# Patient Record
Sex: MALE
[De-identification: names, ages, dates, MRNs, and addresses within clinical notes are randomized per-mention and may not be internally consistent; named-entity substitution may affect disease eponyms.]

---

## 2018-03-16 ENCOUNTER — HOSPITAL ENCOUNTER (INPATIENT)
Dept: HOSPITAL 14 - H.ER | Age: 68
LOS: 5 days | Discharge: NURSING, REHAB OR LONG-TERM CARE | DRG: 871 | End: 2018-03-21
Attending: FAMILY MEDICINE | Admitting: FAMILY MEDICINE
Payer: MEDICARE

## 2018-03-16 VITALS — BODY MASS INDEX: 22.7 KG/M2

## 2018-03-16 DIAGNOSIS — E86.0: ICD-10-CM

## 2018-03-16 DIAGNOSIS — Z90.410: ICD-10-CM

## 2018-03-16 DIAGNOSIS — E83.39: ICD-10-CM

## 2018-03-16 DIAGNOSIS — L97.529: ICD-10-CM

## 2018-03-16 DIAGNOSIS — T68.XXXA: ICD-10-CM

## 2018-03-16 DIAGNOSIS — Z79.4: ICD-10-CM

## 2018-03-16 DIAGNOSIS — E89.1: ICD-10-CM

## 2018-03-16 DIAGNOSIS — M60.9: ICD-10-CM

## 2018-03-16 DIAGNOSIS — E87.6: ICD-10-CM

## 2018-03-16 DIAGNOSIS — L03.116: ICD-10-CM

## 2018-03-16 DIAGNOSIS — N17.9: ICD-10-CM

## 2018-03-16 DIAGNOSIS — Z23: ICD-10-CM

## 2018-03-16 DIAGNOSIS — E13.43: ICD-10-CM

## 2018-03-16 DIAGNOSIS — E87.0: ICD-10-CM

## 2018-03-16 DIAGNOSIS — E13.00: ICD-10-CM

## 2018-03-16 DIAGNOSIS — A41.9: Primary | ICD-10-CM

## 2018-03-16 DIAGNOSIS — R41.0: ICD-10-CM

## 2018-03-16 DIAGNOSIS — R31.9: ICD-10-CM

## 2018-03-16 DIAGNOSIS — E13.10: ICD-10-CM

## 2018-03-16 DIAGNOSIS — R65.20: ICD-10-CM

## 2018-03-16 DIAGNOSIS — E78.5: ICD-10-CM

## 2018-03-16 DIAGNOSIS — I10: ICD-10-CM

## 2018-03-16 DIAGNOSIS — E87.5: ICD-10-CM

## 2018-03-16 LAB
ALBUMIN SERPL-MCNC: 3.9 G/DL (ref 3.5–5)
ALBUMIN/GLOB SERPL: 0.8 {RATIO} (ref 1–2.1)
ALT SERPL-CCNC: 6 U/L (ref 21–72)
ANISOCYTOSIS BLD QL SMEAR: SLIGHT
APTT BLD: 32.5 SECONDS (ref 25.6–37.1)
AST SERPL-CCNC: 22 U/L (ref 17–59)
BACTERIA #/AREA URNS HPF: (no result) /[HPF]
BASE EXCESS BLDV CALC-SCNC: -20.3 MMOL/L (ref 0–2)
BASE EXCESS BLDV CALC-SCNC: -30.5 MMOL/L (ref 0–2)
BASOPHILS # BLD AUTO: 0.3 K/UL (ref 0–0.2)
BASOPHILS NFR BLD: 1 % (ref 0–2)
BILIRUB UR-MCNC: NEGATIVE MG/DL
BUN SERPL-MCNC: 51 MG/DL (ref 9–20)
BUN SERPL-MCNC: 54 MG/DL (ref 9–20)
BURR CELLS BLD QL SMEAR: SLIGHT
CALCIUM SERPL-MCNC: 10.7 MG/DL (ref 8.4–10.2)
CALCIUM SERPL-MCNC: 9.1 MG/DL (ref 8.4–10.2)
COLOR UR: YELLOW
EOSINOPHIL # BLD AUTO: 0 K/UL (ref 0–0.7)
EOSINOPHIL NFR BLD: 0.1 % (ref 0–4)
ERYTHROCYTE [DISTWIDTH] IN BLOOD BY AUTOMATED COUNT: 18.3 % (ref 11.5–14.5)
GFR NON-AFRICAN AMERICAN: 30
GFR NON-AFRICAN AMERICAN: 47
GIANT PLATELETS BLD QL SMEAR: PRESENT
GLUCOSE UR STRIP-MCNC: >=500 MG/DL
HGB BLD-MCNC: 12.7 G/DL (ref 12–18)
INR PPP: 1.2 (ref 0.9–1.2)
LEUKOCYTE ESTERASE UR-ACNC: (no result) LEU/UL
LG PLATELETS BLD QL SMEAR: PRESENT
LYMPHOCYTE: 7 % (ref 20–50)
LYMPHOCYTES # BLD AUTO: 1.3 K/UL (ref 1–4.3)
LYMPHOCYTES NFR BLD AUTO: 5.1 % (ref 20–40)
MCH RBC QN AUTO: 26.7 PG (ref 27–31)
MCHC RBC AUTO-ENTMCNC: 29.9 G/DL (ref 33–37)
MCV RBC AUTO: 89.1 FL (ref 80–94)
MONOCYTE: 6 % (ref 0–10)
MONOCYTES # BLD: 1.6 K/UL (ref 0–0.8)
MONOCYTES NFR BLD: 6.3 % (ref 0–10)
MYELOCYTES NFR BLD: 2 % (ref 0–0)
NEUTROPHILS # BLD: 22.4 K/UL (ref 1.8–7)
NEUTROPHILS NFR BLD AUTO: 80 % (ref 42–75)
NEUTROPHILS NFR BLD AUTO: 87.5 % (ref 50–75)
NEUTS BAND NFR BLD: 5 % (ref 0–2)
NRBC BLD AUTO-RTO: 0 % (ref 0–0)
OSMOLALITY SERPL: 365 MOSM/KG (ref 272–300)
PCO2 BLDV: 20 MMHG (ref 40–60)
PCO2 BLDV: 20 MMHG (ref 40–60)
PH BLDV: 6.81 [PH] (ref 7.32–7.43)
PH BLDV: 7.14 [PH] (ref 7.32–7.43)
PH UR STRIP: 5 [PH] (ref 5–8)
PLATELET # BLD EST: NORMAL 10*3/UL
PLATELET # BLD: 481 K/UL (ref 130–400)
PMV BLD AUTO: 12 FL (ref 7.2–11.7)
POIKILOCYTOSIS BLD QL SMEAR: SLIGHT
PROT UR STRIP-MCNC: 100 MG/DL
PROTHROMBIN TIME: 13.6 SECONDS (ref 9.8–13.1)
RBC # BLD AUTO: 4.78 MIL/UL (ref 4.4–5.9)
RBC # UR STRIP: (no result) /UL
SP GR UR STRIP: 1.02 (ref 1–1.03)
SQUAMOUS EPITHIAL: < 1 /HPF (ref 0–5)
T4 FREE SERPL-MCNC: 1.24 NG/DL (ref 0.78–2.19)
TOTAL CELLS COUNTED BLD: 100
URINE CLARITY: (no result)
URINE HYALINE CAST: (no result) /HPF (ref 0–2)
UROBILINOGEN UR-MCNC: (no result) MG/DL (ref 0.2–1)
VENOUS BLOOD FIO2: 21 %
VENOUS BLOOD FIO2: 21 %
VENOUS BLOOD GAS PO2: 43 MM/HG (ref 30–55)
VENOUS BLOOD GAS PO2: 44 MM/HG (ref 30–55)
WBC # BLD AUTO: 25.6 K/UL (ref 4.8–10.8)

## 2018-03-16 RX ADMIN — ENOXAPARIN SODIUM SCH MG: 30 INJECTION SUBCUTANEOUS at 20:16

## 2018-03-16 NOTE — CP.PCM.CON
History of Present Illness





- History of Present Illness


History of Present Illness: 





66 y/o M with PMhx of IDDM, s/p pancreatic resection many years ago is brought 

to ED with AMS after being found by brother at home laying in the floor naked. 

Brother states he was concerned that patient didnt return his calls since 

Yesterday. Last time a person spoke with him was around 10 am Yesterday. He 

states that patient has not been complaining of any medical issues recently 

other than a L/foot ulcer for what he had been applying some creams at home. As 

per brother patient always states he is compliant with his home insulin but 

they are not sure since patient lives alone. He is retired and dosnt work. 

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 At ED patient was found to 

have AMS, acidotic, BS extremely elevated, lactate and PH abnormal and patient 

was very hypotermic.





admitted for  DKA  sepsis  infected left foot 


ID consulted for antibiotic management 








Review of Systems





- Review of Systems


Systems not reviewed;Unavailable: Altered Mental Status


All systems: reviewed and no additional remarkable complaints except





- Constitutional


Constitutional: As Per HPI, Anorexia, Chills





- EENT


Eyes: absent: As Per HPI, Blind Spots, Blurred Vision, Change in Vision, 

Decreased Night Vision, Diplopia, Discharge, Dry Eye, Exophthalmos, Floaters, 

Irritation, Itchy Eyes, Loss of Peripheral Vision, Pain, Photophobia, Requires 

Corrective Lenses, Sees Flashes, Spots in Vision, Tunnel Vision, Other Visual 

Disturbances, Loss of Vision, Other


Ears: absent: As Per HPI, Decreased Hearing, Ear Discharge, Ear Pain, Tinnitus, 

Abnormal Hearing, Disequilibrium, Dizziness, Other


Nose/Mouth/Throat: absent: As Per HPI, Epistaxis, Nasal Congestion, Nasal 

Discharge, Nasal Obstruction, Nasal Trauma, Nose Pain, Post Nasal Drip, Sinus 

Pain, Sinus Pressure, Bleeding Gums, Change in Voice, Dental Pain, Dry Mouth, 

Dysphagia, Halitosis, Hoarsness, Lip Swelling, Mouth Lesions, Mouth Pain, 

Odynophagia, Sore Throat, Throat Swelling, Tongue Swelling, Facial Pain, Neck 

Pain, Neck Mass, Other





- Cardiovascular


Cardiovascular: As Per HPI





- Respiratory


Respiratory: absent: As Per HPI, Cough, Dyspnea, Hemoptysis, Dyspnea on Exertion

, Wheezing, Snoring, Stridor, Pain on Inspiration, Chest Congestion, Excessive 

Mucous Production, Change in Mucous Color, Pain with Coughing, Other





- Gastrointestinal


Gastrointestinal: absent: As Per HPI, Abdominal Pain, Belching, Bloating, 

Change in Bowel Habits, Change in Stool Character, Coffee Ground Emesis, 

Constipation, Cramping, Diarrhea, Dyspepsia, Dysphagia, Early Satiety, 

Excessive Flatus, Fecal Incontinence, Heartburn, Hematemesis, Hematochezia, 

Loose Stools, Melena, Nausea, Odynophagia, Temesmus, Vomiting, Other





- Genitourinary


Genitourinary: absent: As Per HPI, Change in Urinary Stream, Difficulty 

Urinating, Dysuria, Flank Pain, Hematuria, Pyuria, Nocturia, Urinary 

Incontinence, Urinary Frequency, Urinary Hesitance, Urinary Urgency, Voiding 

Freq/Small Amts, Freq UTI, Hx Renal/Bladder Calculi, Hx /Renal Surgery, 

Bladder Distension, Other





- Musculoskeletal


Musculoskeletal: As Per HPI





- Integumentary


Integumentary: As Per HPI, Skin Pain, Wounds





- Neurological


Neurological: As Per HPI





- Psychiatric


Psychiatric: absent: As Per HPI, Abnormal Sleep Pattern, Anhedonia, Anxiety, 

Auditory Hallucinations, Behavioral Changes, Change in Appetite, Change in 

Libido, Confusion, Depression, Difficulty Concentrating, Hallucinations, 

Homicidal Ideation, Hopelessness, Irritability, Memory Loss, Mood Swings, Panic 

Attacks, Paranoia, Suicidal Ideation, Visual Hallucinations, Tactile 

Hallucinations, Other





- Endocrine


Endocrine: As Per HPI





- Hematologic/Lymphatic


Hematologic: absent: As Per HPI, Easy Bleeding, Easy Bruising, Lymphadenopathy, 

Other





Past Patient History





- Past Social History


Smoking Status: Unknown If Ever Smoked


Alcohol: None (Ex ETOH abuser)





- NEUROLOGICAL


Hx Neurological Disorder: Yes





- RENAL


Hx Chronic Kidney Disease: No





- ENDOCRINE/METABOLIC


Hx Diabetes Mellitus Type 2: Yes





- GASTROINTESTINAL


Hx Pancreatitis: Yes





- PSYCHIATRIC


Hx Psychophysiologic Disorder: Yes


Hx Substance Use: No





- SURGICAL HISTORY


Hx Surgeries: Yes


Other/Comment: Pancreatic Sx





- ANESTHESIA


Hx Anesthesia: Yes


Hx Anesthesia Reactions: No





Meds


Allergies/Adverse Reactions: 


 Allergies











Allergy/AdvReac Type Severity Reaction Status Date / Time


 


No Known Allergies Allergy   Verified 03/16/18 09:59














- Medications


Medications: 


 Current Medications





Dextrose (Dextrose 50% Inj)  0 ml IV STAT PRN; Protocol


   PRN Reason: Hypoglycemia Protocol


Dextrose (Glutose 15)  0 gm PO ONCE PRN; Protocol


   PRN Reason: Hypoglycemia Protocol


Enoxaparin Sodium (Lovenox)  30 mg SC DAILY SUZY


   PRN Reason: Protocol


Glucagon (Glucagen Diagnostic Kit)  0 mg IM STAT PRN; Protocol


   PRN Reason: Hypoglycemia Protocol


Sodium Chloride (Sodium Chloride 0.9%)  1,000 mls @ 999 mls/hr IV .Q1H1M SUZY


   Stop: 03/17/18 10:04


   Last Admin: 03/16/18 12:29 Dose:  999 mls/hr


Insulin Human Regular 100 (units/ Sodium Chloride)  101 mls @ 7.07 mls/hr IV 

.[id number] SUZY; 7 UNITS/HR


   PRN Reason: Protocol


   Last Admin: 03/16/18 11:20 Dose:  7.07 mls/hr


Piperacillin Sod/Tazobactam (Sod 3.375 gm/ Sodium Chloride)  100 mls @ 100 mls/

hr IVPB Q6 SUZY


   PRN Reason: Protocol


Pantoprazole Sodium (Protonix Ec Tab)  40 mg PO DAILY [facility]











Physical Exam





- Constitutional


Appears: Toxic, Confused, Cachectic, Chronically Ill





- Head Exam


Head Exam: NORMOCEPHALIC





- Eye Exam


Eye Exam: absent: Scleral icterus





- ENT Exam


ENT Exam: Mucous Membranes Dry, Normal External Ear Exam





- Neck Exam


Neck exam: Negative for: Lymphadenopathy





- Respiratory Exam


Respiratory Exam: Decreased Breath Sounds, Rhonchi





- Cardiovascular Exam


Cardiovascular Exam: Tachycardia, REGULAR RHYTHM, +S1, +S2





- GI/Abdominal Exam


GI & Abdominal Exam: Diminished Bowel Sounds, Soft.  absent: Tenderness





- Rectal Exam


Rectal Exam: Deferred





-  Exam


 Exam: NORMAL INSPECTION





- Extremities Exam


Extremities exam: Positive for: pedal edema, tenderness, pedal pulses present.  

Negative for: calf tenderness


Additional comments: 





necrotic cellulitis lesion dorsum left foot 





- Back Exam


Back exam: absent: CVA tenderness (L), CVA tenderness (R)





- Neurological Exam


Neurological exam: Alert, CN II-XII Intact, Oriented x3, Reflexes Normal





- Psychiatric Exam


Psychiatric exam: Depressed





- Skin


Skin Exam: Dry





Results





- Vital Signs


Recent Vital Signs: 


 Last Vital Signs











Temp  92.4 F L  03/16/18 13:16


 


Pulse  91 H  03/16/18 13:41


 


Resp  20   03/16/18 13:41


 


BP  106/66   03/16/18 13:41


 


Pulse Ox  100   03/16/18 13:41














- Labs


Result Diagrams: 


 03/16/18 10:26





 03/16/18 10:26


Labs: 


 Laboratory Results - last 24 hr











  03/16/18 03/16/18 03/16/18





  10:20 10:26 10:26


 


WBC    25.6 H


 


RBC    4.78


 


Hgb    12.7


 


Hct    42.6


 


MCV    89.1


 


MCH    26.7 L


 


MCHC    29.9 L


 


RDW    18.3 H


 


Plt Count    481 H


 


MPV    12.0 H


 


Neut % (Auto)    87.5 H


 


Lymph % (Auto)    5.1 L


 


Mono % (Auto)    6.3


 


Eos % (Auto)    0.1


 


Baso % (Auto)    1.0


 


Neut # (Auto)    22.4 H


 


Lymph # (Auto)    1.3


 


Mono # (Auto)    1.6 H


 


Eos # (Auto)    0.0


 


Baso # (Auto)    0.3 H


 


Neutrophils % (Manual)    80 H


 


Band Neutrophils %    5 H


 


Lymphocytes % (Manual)    7 L


 


Monocytes % (Manual)    6


 


Myelocytes %    2 H


 


Platelet Estimate    Normal


 


Large Platelets    Present


 


Giant Platelets    Present


 


Poikilocytosis (manual    Slight


 


Anisocytosis (manual)    Slight


 


[place] Cells    Slight


 


PT   


 


INR   


 


APTT   


 


pO2  43  


 


VBG pH  6.81 L*  


 


VBG pCO2  20 L  


 


VBG HCO3  -0.6  


 


VBG Total CO2  3.8 L  


 


VBG O2 Sat (Calc)  74.6 H  


 


VBG Base Excess  -30.5 L  


 


VBG Potassium  6.3 H*  


 


Sodium  141.0  147 


 


Chloride  97.0 L  102 


 


Glucose  > 750 H*  


 


Lactate  4.6 H*  


 


FiO2  21.0  


 


Crit Value Called To  [facility]  


 


Crit Value Called By  23  


 


Crit Value Read Back  Y  


 


Blood Gas Notified Time  1040  


 


Potassium   5.7 H 


 


Carbon Dioxide   < 5 L* 


 


Anion Gap   46 H 


 


BUN   54 H 


 


Creatinine   2.2 H 


 


Est GFR ( Amer)   37 


 


Est GFR (Non-Af Amer)   30 


 


Random Glucose   961 H* 


 


Calcium   10.7 H 


 


Magnesium   2.9 H 


 


Total Bilirubin   0.4 


 


AST   22 


 


ALT   6 L 


 


Alkaline Phosphatase   137 H 


 


Total Protein   8.8 H 


 


Albumin   3.9 


 


Globulin   4.9 H 


 


Albumin/Globulin Ratio   0.8 L 


 


Venous Blood Potassium  6.3 H*  


 


Urine Color   


 


Urine Clarity   


 


Urine pH   


 


Ur Specific Gravity   


 


Urine Protein   


 


Urine Glucose (UA)   


 


Urine Ketones   


 


Urine Blood   


 


Urine Nitrate   


 


Urine Bilirubin   


 


Urine Urobilinogen   


 


Ur Leukocyte Esterase   


 


Urine RBC (Auto)   


 


Urine Microscopic WBC   


 


Ur Squamous Epith Cells   


 


Urine Bacteria   


 


Hyaline Casts   














  03/16/18 03/16/18





  10:26 12:11


 


WBC  


 


RBC  


 


Hgb  


 


Hct  


 


MCV  


 


MCH  


 


MCHC  


 


RDW  


 


Plt Count  


 


MPV  


 


Neut % (Auto)  


 


Lymph % (Auto)  


 


Mono % (Auto)  


 


Eos % (Auto)  


 


Baso % (Auto)  


 


Neut # (Auto)  


 


Lymph # (Auto)  


 


Mono # (Auto)  


 


Eos # (Auto)  


 


Baso # (Auto)  


 


Neutrophils % (Manual)  


 


Band Neutrophils %  


 


Lymphocytes % (Manual)  


 


Monocytes % (Manual)  


 


Myelocytes %  


 


Platelet Estimate  


 


Large Platelets  


 


Giant Platelets  


 


Poikilocytosis (manual  


 


Anisocytosis (manual)  


 


[place] Cells  


 


PT  13.6 H 


 


INR  1.2 


 


APTT  32.5 


 


pO2  


 


VBG pH  


 


VBG pCO2  


 


VBG HCO3  


 


VBG Total CO2  


 


VBG O2 Sat (Calc)  


 


VBG Base Excess  


 


VBG Potassium  


 


Sodium  


 


Chloride  


 


Glucose  


 


Lactate  


 


FiO2  


 


Crit Value Called To  


 


Crit Value Called By  


 


Crit Value Read Back  


 


Blood Gas Notified Time  


 


Potassium  


 


Carbon Dioxide  


 


Anion Gap  


 


BUN  


 


Creatinine  


 


Est GFR ( Amer)  


 


Est GFR (Non-Af Amer)  


 


Random Glucose  


 


Calcium  


 


Magnesium  


 


Total Bilirubin  


 


AST  


 


ALT  


 


Alkaline Phosphatase  


 


Total Protein  


 


Albumin  


 


Globulin  


 


Albumin/Globulin Ratio  


 


Venous Blood Potassium  


 


Urine Color   Yellow


 


Urine Clarity   Slighty-cloudy


 


Urine pH   5.0


 


Ur Specific Gravity   1.016


 


Urine Protein   100


 


Urine Glucose (UA)   >=500


 


Urine Ketones   80


 


Urine Blood   Moderate


 


Urine Nitrate   Negative


 


Urine Bilirubin   Negative


 


Urine Urobilinogen   0.2-1.0


 


Ur Leukocyte Esterase   Neg


 


Urine RBC (Auto)   6 H


 


Urine Microscopic WBC   1


 


Ur Squamous Epith Cells   < 1


 


Urine Bacteria   Rare


 


Hyaline Casts   6-10 H














Assessment & Plan





- Assessment and Plan (Free Text)


Assessment: 





sepsis


hypothermia


dka


dm


cellulitis / necrosis left foot 








cont iv rx


vascular and podiatry mae


iv fluids/ insulin


iv antibiotics

## 2018-03-16 NOTE — RAD
HISTORY:

AMS  



COMPARISON:

Not available 



FINDINGS:



LUNGS:

No active pulmonary disease.



PLEURA:

No significant pleural effusion identified, no pneumothorax apparent.



CARDIOVASCULAR:

Normal.



OSSEOUS STRUCTURES:

No significant abnormalities.



VISUALIZED UPPER ABDOMEN:

Normal.



OTHER FINDINGS:

None.



IMPRESSION:

No active disease.

## 2018-03-16 NOTE — CP.PCM.HP
<PhuAndre - Last Filed: 03/16/18 13:51>





History of Present Illness





- History of Present Illness


History of Present Illness: 





Hx taken from family member since patient has AMS





66 y/o M with PMhx of IDDM, s/p pancreatic resection many years ago is brought 

to ED with AMS after being found by brother at home laying in the floor naked. 

Brother states he was concerned that patient didnt return his calls since 

Yesterday. Last time a person spoke with him was around 10 am Yesterday. He 

states that patient has not been complaining of any medical issues recently 

other than a L/foot ulcer for what he had been applying some creams at home. As 

per brother patient always states he is compliant with his home insulin but 

they are not sure since patient lives alone. He is retired and dosnt work. 

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 At ED patient was found to 

have AMS, acidotic, BS extremely elevated, lactate and PH abnormal and patient 

was very hypotermic.





Present on Admission





- Present on Admission


Any Indicators Present on Admission: Yes





Review of Systems





- Review of Systems


Review of Systems: 





Unable to obtain due to patients AMS





Past Patient History





- Past Social History


Smoking Status: Unknown If Ever Smoked


Alcohol: None (Ex ETOH abuser)





- NEUROLOGICAL


Hx Neurological Disorder: Yes





- RENAL


Hx Chronic Kidney Disease: No





- ENDOCRINE/METABOLIC


Hx Diabetes Mellitus Type 2: Yes





- PSYCHIATRIC


Hx Psychophysiologic Disorder: Yes


Hx Substance Use: No





- SURGICAL HISTORY


Hx Surgeries: Yes


Other/Comment: Pancreatic Sx





Meds


Allergies/Adverse Reactions: 


 Allergies











Allergy/AdvReac Type Severity Reaction Status Date / Time


 


No Known Allergies Allergy   Verified 03/16/18 09:59














Physical Exam





- Constitutional


Appears: In Acute Distress (AMS)





- Eye Exam


Eye Exam: PERRL





- ENT Exam


ENT Exam: Mucous Membranes Dry





- Respiratory Exam


Respiratory Exam: absent: Rales, Wheezes, Respiratory Distress





- Cardiovascular Exam


Cardiovascular Exam: REGULAR RHYTHM, +S1, +S2.  absent: Gallop





- GI/Abdominal Exam


GI & Abdominal Exam: Soft.  absent: Distended, Guarding, Rigid


Additional comments: 





Sx scars





- Extremities Exam


Extremities exam: Negative for: normal inspection (L/ankle ulcer), pedal edema





- Neurological Exam


Neurological exam: Alert, Altered (Diffucult to arouse, response to verbal 

command is poor)





- Skin


Additional comments: 


Multiple small abrasion/scratching lesions in both LE





Results





- Vital Signs


Recent Vital Signs: 





 Last Vital Signs











Temp  91.3 F L  03/16/18 11:19


 


Pulse  90   03/16/18 10:40


 


Resp  24   03/16/18 10:40


 


BP  120/88   03/16/18 10:40


 


Pulse Ox  100   03/16/18 10:40














- Labs


Result Diagrams: 


 03/16/18 10:26





 03/16/18 10:26


Labs: 





 Laboratory Results - last 24 hr











  03/16/18 03/16/18 03/16/18





  10:20 10:26 10:26


 


WBC    25.6 H


 


RBC    4.78


 


Hgb    12.7


 


Hct    42.6


 


MCV    89.1


 


MCH    26.7 L


 


MCHC    29.9 L


 


RDW    18.3 H


 


Plt Count    481 H


 


MPV    12.0 H


 


Neut % (Auto)    87.5 H


 


Lymph % (Auto)    5.1 L


 


Mono % (Auto)    6.3


 


Eos % (Auto)    0.1


 


Baso % (Auto)    1.0


 


Neut # (Auto)    22.4 H


 


Lymph # (Auto)    1.3


 


Mono # (Auto)    1.6 H


 


Eos # (Auto)    0.0


 


Baso # (Auto)    0.3 H


 


Neutrophils % (Manual)    80 H


 


Band Neutrophils %    5 H


 


Lymphocytes % (Manual)    7 L


 


Monocytes % (Manual)    6


 


Myelocytes %    2 H


 


Platelet Estimate    Normal


 


Large Platelets    Present


 


Giant Platelets    Present


 


Poikilocytosis (manual    Slight


 


Anisocytosis (manual)    Slight


 


[place] Cells    Slight


 


PT   


 


INR   


 


APTT   


 


pO2  43  


 


VBG pH  6.81 L*  


 


VBG pCO2  20 L  


 


VBG HCO3  -0.6  


 


VBG Total CO2  3.8 L  


 


VBG O2 Sat (Calc)  74.6 H  


 


VBG Base Excess  -30.5 L  


 


VBG Potassium  6.3 H*  


 


Sodium  141.0  147 


 


Chloride  97.0 L  102 


 


Glucose  > 750 H*  


 


Lactate  4.6 H*  


 


FiO2  21.0  


 


Crit Value Called To  [facility]  


 


Crit Value Called By  23  


 


Crit Value Read Back  Y  


 


Blood Gas Notified Time  1040  


 


Potassium   5.7 H 


 


Carbon Dioxide   < 5 L* 


 


Anion Gap   46 H 


 


BUN   54 H 


 


Creatinine   2.2 H 


 


Est GFR ( Amer)   37 


 


Est GFR (Non-Af Amer)   30 


 


Random Glucose   961 H* 


 


Calcium   10.7 H 


 


Magnesium   2.9 H 


 


Total Bilirubin   0.4 


 


AST   22 


 


ALT   6 L 


 


Alkaline Phosphatase   137 H 


 


Total Protein   8.8 H 


 


Albumin   3.9 


 


Globulin   4.9 H 


 


Albumin/Globulin Ratio   0.8 L 


 


Venous Blood Potassium  6.3 H*  














  03/16/18





  10:26


 


WBC 


 


RBC 


 


Hgb 


 


Hct 


 


MCV 


 


MCH 


 


MCHC 


 


RDW 


 


Plt Count 


 


MPV 


 


Neut % (Auto) 


 


Lymph % (Auto) 


 


Mono % (Auto) 


 


Eos % (Auto) 


 


Baso % (Auto) 


 


Neut # (Auto) 


 


Lymph # (Auto) 


 


Mono # (Auto) 


 


Eos # (Auto) 


 


Baso # (Auto) 


 


Neutrophils % (Manual) 


 


Band Neutrophils % 


 


Lymphocytes % (Manual) 


 


Monocytes % (Manual) 


 


Myelocytes % 


 


Platelet Estimate 


 


Large Platelets 


 


Giant Platelets 


 


Poikilocytosis (manual 


 


Anisocytosis (manual) 


 


[place] Cells 


 


PT  13.6 H


 


INR  1.2


 


APTT  32.5


 


pO2 


 


VBG pH 


 


VBG pCO2 


 


VBG HCO3 


 


VBG Total CO2 


 


VBG O2 Sat (Calc) 


 


VBG Base Excess 


 


VBG Potassium 


 


Sodium 


 


Chloride 


 


Glucose 


 


Lactate 


 


FiO2 


 


Crit Value Called To 


 


Crit Value Called By 


 


Crit Value Read Back 


 


Blood Gas Notified Time 


 


Potassium 


 


Carbon Dioxide 


 


Anion Gap 


 


BUN 


 


Creatinine 


 


Est GFR ( Amer) 


 


Est GFR (Non-Af Amer) 


 


Random Glucose 


 


Calcium 


 


Magnesium 


 


Total Bilirubin 


 


AST 


 


ALT 


 


Alkaline Phosphatase 


 


Total Protein 


 


Albumin 


 


Globulin 


 


Albumin/Globulin Ratio 


 


Venous Blood Potassium 














Assessment & Plan





- Assessment and Plan (Free Text)


Assessment: 





66 y/o M with hx of IDDM admitted for metabolic acidosis, DKA, poss Sepsis





AMS: Likely due to DKA/Acidosis





DKA/Metabolic acidosis


PH low, hyperglicemic: C/w insulin drip as per protocol and NPO for now


Dehydration/Hypotermia: C/W Aggressive fluid resuscitation





Suspected sepsis


AMS, hypotermia, Lactate and WBC elevated + Chronic LE ulceration in DM patient


C/W IV Abx and aggressive hydration


ID consult





Hypotermia


Sepsis vs Exposure(Found naked) vs dehydration or combination


C/W Hypotermia treatment





L/foot ulcer


Podiatry consult





Hyperkalemia


Likely due to DKA


C/W Hydration/Insulin


Monitor





ERIC


Poss due to dehydration/DKA


IV fluids


Monitor





<Gunner Valdez - Last Filed: 03/20/18 15:04>





Results





- Vital Signs


Recent Vital Signs: 





 Last Vital Signs











Temp  100.6 F H  03/20/18 07:59


 


Pulse  63   03/20/18 13:01


 


Resp  20   03/20/18 07:59


 


BP  123/69   03/20/18 07:59


 


Pulse Ox  98   03/20/18 07:59














- Labs


Result Diagrams: 


 03/20/18 05:55





 03/20/18 05:55


Labs: 





 Laboratory Results - last 24 hr











  03/19/18 03/19/18 03/20/18





  17:38 21:27 05:41


 


WBC   


 


RBC   


 


Hgb   


 


Hct   


 


MCV   


 


MCH   


 


MCHC   


 


RDW   


 


Plt Count   


 


Sodium   


 


Potassium   


 


Chloride   


 


Carbon Dioxide   


 


Anion Gap   


 


BUN   


 


Creatinine   


 


Est GFR ( Amer)   


 


Est GFR (Non-Af Amer)   


 


POC Glucose (mg/dL)  80  158 H  126 H


 


Random Glucose   


 


Uric Acid   


 


Calcium   


 


Total Bilirubin   


 


AST   


 


ALT   


 


Alkaline Phosphatase   


 


Total Protein   


 


Albumin   


 


Globulin   


 


Albumin/Globulin Ratio   














  03/20/18 03/20/18 03/20/18





  05:55 05:55 11:16


 


WBC  16.9 H  


 


RBC  3.71 L  


 


Hgb  10.1 L  


 


Hct  29.8 L  


 


MCV  80.1  


 


MCH  27.1  


 


MCHC  33.8  


 


RDW  16.2 H  


 


Plt Count  289  


 


Sodium   144 


 


Potassium   3.3 L 


 


Chloride   109 H 


 


Carbon Dioxide   26 


 


Anion Gap   12 


 


BUN   14 


 


Creatinine   0.7 L 


 


Est GFR ( Amer)   > 60 


 


Est GFR (Non-Af Amer)   > 60 


 


POC Glucose (mg/dL)    134 H


 


Random Glucose   113 H 


 


Uric Acid   1.3 L 


 


Calcium   7.9 L 


 


Total Bilirubin   0.3 


 


AST   15 L D 


 


ALT   25 


 


Alkaline Phosphatase   56 


 


Total Protein   6.1 L 


 


Albumin   2.5 L 


 


Globulin   3.6 


 


Albumin/Globulin Ratio   0.7 L 














Assessment & Plan





- Assessment and Plan (Free Text)


Plan: 





Saw patient with resident. Plan reviewed, agree with above note

## 2018-03-16 NOTE — RAD
PROCEDURE:  Left Foot Radiographs.



HISTORY:

L foot wound possible sepsis  



COMPARISON:

None.



FINDINGS:



BONES:

Normal. No fracture. 



JOINTS:

Normal. 



SOFT TISSUES:

Vascular calcification 



OTHER FINDINGS:

None.



IMPRESSION:

No plain radiographic evidence of osteomyelitis.

## 2018-03-16 NOTE — CARD
--------------- APPROVED REPORT --------------





EKG Measurement

Heart [id number]

MI [id number]

[id number]

[id number]

[id number]



<Conclusion>

Sinus rhythm with frequent premature ventricular complexes

Left axis deviation

Moderate voltage criteria for LVH, may be normal variant

Prolonged QT

Abnormal ECG

## 2018-03-16 NOTE — CP.PCM.CON
History of Present Illness





- History of Present Illness


History of Present Illness: 





Podiatry Consult Note - Dr. Ascencio





Hx taken from chart - patient has AMS





67 year old male patient PMHx IDDM, s/p pancreatic resection  was brought to ED 

after he was found at home, naked, unresponsive, (+)AMS. Patient was found to 

have glucose >500 (961 @ 10:26). Podiatry consulted for left foot wound as 

possible source of sepsis. Per chart, patient's brother denies any recent 

medial issues other than left foot wound; was treating wound at home with 

unknown creams. 





Review of Systems





- Review of Systems


Systems not reviewed;Unavailable: Altered Mental Status





Past Patient History





- Past Social History


Smoking Status: Unknown If Ever Smoked





- ENDOCRINE/METABOLIC


Hx Diabetes Mellitus Type 2: Yes





- GASTROINTESTINAL


Hx Pancreatitis: Yes





- PSYCHIATRIC


Hx Substance Use: No





- SURGICAL HISTORY


Other/Comment: removal of pancreas





- ANESTHESIA


Hx Anesthesia: Yes


Hx Anesthesia Reactions: No





Meds


Allergies/Adverse Reactions: 


 Allergies











Allergy/AdvReac Type Severity Reaction Status Date / Time


 


No Known Allergies Allergy   Verified 03/16/18 09:59














- Medications


Medications: 


 Current Medications





Dextrose (Dextrose 50% Inj)  0 ml IV STAT PRN; Protocol


   PRN Reason: Hypoglycemia Protocol


Dextrose (Glutose 15)  0 gm PO ONCE PRN; Protocol


   PRN Reason: Hypoglycemia Protocol


Glucagon (Glucagen Diagnostic Kit)  0 mg IM STAT PRN; Protocol


   PRN Reason: Hypoglycemia Protocol


Sodium Chloride (Sodium Chloride 0.9%)  1,000 mls @ 999 mls/hr IV .Q1H1M SUZY


   Stop: 03/17/18 10:04


   Last Admin: 03/16/18 12:29 Dose:  999 mls/hr


Insulin Human Regular 100 (units/ Sodium Chloride)  101 mls @ 7.07 mls/hr IV 

.[id number] SUZY; 7 UNITS/HR


   PRN Reason: Protocol


   Last Admin: 03/16/18 11:20 Dose:  7.07 mls/hr











Physical Exam





- Constitutional


Appears: Toxic





- Extremities Exam


Additional comments: 





VASC: LLE DP and PT pulses nonpalpable. RLE DP pulse weakly palpalbe 1/4, PT 

nonpalpable. CFT <3 seconds to all digits x10. Mild nonpitting edmea noted to 

left foot.





NEURO: Unable to assess due to AMS





DERM: Necrotic eschar noted to dorsolateral left foot with periwound skin 

erosion - eschar is hard/firm except for area on lateral foot which is boggy to 

palpation; periwound erythem noted.





ORTHO: Unable to assess pain. LLE ankle joint ROM limited





- Neurological Exam


Neurological exam: Altered





Results





- Vital Signs


Recent Vital Signs: 


 Last Vital Signs











Temp  91.2 F L  03/16/18 11:45


 


Pulse  88   03/16/18 12:50


 


Resp  24   03/16/18 12:50


 


BP  104/78   03/16/18 12:50


 


Pulse Ox  100   03/16/18 12:50














- Labs


Result Diagrams: 


 03/16/18 10:26





 03/16/18 10:26


Labs: 


 Laboratory Results - last 24 hr











  03/16/18 03/16/18 03/16/18





  10:20 10:26 10:26


 


WBC    25.6 H


 


RBC    4.78


 


Hgb    12.7


 


Hct    42.6


 


MCV    89.1


 


MCH    26.7 L


 


MCHC    29.9 L


 


RDW    18.3 H


 


Plt Count    481 H


 


MPV    12.0 H


 


Neut % (Auto)    87.5 H


 


Lymph % (Auto)    5.1 L


 


Mono % (Auto)    6.3


 


Eos % (Auto)    0.1


 


Baso % (Auto)    1.0


 


Neut # (Auto)    22.4 H


 


Lymph # (Auto)    1.3


 


Mono # (Auto)    1.6 H


 


Eos # (Auto)    0.0


 


Baso # (Auto)    0.3 H


 


Neutrophils % (Manual)    80 H


 


Band Neutrophils %    5 H


 


Lymphocytes % (Manual)    7 L


 


Monocytes % (Manual)    6


 


Myelocytes %    2 H


 


Platelet Estimate    Normal


 


Large Platelets    Present


 


Giant Platelets    Present


 


Poikilocytosis (manual    Slight


 


Anisocytosis (manual)    Slight


 


Claude Cells    Slight


 


PT   


 


INR   


 


APTT   


 


pO2  43  


 


VBG pH  6.81 L*  


 


VBG pCO2  20 L  


 


VBG HCO3  -0.6  


 


VBG Total CO2  3.8 L  


 


VBG O2 Sat (Calc)  74.6 H  


 


VBG Base Excess  -30.5 L  


 


VBG Potassium  6.3 H*  


 


Sodium  141.0  147 


 


Chloride  97.0 L  102 


 


Glucose  > 750 H*  


 


Lactate  4.6 H*  


 


FiO2  21.0  


 


Crit Value Called To  [facility]  


 


Crit Value Called By  23  


 


Crit Value Read Back  Y  


 


Blood Gas Notified Time  1040  


 


Potassium   5.7 H 


 


Carbon Dioxide   < 5 L* 


 


Anion Gap   46 H 


 


BUN   54 H 


 


Creatinine   2.2 H 


 


Est GFR ( Amer)   37 


 


Est GFR (Non-Af Amer)   30 


 


Random Glucose   961 H* 


 


Calcium   10.7 H 


 


Magnesium   2.9 H 


 


Total Bilirubin   0.4 


 


AST   22 


 


ALT   6 L 


 


Alkaline Phosphatase   137 H 


 


Total Protein   8.8 H 


 


Albumin   3.9 


 


Globulin   4.9 H 


 


Albumin/Globulin Ratio   0.8 L 


 


Venous Blood Potassium  6.3 H*  


 


Urine Color   


 


Urine Clarity   


 


Urine pH   


 


Ur Specific Gravity   


 


Urine Protein   


 


Urine Glucose (UA)   


 


Urine Ketones   


 


Urine Blood   


 


Urine Nitrate   


 


Urine Bilirubin   


 


Urine Urobilinogen   


 


Ur Leukocyte Esterase   


 


Urine RBC (Auto)   


 


Urine Microscopic WBC   


 


Ur Squamous Epith Cells   


 


Urine Bacteria   


 


Hyaline Casts   














  03/16/18 03/16/18





  10:26 12:11


 


WBC  


 


RBC  


 


Hgb  


 


Hct  


 


MCV  


 


MCH  


 


MCHC  


 


RDW  


 


Plt Count  


 


MPV  


 


Neut % (Auto)  


 


Lymph % (Auto)  


 


Mono % (Auto)  


 


Eos % (Auto)  


 


Baso % (Auto)  


 


Neut # (Auto)  


 


Lymph # (Auto)  


 


Mono # (Auto)  


 


Eos # (Auto)  


 


Baso # (Auto)  


 


Neutrophils % (Manual)  


 


Band Neutrophils %  


 


Lymphocytes % (Manual)  


 


Monocytes % (Manual)  


 


Myelocytes %  


 


Platelet Estimate  


 


Large Platelets  


 


Giant Platelets  


 


Poikilocytosis (manual  


 


Anisocytosis (manual)  


 


Claude Cells  


 


PT  13.6 H 


 


INR  1.2 


 


APTT  32.5 


 


pO2  


 


VBG pH  


 


VBG pCO2  


 


VBG HCO3  


 


VBG Total CO2  


 


VBG O2 Sat (Calc)  


 


VBG Base Excess  


 


VBG Potassium  


 


Sodium  


 


Chloride  


 


Glucose  


 


Lactate  


 


FiO2  


 


Crit Value Called To  


 


Crit Value Called By  


 


Crit Value Read Back  


 


Blood Gas Notified Time  


 


Potassium  


 


Carbon Dioxide  


 


Anion Gap  


 


BUN  


 


Creatinine  


 


Est GFR ( Amer)  


 


Est GFR (Non-Af Amer)  


 


Random Glucose  


 


Calcium  


 


Magnesium  


 


Total Bilirubin  


 


AST  


 


ALT  


 


Alkaline Phosphatase  


 


Total Protein  


 


Albumin  


 


Globulin  


 


Albumin/Globulin Ratio  


 


Venous Blood Potassium  


 


Urine Color   Yellow


 


Urine Clarity   Slighty-cloudy


 


Urine pH   5.0


 


Ur Specific Gravity   1.016


 


Urine Protein   100


 


Urine Glucose (UA)   >=500


 


Urine Ketones   80


 


Urine Blood   Moderate


 


Urine Nitrate   Negative


 


Urine Bilirubin   Negative


 


Urine Urobilinogen   0.2-1.0


 


Ur Leukocyte Esterase   Neg


 


Urine RBC (Auto)   6 H


 


Urine Microscopic WBC   1


 


Ur Squamous Epith Cells   < 1


 


Urine Bacteria   Rare


 


Hyaline Casts   6-10 H














Assessment & Plan





- Assessment and Plan (Free Text)


Assessment: 





67M with unstageable necrotic eschar to dorsum of left foot, r/o source sepsis


Plan: 





Patient seen and evaluated


Discussed with attending, Dr. Ascencio


Hypothermic T91.3, WBC 25.6, fingerstick BS >500, random glucose 961


L foot XR ordered


-Consider LLE MRI


L foot dressed with DSD


Continue abx per ID - Zosyn 3.375g IV


Patient to be admitted to ICU


Podiatry will continue to follow patient while in house

## 2018-03-16 NOTE — ED PDOC
HPI: Altered Mental Status


Time Seen by Provider: 03/16/18 10:00


Chief Complaint (Nursing): Altered Mental Status


Chief Complaint (Provider): AMS


History Per: EMS


History/Exam Limitations: Clinical Condition


Onset/Duration Of Symptoms: Unknown


Onset Of Symptoms: Cannot Confirm Onset


Description Of Symptoms: Not At Baseline


Usual Baseline: Unknown


Exacerbating Factor(s): Unknown


Use Of Anticoag/Antiplatlets: Unknown


Additional Complaint(s): 





68yo male arrives via EMS, per report brother found him altered in his 

apartment. Unknown specific history and patient is currently nonverbal. 


Accucheck >500 with Kussmaul respirations on arrival. History limited due to 

clinical condition.





Past Medical History


Reviewed: Historical Data, Nursing Documentation, Vital Signs, Unable To Obtain


Vital Signs: 


 Last Vital Signs











Temp  98.1 F   03/16/18 09:59


 


Pulse  81   03/16/18 09:59


 


Resp  20   03/16/18 09:59


 


BP  101/55 L  03/16/18 09:59


 


Pulse Ox      














- Medical History


PMH: Diabetes





- Surgical History


Other surgeries: unknown





- Family History


Family History: States: Unknown Family Hx





- Living Arrangements


Living Arrangements: With Family





- Social History


Current smoker - smoking cessation education provided: No





- Immunization History


Hx Tetanus Toxoid Vaccination: No


Hx Influenza Vaccination: No


Hx Pneumococcal Vaccination: No





- Home Medications


Home Medications: 


 Ambulatory Orders











 Medication  Instructions  Recorded


 


Benztropine [Cogentin] 1 mg PO Q12 03/16/18


 


Haloperidol [Haldol] 5 mg PO Q8 03/16/18


 


Insulin Human Isophane (NPH) 12 unit SC BID 03/16/18





[Novolin N]  


 


Temazepam [Restoril] 30 mg PO HS PRN 03/16/18


 


Gabapentin [Neurontin] 600 mg PO BID  tab 03/21/18


 


Insulin Detemir [Levemir] 40 units SC HS  vial 03/21/18


 


Mupirocin 2% Ointment [Bactroban 1 applic TOP BID  tube 03/21/18





Ointment]  


 


Pantoprazole [Protonix] 40 mg PO DAILY #30 ect 03/21/18


 


Pioglitazone [Actos] 30 mg PO DAILY  tab 03/21/18


 


Piperacill/Tazo 3.375gm in Dex 3.375 gm IVPB Q8 #15 bag 03/21/18





[Zosyn 3.375 Gm IV]  


 


traMADol [Ultram] 50 mg PO Q6 PRN #20 tab 03/21/18














- Allergies


Allergies/Adverse Reactions: 


 Allergies











Allergy/AdvReac Type Severity Reaction Status Date / Time


 


No Known Allergies Allergy   Verified 03/16/18 09:59














Review of Systems


Review Of Systems: ROS cannot be obtained secondary to pt's inabilty to answer 

questions.





Physical Exam





- Reviewed


Nursing Documentation Reviewed: Yes


Vital Signs Reviewed: Yes





- Physical Exam


Appears: Positive for: In Acute Distress (ill appearing, kussmaul respirations)


Head Exam: Positive for: ATRAUMATIC, NORMAL INSPECTION, NORMOCEPHALIC


Skin: Positive for: Warm, Pallor


Eye Exam: Positive for: EOMI, Normal appearance, PERRL


ENT: Positive for: Normal ENT Inspection


Neck: Positive for: Normal, Painless ROM


Cardiovascular/Chest: Positive for: Tachycardia


Respiratory: Positive for: Other (tachypnea).  Negative for: Rales, Rhonchi, 

Stridor


Pulses-Radial (L): 2+


Pulses-Radial (R): 2+


Gastrointestinal/Abdominal: Positive for: Bowel Sounds, Soft, Other (large scar 

midabdomen).  Negative for: Tenderness


Back: Positive for: Normal Inspection


Extremity: Positive for: Normal ROM, Other (L foot w wound/?burn to dorsum, 

eschar)


Neurologic/Psych: Positive for: Other (nonverbal, does not follow commands, 

diminished tone bilaterally)





- Laboratory Results


Result Diagrams: 


 03/21/18 06:10





 03/21/18 06:10





- ECG


ECG: Positive for: Interpreted By Me


ECG Rhythm: Positive for: Sinus Tachycardia, Nonspecific Changes


Interpretation Of ECG: 








Rate: 101





- Radiology


X-Ray: Read By Radiologist


X-Ray Interpretation: No Acute Disease





- Critical Care


Total Time (In Min): 70


Comments: 





pt required immediate and recurrent bedside attention for life threatening 

metabolic emergency





Medical Decision Making


Medical Decision Making: 





suspicious for DKA on clinical presentation.


bilateral IV access obtained, IVF 2L wide open initiated


chemistries confirmed DKA, insulin drip initiated





cultures obtained


ida glaser and warmed IVF initiated for hypothermia





unclear etiology of DKA- noncompliance w insulin vs inciting factor.


Elevated lactate explained by DKA. 


Pt covered for sepsis, possible source LLE wound, podiatry consulted.





CT brain obtained given found on floor





d/q ICU Dr Prado 1120am


Admit Dr Valdez who was in ED approx 1130a














Disposition





- Clinical Impression


Clinical Impression: 


 DKA (diabetic ketoacidoses), Sepsis








- Patient ED Disposition


Is Patient to be Admitted: Yes


Counseled Patient/Family Regarding: Studies Performed, Diagnosis





- Disposition


Disposition Time: 12:30


Condition: STABLE





- Pt Status Changed To:


Hospital Disposition Of: Inpatient





- Admit Certification


Admit to Inpatient:: After my assessment, the patient will require 

hospitalization for at least two midnights.  This is because of the severity of 

symptoms shown, intensity of services needed, and/or the medical risk in this 

patient being treated as an outpatient.





- POA


Present On Arrival: Poor Glycemic Control

## 2018-03-16 NOTE — CT
PROCEDURE:  CT HEAD WITHOUT CONTRAST.



HISTORY:

AMS



COMPARISON:

None available. 



TECHNIQUE:

Axial computed tomography images were obtained through the head/brain 

without intravenous contrast.  



Radiation dose:



Total exam DLP = 924.83 mGy-cm.



This CT exam was performed using one or more of the following dose 

reduction techniques: Automated exposure control, adjustment of the 

mA and/or kV according to patient size, and/or use of iterative 

reconstruction technique.



FINDINGS:



HEMORRHAGE:

No intracranial hemorrhage. 



BRAIN:

Good corticomedullary differentiation is seen.  Diffuse expansion of 

the ventriculosulcal and cisternal spaces is appreciated with trace 

white matter lucency compatible with diffuse cerebral atrophy and 

limited chronic microangiopathy.  No suspicious extra-axial fluid 

collection is identified and the midline brain anatomy appears 

grossly nonfocal as imaged. There is no mass effect throughout..



VENTRICLES:

Unremarkable. No hydrocephalus. 



CALVARIUM:

Unremarkable.



PARANASAL SINUSES:

Unremarkable as visualized. No significant inflammatory changes.



MASTOID AIR CELLS:

Unremarkable as visualized. No inflammatory changes.



OTHER FINDINGS:

None.



IMPRESSION:

Limited age-related neuro degenerative changes are identified without 

intracranial hemorrhage, mass effect or cortical edema.  Consider 

follow-up CT or MRI if clinically warranted given clinical history.

## 2018-03-16 NOTE — CP.CCUPN
CCU Subjective





- Physician Review


Subjective (Free Text): 


67M admitted with AMS, found by brother to be unresponsive, hypothermia with T= 

91.3F, and elevated BS 500s on fingerstick BS. Initial labs showed AG MAC with 

, and Lactate 4.6. He is not hypotensive nor hypoxemic. CT Brain was 

negative for acute pathology and exam survey for infection so far has been 

negative except for left foot ulcer. Given fluid resuscitation with 3 liters 

warmed saline so far; and started on insulin drip. Pancultured and started on 

Vanco / Zosyn. 





Other Vitals and I/Os reviewed. 





ROS:  No other pertinent negs or positives on 10+  system review, intubated. 





Allergies: NKDA





Home Meds:  Cogentin, Haldol, Restoril, NPH insulin





PMSFH:     Pancreas surgery, Psych Disorder, no Tobacco uses, + ETOH abuse in 

the past. All other historical Nursing and physician documentation reviewed to 

date; no new pertinent info noted relevant to current medical problems.





EXAM-


HEENT: normocephalic, no icterus, no gaze preference, pupils equal and reactive

, no icterus.


NECK: No JVD, supple, carotids equal upstroke bilat/no bruits


CHEST: decreased BS bases, otherwise clear bilat, no wheezes audible


HEART:  regular distant, S1S2, no rubs.


ABD:  soft, no distention, no tympany, no palp tenderness, BS not audible.


EXT:  + edema.   No peripheral/ digital cyanosis, no calf tenderness or 

palpable cords, distal pulses intact and symmetrical. Necrotic appearing ulcer 

dorsal left foot area. 


NEURO:  no gross focal motor deficits 


SKIN:   no rashes, warm and dry. 





LABS: 


WBC= 25.6


HGB= 12.7


PLTs=  481K 





Na= 147


K= 5.7


CL=  102


HCO3= 5


BUN/Cr=  54/2.2 


BS=  961





CT Brain negative for acute disease as seen in official report.


CXR (my interp):  clear


EKG (my interp):  sinus 100/min, LAD, tall Ts in V3-6.











MAJOR PROBLEMS:


1.   AMS 2 Hyperosmolar Hyperglycemic state progressing to DKA, r/o medication 

effect from psychotropic meds; r/o occult seizure disorder


2.   Severe Sepsis with Hypothermia ( 2 possible environmental exposure) 


3.   Hyperkalemia with Azotemia/ Dehydration








PLAN:


1.   IVFs, IV insulin until anion gap resolves. Follow serum K, mg, phos. As he 

is hypothermic, no specific antiarrhythmics needed at his time nor has there 

been any arrhythmia related to hyperkalemia. K should resolve as acidosis 

improves.


2.   f/u cultures, renal dose-adjusted Zosyn and Vanco for renal insufficiency.


3.   Serial lactates till normalized.


4.   Check Serum osmo ( calc osmo= 366), CPK, TFTs, Procalcitonin level, UDS, 

Trops)


5.   Hold Home meds including Cogentin, Haldol, Restoril. 


6.   Passive warming for now with warming blanket and warmed fluids.


7.   Wound consult.


8.   Neurochecks, seizure precautions, elevate HOB in ICU. 





CCU Objective





- Vital Signs / Intake & Output


Vital Signs (Last 4 hours): 


Vital Signs











  Temp Pulse Resp BP Pulse Ox


 


 03/16/18 13:41   91 H  20  106/66  100


 


 03/16/18 13:16  92.4 F L  90  19  113/64  98


 


 03/16/18 12:50   88  24  104/78  100


 


 03/16/18 12:11   101 H   


 


 03/16/18 11:45  91.2 F L  91 H  21  108/61  100


 


 03/16/18 11:19  91.3 F L    


 


 03/16/18 10:40   90  24  120/88  100











Intake and Output (Last 8hrs): 


 Intake & Output











 03/15/18 03/16/18 03/16/18





 22:59 06:59 14:59


 


Weight   145 lb














- Medications


Active Medications: 


Active Medications











Generic Name Dose Route Start Last Admin





  Trade Name Freq  PRN Reason Stop Dose Admin


 


Dextrose  0 ml  03/16/18 10:50  





  Dextrose 50% Inj  IV   





  STAT PRN   





  Hypoglycemia Protocol   





  Protocol   


 


Dextrose  0 gm  03/16/18 10:50  





  Glutose 15  PO   





  ONCE PRN   





  Hypoglycemia Protocol   





  Protocol   


 


Enoxaparin Sodium  30 mg  03/16/18 14:00  





  Lovenox  SC   





  DAILY SUZY   





  Protocol   


 


Glucagon  0 mg  03/16/18 10:50  





  Glucagen Diagnostic Kit  IM   





  STAT PRN   





  Hypoglycemia Protocol   





  Protocol   


 


Sodium Chloride  1,000 mls @ 999 mls/hr  03/16/18 10:15  03/16/18 12:29





  Sodium Chloride 0.9%  IV  03/17/18 10:04  999 mls/hr





  .Q1H1M SUZY   Administration


 


Insulin Human Regular 100  101 mls @ 7.07 mls/hr  03/16/18 11:00  03/16/18 11:20





  units/ Sodium Chloride  IV   7.07 mls/hr





  .[id number] SUZY   Administration





  Protocol   





  7 UNITS/HR   


 


Piperacillin Sod/Tazobactam  100 mls @ 100 mls/hr  03/16/18 19:00  





  Sod 3.375 gm/ Sodium Chloride  IVPB   





  Q6 SUZY   





  Protocol   


 


Pantoprazole Sodium  40 mg  03/17/18 09:00  





  Protonix Ec Tab  PO   





  DAILY SUZY   














- Patient Studies


Lab Studies: 


 Lab Studies











  03/16/18 03/16/18 03/16/18 Range/Units





  12:11 10:26 10:26 


 


WBC    25.6 H  (4.8-10.8)  K/uL


 


RBC    4.78  (4.40-5.90)  Mil/uL


 


Hgb    12.7  (12.0-18.0)  g/dL


 


Hct    42.6  (35.0-51.0)  %


 


MCV    89.1  (80.0-94.0)  fl


 


MCH    26.7 L  (27.0-31.0)  pg


 


MCHC    29.9 L  (33.0-37.0)  g/dL


 


RDW    18.3 H  (11.5-14.5)  %


 


Plt Count    481 H  (130-400)  K/uL


 


MPV    12.0 H  (7.2-11.7)  fl


 


Neut % (Auto)    87.5 H  (50.0-75.0)  %


 


Lymph % (Auto)    5.1 L  (20.0-40.0)  %


 


Mono % (Auto)    6.3  (0.0-10.0)  %


 


Eos % (Auto)    0.1  (0.0-4.0)  %


 


Baso % (Auto)    1.0  (0.0-2.0)  %


 


Neut # (Auto)    22.4 H  (1.8-7.0)  K/uL


 


Lymph # (Auto)    1.3  (1.0-4.3)  K/uL


 


Mono # (Auto)    1.6 H  (0.0-0.8)  K/uL


 


Eos # (Auto)    0.0  (0.0-0.7)  K/uL


 


Baso # (Auto)    0.3 H  (0.0-0.2)  K/uL


 


Neutrophils % (Manual)    80 H  (42-75)  %


 


Band Neutrophils %    5 H  (0-2)  %


 


Lymphocytes % (Manual)    7 L  (20-50)  %


 


Monocytes % (Manual)    6  (0-10)  %


 


Myelocytes %    2 H  (0-0)  %


 


Platelet Estimate    Normal  (NORMAL)  


 


Large Platelets    Present  


 


Giant Platelets    Present  


 


Poikilocytosis (manual    Slight  


 


Anisocytosis (manual)    Slight  


 


Claude Cells    Slight  


 


PT   13.6 H   (9.8-13.1)  Seconds


 


INR   1.2   (0.9-1.2)  


 


APTT   32.5   (25.6-37.1)  Seconds


 


pO2     (30-55)  mm/Hg


 


VBG pH     (7.32-7.43)  


 


VBG pCO2     (40-60)  mmHg


 


VBG HCO3     mmol/L


 


VBG Total CO2     (22-28)  mmol/L


 


VBG O2 Sat (Calc)     (40-65)  %


 


VBG Base Excess     (0.0-2.0)  mmol/L


 


VBG Potassium     (3.6-5.2)  mmol/L


 


Sodium     (132-148)  mmol/L


 


Chloride     ()  mmol/L


 


Glucose     ()  mg/dL


 


Lactate     (0.7-2.1)  mmol/L


 


FiO2     %


 


Crit Value Called To     


 


Crit Value Called By     


 


Crit Value Read Back     


 


Blood Gas Notified Time     


 


Potassium     (3.6-5.0)  MMOL/L


 


Carbon Dioxide     (22-30)  mmol/L


 


Anion Gap     (10-20)  


 


BUN     (9-20)  mg/dl


 


Creatinine     (0.8-1.5)  mg/dl


 


Est GFR (African Amer)     


 


Est GFR (Non-Af Amer)     


 


Random Glucose     ()  mg/dL


 


Calcium     (8.4-10.2)  mg/dL


 


Magnesium     (1.6-2.3)  MG/DL


 


Total Bilirubin     (0.2-1.3)  mg/dl


 


AST     (17-59)  U/L


 


ALT     (21-72)  U/L


 


Alkaline Phosphatase     ()  U/L


 


Total Protein     (6.3-8.2)  G/DL


 


Albumin     (3.5-5.0)  g/dL


 


Globulin     (2.2-3.9)  gm/dL


 


Albumin/Globulin Ratio     (1.0-2.1)  


 


Venous Blood Potassium     (3.6-5.2)  mmol/L


 


Urine Color  Yellow    (YELLOW)  


 


Urine Clarity  Slighty-cloudy    (Clear)  


 


Urine pH  5.0    (5.0-8.0)  


 


Ur Specific Gravity  1.016    (1.003-1.030)  


 


Urine Protein  100    (NEGATIVE)  mg/dL


 


Urine Glucose (UA)  >=500    (Normal)  mg/dL


 


Urine Ketones  80    (NEGATIVE)  mg/dL


 


Urine Blood  Moderate    (NEGATIVE)  


 


Urine Nitrate  Negative    (NEGATIVE)  


 


Urine Bilirubin  Negative    (NEGATIVE)  


 


Urine Urobilinogen  0.2-1.0    (0.2-1.0)  mg/dL


 


Ur Leukocyte Esterase  Neg    (Negative)  Veronica/uL


 


Urine RBC (Auto)  6 H    (0-3)  /hpf


 


Urine Microscopic WBC  1    (0-5)  /hpf


 


Ur Squamous Epith Cells  < 1    (0-5)  /hpf


 


Urine Bacteria  Rare    (<OCC)  


 


Hyaline Casts  6-10 H    (0-2)  /hpf














  03/16/18 03/16/18 Range/Units





  10:26 10:20 


 


WBC    (4.8-10.8)  K/uL


 


RBC    (4.40-5.90)  Mil/uL


 


Hgb    (12.0-18.0)  g/dL


 


Hct    (35.0-51.0)  %


 


MCV    (80.0-94.0)  fl


 


MCH    (27.0-31.0)  pg


 


MCHC    (33.0-37.0)  g/dL


 


RDW    (11.5-14.5)  %


 


Plt Count    (130-400)  K/uL


 


MPV    (7.2-11.7)  fl


 


Neut % (Auto)    (50.0-75.0)  %


 


Lymph % (Auto)    (20.0-40.0)  %


 


Mono % (Auto)    (0.0-10.0)  %


 


Eos % (Auto)    (0.0-4.0)  %


 


Baso % (Auto)    (0.0-2.0)  %


 


Neut # (Auto)    (1.8-7.0)  K/uL


 


Lymph # (Auto)    (1.0-4.3)  K/uL


 


Mono # (Auto)    (0.0-0.8)  K/uL


 


Eos # (Auto)    (0.0-0.7)  K/uL


 


Baso # (Auto)    (0.0-0.2)  K/uL


 


Neutrophils % (Manual)    (42-75)  %


 


Band Neutrophils %    (0-2)  %


 


Lymphocytes % (Manual)    (20-50)  %


 


Monocytes % (Manual)    (0-10)  %


 


Myelocytes %    (0-0)  %


 


Platelet Estimate    (NORMAL)  


 


Large Platelets    


 


Giant Platelets    


 


Poikilocytosis (manual    


 


Anisocytosis (manual)    


 


[place] Cells    


 


PT    (9.8-13.1)  Seconds


 


INR    (0.9-1.2)  


 


APTT    (25.6-37.1)  Seconds


 


pO2   43  (30-55)  mm/Hg


 


VBG pH   6.81 L*  (7.32-7.43)  


 


VBG pCO2   20 L  (40-60)  mmHg


 


VBG HCO3   -0.6  mmol/L


 


VBG Total CO2   3.8 L  (22-28)  mmol/L


 


VBG O2 Sat (Calc)   74.6 H  (40-65)  %


 


VBG Base Excess   -30.5 L  (0.0-2.0)  mmol/L


 


VBG Potassium   6.3 H*  (3.6-5.2)  mmol/L


 


Sodium  147  141.0  (132-148)  mmol/L


 


Chloride  102  97.0 L  ()  mmol/L


 


Glucose   > 750 H*  ()  mg/dL


 


Lactate   4.6 H*  (0.7-2.1)  mmol/L


 


FiO2   21.0  %


 


Crit Value Called To   [facility]  


 


Crit Value Called By   23  


 


Crit Value Read Back   Y  


 


Blood Gas Notified Time   1040  


 


Potassium  5.7 H   (3.6-5.0)  MMOL/L


 


Carbon Dioxide  < 5 L*   (22-30)  mmol/L


 


Anion Gap  46 H   (10-20)  


 


BUN  54 H   (9-20)  mg/dl


 


Creatinine  2.2 H   (0.8-1.5)  mg/dl


 


Est GFR (African Amer)  37   


 


Est GFR (Non-Af Amer)  30   


 


Random Glucose  961 H*   ()  mg/dL


 


Calcium  10.7 H   (8.4-10.2)  mg/dL


 


Magnesium  2.9 H   (1.6-2.3)  MG/DL


 


Total Bilirubin  0.4   (0.2-1.3)  mg/dl


 


AST  22   (17-59)  U/L


 


ALT  6 L   (21-72)  U/L


 


Alkaline Phosphatase  137 H   ()  U/L


 


Total Protein  8.8 H   (6.3-8.2)  G/DL


 


Albumin  3.9   (3.5-5.0)  g/dL


 


Globulin  4.9 H   (2.2-3.9)  gm/dL


 


Albumin/Globulin Ratio  0.8 L   (1.0-2.1)  


 


Venous Blood Potassium   6.3 H*  (3.6-5.2)  mmol/L


 


Urine Color    (YELLOW)  


 


Urine Clarity    (Clear)  


 


Urine pH    (5.0-8.0)  


 


Ur Specific Gravity    (1.003-1.030)  


 


Urine Protein    (NEGATIVE)  mg/dL


 


Urine Glucose (UA)    (Normal)  mg/dL


 


Urine Ketones    (NEGATIVE)  mg/dL


 


Urine Blood    (NEGATIVE)  


 


Urine Nitrate    (NEGATIVE)  


 


Urine Bilirubin    (NEGATIVE)  


 


Urine Urobilinogen    (0.2-1.0)  mg/dL


 


Ur Leukocyte Esterase    (Negative)  Veronica/uL


 


Urine RBC (Auto)    (0-3)  /hpf


 


Urine Microscopic WBC    (0-5)  /hpf


 


Ur Squamous Epith Cells    (0-5)  /hpf


 


Urine Bacteria    (<OCC)  


 


Hyaline Casts    (0-2)  /hpf








 Laboratory Results - last 24 hr











  03/16/18 03/16/18 03/16/18





  10:20 10:26 10:26


 


WBC    25.6 H


 


RBC    4.78


 


Hgb    12.7


 


Hct    42.6


 


MCV    89.1


 


MCH    26.7 L


 


MCHC    29.9 L


 


RDW    18.3 H


 


Plt Count    481 H


 


MPV    12.0 H


 


Neut % (Auto)    87.5 H


 


Lymph % (Auto)    5.1 L


 


Mono % (Auto)    6.3


 


Eos % (Auto)    0.1


 


Baso % (Auto)    1.0


 


Neut # (Auto)    22.4 H


 


Lymph # (Auto)    1.3


 


Mono # (Auto)    1.6 H


 


Eos # (Auto)    0.0


 


Baso # (Auto)    0.3 H


 


Neutrophils % (Manual)    80 H


 


Band Neutrophils %    5 H


 


Lymphocytes % (Manual)    7 L


 


Monocytes % (Manual)    6


 


Myelocytes %    2 H


 


Platelet Estimate    Normal


 


Large Platelets    Present


 


Giant Platelets    Present


 


Poikilocytosis (manual    Slight


 


Anisocytosis (manual)    Slight


 


[place] Cells    Slight


 


PT   


 


INR   


 


APTT   


 


pO2  43  


 


VBG pH  6.81 L*  


 


VBG pCO2  20 L  


 


VBG HCO3  -0.6  


 


VBG Total CO2  3.8 L  


 


VBG O2 Sat (Calc)  74.6 H  


 


VBG Base Excess  -30.5 L  


 


VBG Potassium  6.3 H*  


 


Sodium  141.0  147 


 


Chloride  97.0 L  102 


 


Glucose  > 750 H*  


 


Lactate  4.6 H*  


 


FiO2  21.0  


 


Crit Value Called To  [facility]  


 


Crit Value Called By  23  


 


Crit Value Read Back  Y  


 


Blood Gas Notified Time  1040  


 


Potassium   5.7 H 


 


Carbon Dioxide   < 5 L* 


 


Anion Gap   46 H 


 


BUN   54 H 


 


Creatinine   2.2 H 


 


Est GFR ( Amer)   37 


 


Est GFR (Non-Af Amer)   30 


 


Random Glucose   961 H* 


 


Calcium   10.7 H 


 


Magnesium   2.9 H 


 


Total Bilirubin   0.4 


 


AST   22 


 


ALT   6 L 


 


Alkaline Phosphatase   137 H 


 


Total Protein   8.8 H 


 


Albumin   3.9 


 


Globulin   4.9 H 


 


Albumin/Globulin Ratio   0.8 L 


 


Venous Blood Potassium  6.3 H*  


 


Urine Color   


 


Urine Clarity   


 


Urine pH   


 


Ur Specific Gravity   


 


Urine Protein   


 


Urine Glucose (UA)   


 


Urine Ketones   


 


Urine Blood   


 


Urine Nitrate   


 


Urine Bilirubin   


 


Urine Urobilinogen   


 


Ur Leukocyte Esterase   


 


Urine RBC (Auto)   


 


Urine Microscopic WBC   


 


Ur Squamous Epith Cells   


 


Urine Bacteria   


 


Hyaline Casts   














  03/16/18 03/16/18





  10:26 12:11


 


WBC  


 


RBC  


 


Hgb  


 


Hct  


 


MCV  


 


MCH  


 


MCHC  


 


RDW  


 


Plt Count  


 


MPV  


 


Neut % (Auto)  


 


Lymph % (Auto)  


 


Mono % (Auto)  


 


Eos % (Auto)  


 


Baso % (Auto)  


 


Neut # (Auto)  


 


Lymph # (Auto)  


 


Mono # (Auto)  


 


Eos # (Auto)  


 


Baso # (Auto)  


 


Neutrophils % (Manual)  


 


Band Neutrophils %  


 


Lymphocytes % (Manual)  


 


Monocytes % (Manual)  


 


Myelocytes %  


 


Platelet Estimate  


 


Large Platelets  


 


Giant Platelets  


 


Poikilocytosis (manual  


 


Anisocytosis (manual)  


 


Claude Cells  


 


PT  13.6 H 


 


INR  1.2 


 


APTT  32.5 


 


pO2  


 


VBG pH  


 


VBG pCO2  


 


VBG HCO3  


 


VBG Total CO2  


 


VBG O2 Sat (Calc)  


 


VBG Base Excess  


 


VBG Potassium  


 


Sodium  


 


Chloride  


 


Glucose  


 


Lactate  


 


FiO2  


 


Crit Value Called To  


 


Crit Value Called By  


 


Crit Value Read Back  


 


Blood Gas Notified Time  


 


Potassium  


 


Carbon Dioxide  


 


Anion Gap  


 


BUN  


 


Creatinine  


 


Est GFR ( Amer)  


 


Est GFR (Non-Af Amer)  


 


Random Glucose  


 


Calcium  


 


Magnesium  


 


Total Bilirubin  


 


AST  


 


ALT  


 


Alkaline Phosphatase  


 


Total Protein  


 


Albumin  


 


Globulin  


 


Albumin/Globulin Ratio  


 


Venous Blood Potassium  


 


Urine Color   Yellow


 


Urine Clarity   Slighty-cloudy


 


Urine pH   5.0


 


Ur Specific Gravity   1.016


 


Urine Protein   100


 


Urine Glucose (UA)   >=500


 


Urine Ketones   80


 


Urine Blood   Moderate


 


Urine Nitrate   Negative


 


Urine Bilirubin   Negative


 


Urine Urobilinogen   0.2-1.0


 


Ur Leukocyte Esterase   Neg


 


Urine RBC (Auto)   6 H


 


Urine Microscopic WBC   1


 


Ur Squamous Epith Cells   < 1


 


Urine Bacteria   Rare


 


Hyaline Casts   6-10 H











EKG/Cardiology Studies: 


Cardiology / EKG Studies





03/16/18 10:03


ELECTROCARDIOGRAM Stat 


   Comment: 


   Mode Of Transportation: 


   Reason For Exam: [facility]





03/17/18 09:00


EKG [ELECTROCARDIOGRAM] DAILY 


   Mode Of Transportation: 


   Reason For Exam: f/u hyperacute T's, r/o AMI


   Comment: 











Fingerstick Blood Sugar Results: 500





Review of Systems





- Review of Systems


Systems not reviewed;Unavailable: Altered Mental Status





Critical Care Progress Note





- Ventilator Checklist


Head of Bed 30 Degrees: Yes





- Extremities/Vascular


Does the Patient have a Central Venous Catheter?: No


Does the Patient need a Central Venous Catheter?: No


Does the Patient have a Mcgee Catheter?: No


Does the Patient need a Mcgee Catheter?: No





- Prophylaxis DVT


Prophylaxis DVT: Lovenox





- Nutrition


Nutrition: 


 Nutrition











 Category Date Time Status


 


 NPO Diet [DIET] Diets  03/16/18 Dinner Active

## 2018-03-17 LAB
ALBUMIN SERPL-MCNC: 2.9 G/DL (ref 3.5–5)
ALBUMIN/GLOB SERPL: 0.7 {RATIO} (ref 1–2.1)
ALT SERPL-CCNC: 24 U/L (ref 21–72)
AST SERPL-CCNC: 18 U/L (ref 17–59)
BASOPHILS # BLD AUTO: 0 K/UL (ref 0–0.2)
BASOPHILS NFR BLD: 0.1 % (ref 0–2)
BUN SERPL-MCNC: 39 MG/DL (ref 9–20)
BUN SERPL-MCNC: 43 MG/DL (ref 9–20)
BUN SERPL-MCNC: 44 MG/DL (ref 9–20)
CALCIUM SERPL-MCNC: 9.1 MG/DL (ref 8.4–10.2)
CALCIUM SERPL-MCNC: 9.4 MG/DL (ref 8.4–10.2)
CALCIUM SERPL-MCNC: 9.5 MG/DL (ref 8.4–10.2)
EOSINOPHIL # BLD AUTO: 0 K/UL (ref 0–0.7)
EOSINOPHIL NFR BLD: 0 % (ref 0–4)
ERYTHROCYTE [DISTWIDTH] IN BLOOD BY AUTOMATED COUNT: 16.2 % (ref 11.5–14.5)
GFR NON-AFRICAN AMERICAN: > 60
HGB BLD-MCNC: 10.6 G/DL (ref 12–18)
LYMPHOCYTES # BLD AUTO: 1.1 K/UL (ref 1–4.3)
LYMPHOCYTES NFR BLD AUTO: 3.5 % (ref 20–40)
MCH RBC QN AUTO: 26.6 PG (ref 27–31)
MCHC RBC AUTO-ENTMCNC: 32.5 G/DL (ref 33–37)
MCV RBC AUTO: 82 FL (ref 80–94)
MONOCYTES # BLD: 2.3 K/UL (ref 0–0.8)
MONOCYTES NFR BLD: 7.1 % (ref 0–10)
NEUTROPHILS # BLD: 28.5 K/UL (ref 1.8–7)
NEUTROPHILS NFR BLD AUTO: 89.3 % (ref 50–75)
NRBC BLD AUTO-RTO: 0.1 % (ref 0–0)
PLATELET # BLD: 405 K/UL (ref 130–400)
PMV BLD AUTO: 10.8 FL (ref 7.2–11.7)
RBC # BLD AUTO: 3.97 MIL/UL (ref 4.4–5.9)
WBC # BLD AUTO: 31.9 K/UL (ref 4.8–10.8)

## 2018-03-17 PROCEDURE — 0H9NXZZ DRAINAGE OF LEFT FOOT SKIN, EXTERNAL APPROACH: ICD-10-PCS | Performed by: PODIATRIST

## 2018-03-17 PROCEDURE — 3E0234Z INTRODUCTION OF SERUM, TOXOID AND VACCINE INTO MUSCLE, PERCUTANEOUS APPROACH: ICD-10-PCS | Performed by: FAMILY MEDICINE

## 2018-03-17 RX ADMIN — WATER SCH MLS/HR: 1 INJECTION INTRAMUSCULAR; INTRAVENOUS; SUBCUTANEOUS at 14:26

## 2018-03-17 RX ADMIN — WATER SCH MLS/HR: 1 INJECTION INTRAMUSCULAR; INTRAVENOUS; SUBCUTANEOUS at 09:01

## 2018-03-17 RX ADMIN — POTASSIUM CHLORIDE AND DEXTROSE MONOHYDRATE SCH MLS/HR: 150; 5 INJECTION, SOLUTION INTRAVENOUS at 16:55

## 2018-03-17 RX ADMIN — WATER SCH MLS/HR: 1 INJECTION INTRAMUSCULAR; INTRAVENOUS; SUBCUTANEOUS at 19:47

## 2018-03-17 RX ADMIN — WATER SCH MLS/HR: 1 INJECTION INTRAMUSCULAR; INTRAVENOUS; SUBCUTANEOUS at 01:56

## 2018-03-17 RX ADMIN — INSULIN LISPRO SCH UNITS: 100 INJECTION, SOLUTION INTRAVENOUS; SUBCUTANEOUS at 16:52

## 2018-03-17 RX ADMIN — INSULIN LISPRO SCH UNITS: 100 INJECTION, SOLUTION INTRAVENOUS; SUBCUTANEOUS at 23:03

## 2018-03-17 RX ADMIN — POTASSIUM CHLORIDE AND DEXTROSE MONOHYDRATE SCH MLS/HR: 150; 5 INJECTION, SOLUTION INTRAVENOUS at 13:46

## 2018-03-17 RX ADMIN — INSULIN LISPRO SCH: 100 INJECTION, SOLUTION INTRAVENOUS; SUBCUTANEOUS at 12:55

## 2018-03-17 RX ADMIN — ENOXAPARIN SODIUM SCH MG: 30 INJECTION SUBCUTANEOUS at 12:53

## 2018-03-17 NOTE — CARD
--------------- APPROVED REPORT --------------





EKG Measurement

Heart [id number]

ND [id number]

[id number]

[id number]

[id number]



<Conclusion>

Normal sinus rhythm

Prolonged QT

Abnormal ECG

## 2018-03-17 NOTE — CP.PCM.PCO
Physician Communication Note





- Physician Communication Note


Physician Communication Note: Patient very agitated.

## 2018-03-17 NOTE — CP.PCM.PN
Subjective





- Date & Time of Evaluation


Date of Evaluation: 03/17/18


Time of Evaluation: 12:40





- Subjective


Subjective: 





Patient is much more awake.


Noted increase in WBC


Has no fever


More alert and oriented


Noted glucose around 250.


So far all blood and urine C and S showed neg results.


Noted some hematuria.





Objective





- Vital Signs/Intake and Output


Vital Signs (last 24 hours): 


 











Temp Pulse Resp BP Pulse Ox


 


 98.0 F   84   18   134/30 L  100 


 


 03/17/18 16:16  03/17/18 16:16  03/17/18 16:16  03/17/18 16:16  03/17/18 16:16








Intake and Output: 


 











 03/17/18 03/17/18





 06:59 18:59


 


Intake Total 1476 630


 


Output Total 1000 


 


Balance 476 630














- Medications


Medications: 


 Current Medications





Dextrose (Dextrose 50% Inj)  0 ml IV STAT PRN; Protocol


   PRN Reason: Hypoglycemia Protocol


Dextrose (Glutose 15)  0 gm PO ONCE PRN; Protocol


   PRN Reason: Hypoglycemia Protocol


Enoxaparin Sodium (Lovenox)  30 mg SC DAILY [facility]


   PRN Reason: Protocol


   Last Admin: 03/17/18 12:53 Dose:  30 mg


Glucagon (Glucagen Diagnostic Kit)  0 mg IM STAT PRN; Protocol


   PRN Reason: Hypoglycemia Protocol


Piperacillin Sod/Tazobactam (Sod 3.375 gm/ Sodium Chloride)  100 mls @ 100 mls/

hr IVPB 0200,0800,1400,2000 [facility]


   PRN Reason: Protocol


   Last Admin: 03/17/18 14:26 Dose:  100 mls/hr


Potassium Chloride/Dextrose (Potassium Chl 20 Meq In D5w)  1,000 mls @ 150 mls/

hr IV .Q6H40M [facility]


   Stop: 03/18/18 13:05


   Last Admin: 03/17/18 16:55 Dose:  150 mls/hr


Insulin Detemir (Levemir)  20 units SC HS [facility]


Insulin Human Lispro (Humalog)  8 units SC AC [facility]


   Last Admin: 03/17/18 12:54 Dose:  8 units


Insulin Human Lispro (Humalog)  0 units SC ACHS [facility]


   Last Admin: 03/17/18 16:52 Dose:  3 units


Pantoprazole Sodium (Protonix Inj)  40 mg IVP DAILY [facility]


   Last Admin: 03/17/18 09:04 Dose:  40 mg











- Labs


Labs: 


 





 03/17/18 04:42 





 03/17/18 14:30 





 











PT  13.6 Seconds (9.8-13.1)  H  03/16/18  10:26    


 


INR  1.2  (0.9-1.2)   03/16/18  10:26    


 


APTT  32.5 Seconds (25.6-37.1)   03/16/18  10:26

## 2018-03-17 NOTE — RAD
PROCEDURE:  Radiographs of the left tibia and fibula. 



HISTORY:

DKA, WBC 31.9, R/o soft tisse emphysema



COMPARISON:

None available.



TECHNIQUE:

Three views of the left lower leg were performed for infection and 

soft tissue emphysema.



FINDINGS:



BONES:

No fracture or destructive lesion. No periosteal reaction is seen. No 

erosions are noted.



JOINT SPACES:

Unremarkable.



OTHER FINDINGS:

Visualized soft tissues show no appreciable subcutaneous emphysema or 

radiopaque foreign body. No significant soft tissue swelling is seen. 

Overlying compression device limits evaluation. If there is strong 

clinical concern CT scan could be obtained.



IMPRESSION:

Unremarkable radiographs of the left tibia and fibula.

## 2018-03-17 NOTE — CP.PCM.CON
History of Present Illness





- History of Present Illness


History of Present Illness: 





                                     This 64-year-old man, a diabetic with 

lives alone was found on the floor by his family and brought to the emergency 

room severely ketotic, acidotic with severe hyperglycemia. A blood sample 

obtained at that point has elevated levels of troponin. This consultation was 

called to evaluate this finding. I have reviewed his chart and spoken with his 

son. There is no prior history of chest pain or myocardial infarction or 

symptoms of congestive cardiac failure. The patient had a history of ethanol 

abuse. He has stopped drinking alcohol following pancreatic surgery. The 

patient is a hypertensive. According to his son he has taken his medications 

erratically.


Physical examination shows an elderly man who is quite confused and 

disoriented. He is afebrile with a pulse rate of 76 bpm regular and a blood 

pressure of 134/74 mmHg. His jugular venous pressure was not elevated and there 

was no edema over his lower extremities. A chronic ulcer was evident on his 

left foot. His pedal pulses were extremely feeble. There were no carotid 

bruits. His JVP was not elevated and there was no edema over his lower 

extremities. The apex was not palpable the first and second heart sounds are 

normal there was no murmur or gallop there were no rales. His abdomen was soft 

liver and spleen are not palpable. His electrocardiogram at admission showed 

sinus rhythm with no ST-T abnormalities suggestive of an acute ischemic event. 

Electro-cardiogram taken this morning again shows sinus rhythm with no ST-T 

abnormalities or Q waves. His QTc interval this morning was prolonged. Review 

of his lab tests show significant electrolyte abnormalities particularly 

hypokalemia, which can explain abnormal QTc. The rest of his labs were noted.





Impression: Elevated troponin levels as a consequence of hypoperfusion during 

dehydration and acute diabetic ketoacidosis. There has been no myocardial 

infarction. His diabetic ketoacidosis is being treated appropriately. No 

further cardiac intervention is recommended.





Past Patient History





- Past Social History


Smoking Status: Unknown If Ever Smoked





- CARDIAC


Hx Cardiac Disorders: No





- PULMONARY


Hx Respiratory Disorders: No





- NEUROLOGICAL


Hx Neurological Disorder: No





- RENAL


Hx Chronic Kidney Disease: No





- ENDOCRINE/METABOLIC


Hx Diabetes Mellitus Type 2: Yes





- HEMATOLOGICAL/ONCOLOGICAL


Hx Blood Disorders: No





- MUSCULOSKELETAL/RHEUMATOLOGICAL


Hx Musculoskeletal Disorders: No


Hx Falls: Yes





- GASTROINTESTINAL


Hx Pancreatitis: Yes





- PSYCHIATRIC


Hx Substance Use: No





- SURGICAL HISTORY


Other/Comment: removal of pancreas





- ANESTHESIA


Hx Anesthesia: Yes


Hx Anesthesia Reactions: No





Meds


Allergies/Adverse Reactions: 


 Allergies











Allergy/AdvReac Type Severity Reaction Status Date / Time


 


No Known Allergies Allergy   Verified 03/16/18 09:59














- Medications


Medications: 


 Current Medications





Dextrose (Dextrose 50% Inj)  0 ml IV STAT PRN; Protocol


   PRN Reason: Hypoglycemia Protocol


Dextrose (Glutose 15)  0 gm PO ONCE PRN; Protocol


   PRN Reason: Hypoglycemia Protocol


Enoxaparin Sodium (Lovenox)  30 mg SC DAILY SUZY


   PRN Reason: Protocol


   Last Admin: 03/16/18 20:16 Dose:  30 mg


Glucagon (Glucagen Diagnostic Kit)  0 mg IM STAT PRN; Protocol


   PRN Reason: Hypoglycemia Protocol


Piperacillin Sod/Tazobactam (Sod 3.375 gm/ Sodium Chloride)  100 mls @ 100 mls/

hr IVPB 0200,0800,1400,2000 [facility]


   PRN Reason: Protocol


   Last Admin: 03/17/18 09:01 Dose:  100 mls/hr


Potassium Chloride/Dextrose (Potassium Chl 20 Meq In D5w)  1,000 mls @ 125 mls/

hr IV .Q8H [facility]


   Stop: 03/18/18 06:46


   Last Admin: 03/17/18 06:53 Dose:  125 mls/hr


Potassium Phosphate 30 mmole/ (Sodium Chloride)  260 mls @ 65 mls/hr IV ONCE ONE


   Stop: 03/17/18 12:59


Insulin Detemir (Levemir)  20 units SC HS SUZY


Insulin Human Lispro (Humalog)  8 units SC AC SUZY


Insulin Human Lispro (Humalog)  0 units SC ACHS SUZY


Pantoprazole Sodium (Protonix Inj)  40 mg IVP DAILY [facility]


   Last Admin: 03/17/18 09:04 Dose:  40 mg











Results





- Vital Signs


Recent Vital Signs: 


 Last Vital Signs











Temp  98.0 F   03/17/18 08:00


 


Pulse  80   03/17/18 10:00


 


Resp  21   03/17/18 10:00


 


BP  150/56 L  03/17/18 10:00


 


Pulse Ox  100   03/17/18 10:00














- Labs


Result Diagrams: 


 03/17/18 04:42





 03/17/18 09:04


Labs: 


 Laboratory Results - last 24 hr











  03/16/18 03/16/18 03/16/18





  11:59 13:23 15:56


 


WBC   


 


RBC   


 


Hgb   


 


Hct   


 


MCV   


 


MCH   


 


MCHC   


 


RDW   


 


Plt Count   


 


MPV   


 


Neut % (Auto)   


 


Lymph % (Auto)   


 


Mono % (Auto)   


 


Eos % (Auto)   


 


Baso % (Auto)   


 


Neut # (Auto)   


 


Lymph # (Auto)   


 


Mono # (Auto)   


 


Eos # (Auto)   


 


Baso # (Auto)   


 


ESR   


 


pO2   


 


VBG pH   


 


VBG pCO2   


 


VBG HCO3   


 


VBG Total CO2   


 


VBG O2 Sat (Calc)   


 


VBG Base Excess   


 


VBG Potassium   


 


Sodium   


 


Chloride   


 


Glucose   


 


Lactate   


 


FiO2   


 


Crit Value Called To   


 


Crit Value Called By   


 


Crit Value Read Back   


 


Blood Gas Notified Time   


 


Potassium   


 


Carbon Dioxide   


 


Anion Gap   


 


BUN   


 


Creatinine   


 


Est GFR ( Amer)   


 


Est GFR (Non-Af Amer)   


 


POC Glucose (mg/dL)  > 500 H*  > 500 H*  > 500 H*


 


Random Glucose   


 


Serum Osmolality   


 


Calcium   


 


Phosphorus   


 


Magnesium   


 


Total Bilirubin   


 


AST   


 


ALT   


 


Alkaline Phosphatase   


 


Total Creatine Kinase   


 


Troponin I   


 


Total Protein   


 


Albumin   


 


Globulin   


 


Albumin/Globulin Ratio   


 


Procalcitonin   


 


Free T4   


 


TSH 3rd Generation   


 


Venous Blood Potassium   


 


Random Vancomycin   














  03/16/18 03/16/18 03/16/18





  16:56 17:18 17:18


 


WBC   


 


RBC   


 


Hgb   


 


Hct   


 


MCV   


 


MCH   


 


MCHC   


 


RDW   


 


Plt Count   


 


MPV   


 


Neut % (Auto)   


 


Lymph % (Auto)   


 


Mono % (Auto)   


 


Eos % (Auto)   


 


Baso % (Auto)   


 


Neut # (Auto)   


 


Lymph # (Auto)   


 


Mono # (Auto)   


 


Eos # (Auto)   


 


Baso # (Auto)   


 


ESR   


 


pO2   


 


VBG pH   


 


VBG pCO2   


 


VBG HCO3   


 


VBG Total CO2   


 


VBG O2 Sat (Calc)   


 


VBG Base Excess   


 


VBG Potassium   


 


Sodium   


 


Chloride   


 


Glucose   


 


Lactate   


 


FiO2   


 


Crit Value Called To   


 


Crit Value Called By   


 


Crit Value Read Back   


 


Blood Gas Notified Time   


 


Potassium   


 


Carbon Dioxide   


 


Anion Gap   


 


BUN   


 


Creatinine   


 


Est GFR ( Amer)   


 


Est GFR (Non-Af Amer)   


 


POC Glucose (mg/dL)  > 500 H*  


 


Random Glucose   


 


Serum Osmolality   365 H 


 


Calcium   


 


Phosphorus   


 


Magnesium   


 


Total Bilirubin   


 


AST   


 


ALT   


 


Alkaline Phosphatase   


 


Total Creatine Kinase   


 


Troponin I   


 


Total Protein   


 


Albumin   


 


Globulin   


 


Albumin/Globulin Ratio   


 


Procalcitonin    0.22


 


Free T4   1.24 


 


TSH 3rd Generation   


 


Venous Blood Potassium   


 


Random Vancomycin   














  03/16/18 03/16/18 03/16/18





  17:18 17:18 17:18


 


WBC   


 


RBC   


 


Hgb   


 


Hct   


 


MCV   


 


MCH   


 


MCHC   


 


RDW   


 


Plt Count   


 


MPV   


 


Neut % (Auto)   


 


Lymph % (Auto)   


 


Mono % (Auto)   


 


Eos % (Auto)   


 


Baso % (Auto)   


 


Neut # (Auto)   


 


Lymph # (Auto)   


 


Mono # (Auto)   


 


Eos # (Auto)   


 


Baso # (Auto)   


 


ESR   86 H 


 


pO2    44


 


VBG pH    7.14 L*


 


VBG pCO2    20 L


 


VBG HCO3    8.1


 


VBG Total CO2    7.4 L


 


VBG O2 Sat (Calc)    87.8 H


 


VBG Base Excess    -20.3 L


 


VBG Potassium    3.7


 


Sodium    148.0


 


Chloride    121.0 H


 


Glucose    575 H* D


 


Lactate    1.8


 


FiO2    21.0


 


Crit Value Called To    Lorie wang


 


Crit Value Called By    333


 


Crit Value Read Back    Y


 


Blood Gas Notified Time    1726


 


Potassium   


 


Carbon Dioxide   


 


Anion Gap   


 


BUN   


 


Creatinine   


 


Est GFR ( Amer)   


 


Est GFR (Non-Af Amer)   


 


POC Glucose (mg/dL)   


 


Random Glucose   


 


Serum Osmolality   


 


Calcium   


 


Phosphorus   


 


Magnesium   


 


Total Bilirubin   


 


AST   


 


ALT   


 


Alkaline Phosphatase   


 


Total Creatine Kinase  143  


 


Troponin I   


 


Total Protein   


 


Albumin   


 


Globulin   


 


Albumin/Globulin Ratio   


 


Procalcitonin   


 


Free T4   


 


TSH 3rd Generation  0.60  


 


Venous Blood Potassium    3.7


 


Random Vancomycin   














  03/16/18 03/16/18 03/16/18





  17:21 17:58 18:51


 


WBC   


 


RBC   


 


Hgb   


 


Hct   


 


MCV   


 


MCH   


 


MCHC   


 


RDW   


 


Plt Count   


 


MPV   


 


Neut % (Auto)   


 


Lymph % (Auto)   


 


Mono % (Auto)   


 


Eos % (Auto)   


 


Baso % (Auto)   


 


Neut # (Auto)   


 


Lymph # (Auto)   


 


Mono # (Auto)   


 


Eos # (Auto)   


 


Baso # (Auto)   


 


ESR   


 


pO2   


 


VBG pH   


 


VBG pCO2   


 


VBG HCO3   


 


VBG Total CO2   


 


VBG O2 Sat (Calc)   


 


VBG Base Excess   


 


VBG Potassium   


 


Sodium  154 H  


 


Chloride  118 H  


 


Glucose   


 


Lactate   


 


FiO2   


 


Crit Value Called To   


 


Crit Value Called By   


 


Crit Value Read Back   


 


Blood Gas Notified Time   


 


Potassium  4.0  


 


Carbon Dioxide  6 L* D  


 


Anion Gap  34 H  


 


BUN  51 H  


 


Creatinine  1.5  


 


Est GFR ( Amer)  57  


 


Est GFR (Non-Af Amer)  47  


 


POC Glucose (mg/dL)   454 H*  394 H


 


Random Glucose  570 H* D  


 


Serum Osmolality   


 


Calcium  9.1  


 


Phosphorus  2.2 L  


 


Magnesium  2.4 H  


 


Total Bilirubin   


 


AST   


 


ALT   


 


Alkaline Phosphatase   


 


Total Creatine Kinase   


 


Troponin I   


 


Total Protein   


 


Albumin   


 


Globulin   


 


Albumin/Globulin Ratio   


 


Procalcitonin   


 


Free T4   


 


TSH 3rd Generation   


 


Venous Blood Potassium   


 


Random Vancomycin   














  03/16/18 03/16/18 03/16/18





  20:03 21:07 21:37


 


WBC   


 


RBC   


 


Hgb   


 


Hct   


 


MCV   


 


MCH   


 


MCHC   


 


RDW   


 


Plt Count   


 


MPV   


 


Neut % (Auto)   


 


Lymph % (Auto)   


 


Mono % (Auto)   


 


Eos % (Auto)   


 


Baso % (Auto)   


 


Neut # (Auto)   


 


Lymph # (Auto)   


 


Mono # (Auto)   


 


Eos # (Auto)   


 


Baso # (Auto)   


 


ESR   


 


pO2   


 


VBG pH   


 


VBG pCO2   


 


VBG HCO3   


 


VBG Total CO2   


 


VBG O2 Sat (Calc)   


 


VBG Base Excess   


 


VBG Potassium   


 


Sodium   


 


Chloride   


 


Glucose   


 


Lactate   


 


FiO2   


 


Crit Value Called To   


 


Crit Value Called By   


 


Crit Value Read Back   


 


Blood Gas Notified Time   


 


Potassium   


 


Carbon Dioxide   


 


Anion Gap   


 


BUN   


 


Creatinine   


 


Est GFR ( Amer)   


 


Est GFR (Non-Af Amer)   


 


POC Glucose (mg/dL)  355 H  342 H 


 


Random Glucose   


 


Serum Osmolality   


 


Calcium   


 


Phosphorus   


 


Magnesium   


 


Total Bilirubin   


 


AST   


 


ALT   


 


Alkaline Phosphatase   


 


Total Creatine Kinase   


 


Troponin I    0.8110 H*


 


Total Protein   


 


Albumin   


 


Globulin   


 


Albumin/Globulin Ratio   


 


Procalcitonin   


 


Free T4   


 


TSH 3rd Generation   


 


Venous Blood Potassium   


 


Random Vancomycin   














  03/16/18 03/16/18 03/17/18





  21:53 22:57 00:02


 


WBC   


 


RBC   


 


Hgb   


 


Hct   


 


MCV   


 


MCH   


 


MCHC   


 


RDW   


 


Plt Count   


 


MPV   


 


Neut % (Auto)   


 


Lymph % (Auto)   


 


Mono % (Auto)   


 


Eos % (Auto)   


 


Baso % (Auto)   


 


Neut # (Auto)   


 


Lymph # (Auto)   


 


Mono # (Auto)   


 


Eos # (Auto)   


 


Baso # (Auto)   


 


ESR   


 


pO2   


 


VBG pH   


 


VBG pCO2   


 


VBG HCO3   


 


VBG Total CO2   


 


VBG O2 Sat (Calc)   


 


VBG Base Excess   


 


VBG Potassium   


 


Sodium   


 


Chloride   


 


Glucose   


 


Lactate   


 


FiO2   


 


Crit Value Called To   


 


Crit Value Called By   


 


Crit Value Read Back   


 


Blood Gas Notified Time   


 


Potassium   


 


Carbon Dioxide   


 


Anion Gap   


 


BUN   


 


Creatinine   


 


Est GFR ( Amer)   


 


Est GFR (Non-Af Amer)   


 


POC Glucose (mg/dL)  332 H  330 H  326 H


 


Random Glucose   


 


Serum Osmolality   


 


Calcium   


 


Phosphorus   


 


Magnesium   


 


Total Bilirubin   


 


AST   


 


ALT   


 


Alkaline Phosphatase   


 


Total Creatine Kinase   


 


Troponin I   


 


Total Protein   


 


Albumin   


 


Globulin   


 


Albumin/Globulin Ratio   


 


Procalcitonin   


 


Free T4   


 


TSH 3rd Generation   


 


Venous Blood Potassium   


 


Random Vancomycin   














  03/17/18 03/17/18 03/17/18





  00:58 02:00 03:03


 


WBC   


 


RBC   


 


Hgb   


 


Hct   


 


MCV   


 


MCH   


 


MCHC   


 


RDW   


 


Plt Count   


 


MPV   


 


Neut % (Auto)   


 


Lymph % (Auto)   


 


Mono % (Auto)   


 


Eos % (Auto)   


 


Baso % (Auto)   


 


Neut # (Auto)   


 


Lymph # (Auto)   


 


Mono # (Auto)   


 


Eos # (Auto)   


 


Baso # (Auto)   


 


ESR   


 


pO2   


 


VBG pH   


 


VBG pCO2   


 


VBG HCO3   


 


VBG Total CO2   


 


VBG O2 Sat (Calc)   


 


VBG Base Excess   


 


VBG Potassium   


 


Sodium   


 


Chloride   


 


Glucose   


 


Lactate   


 


FiO2   


 


Crit Value Called To   


 


Crit Value Called By   


 


Crit Value Read Back   


 


Blood Gas Notified Time   


 


Potassium   


 


Carbon Dioxide   


 


Anion Gap   


 


BUN   


 


Creatinine   


 


Est GFR ( Amer)   


 


Est GFR (Non-Af Amer)   


 


POC Glucose (mg/dL)  274 H  244 H  233 H


 


Random Glucose   


 


Serum Osmolality   


 


Calcium   


 


Phosphorus   


 


Magnesium   


 


Total Bilirubin   


 


AST   


 


ALT   


 


Alkaline Phosphatase   


 


Total Creatine Kinase   


 


Troponin I   


 


Total Protein   


 


Albumin   


 


Globulin   


 


Albumin/Globulin Ratio   


 


Procalcitonin   


 


Free T4   


 


TSH 3rd Generation   


 


Venous Blood Potassium   


 


Random Vancomycin   














  03/17/18 03/17/18 03/17/18





  04:02 04:42 04:42


 


WBC   31.9 H 


 


RBC   3.97 L 


 


Hgb   10.6 L D 


 


Hct   32.6 L 


 


MCV   82.0  D 


 


MCH   26.6 L 


 


MCHC   32.5 L 


 


RDW   16.2 H 


 


Plt Count   405 H 


 


MPV   10.8 


 


Neut % (Auto)   89.3 H 


 


Lymph % (Auto)   3.5 L 


 


Mono % (Auto)   7.1 


 


Eos % (Auto)   0.0 


 


Baso % (Auto)   0.1 


 


Neut # (Auto)   28.5 H 


 


Lymph # (Auto)   1.1 


 


Mono # (Auto)   2.3 H 


 


Eos # (Auto)   0.0 


 


Baso # (Auto)   0.0 


 


ESR   


 


pO2   


 


VBG pH   


 


VBG pCO2   


 


VBG HCO3   


 


VBG Total CO2   


 


VBG O2 Sat (Calc)   


 


VBG Base Excess   


 


VBG Potassium   


 


Sodium    163 H*


 


Chloride    128 H


 


Glucose   


 


Lactate   


 


FiO2   


 


Crit Value Called To   


 


Crit Value Called By   


 


Crit Value Read Back   


 


Blood Gas Notified Time   


 


Potassium    3.2 L


 


Carbon Dioxide    17 L


 


Anion Gap    21 H


 


BUN    44 H


 


Creatinine    1.1


 


Est GFR ( Amer)    > 60


 


Est GFR (Non-Af Amer)    > 60


 


POC Glucose (mg/dL)  205 H  


 


Random Glucose    215 H


 


Serum Osmolality   


 


Calcium    9.4


 


Phosphorus    1.4 L


 


Magnesium    2.2


 


Total Bilirubin    0.3


 


AST    18


 


ALT    24


 


Alkaline Phosphatase    87


 


Total Creatine Kinase   


 


Troponin I    0.8850 H*


 


Total Protein    6.9


 


Albumin    2.9 L D


 


Globulin    4.1 H


 


Albumin/Globulin Ratio    0.7 L


 


Procalcitonin   


 


Free T4   


 


TSH 3rd Generation   


 


Venous Blood Potassium   


 


Random Vancomycin   














  03/17/18 03/17/18 03/17/18





  04:42 05:04 06:13


 


WBC   


 


RBC   


 


Hgb   


 


Hct   


 


MCV   


 


MCH   


 


MCHC   


 


RDW   


 


Plt Count   


 


MPV   


 


Neut % (Auto)   


 


Lymph % (Auto)   


 


Mono % (Auto)   


 


Eos % (Auto)   


 


Baso % (Auto)   


 


Neut # (Auto)   


 


Lymph # (Auto)   


 


Mono # (Auto)   


 


Eos # (Auto)   


 


Baso # (Auto)   


 


ESR   


 


pO2   


 


VBG pH   


 


VBG pCO2   


 


VBG HCO3   


 


VBG Total CO2   


 


VBG O2 Sat (Calc)   


 


VBG Base Excess   


 


VBG Potassium   


 


Sodium   


 


Chloride   


 


Glucose   


 


Lactate   


 


FiO2   


 


Crit Value Called To   


 


Crit Value Called By   


 


Crit Value Read Back   


 


Blood Gas Notified Time   


 


Potassium   


 


Carbon Dioxide   


 


Anion Gap   


 


BUN   


 


Creatinine   


 


Est GFR ( Amer)   


 


Est GFR (Non-Af Amer)   


 


POC Glucose (mg/dL)   186 H  161 H


 


Random Glucose   


 


Serum Osmolality   


 


Calcium   


 


Phosphorus   


 


Magnesium   


 


Total Bilirubin   


 


AST   


 


ALT   


 


Alkaline Phosphatase   


 


Total Creatine Kinase   


 


Troponin I   


 


Total Protein   


 


Albumin   


 


Globulin   


 


Albumin/Globulin Ratio   


 


Procalcitonin   


 


Free T4   


 


TSH 3rd Generation   


 


Venous Blood Potassium   


 


Random Vancomycin  8.1  














  03/17/18 03/17/18 03/17/18





  06:58 08:05 09:04


 


WBC   


 


RBC   


 


Hgb   


 


Hct   


 


MCV   


 


MCH   


 


MCHC   


 


RDW   


 


Plt Count   


 


MPV   


 


Neut % (Auto)   


 


Lymph % (Auto)   


 


Mono % (Auto)   


 


Eos % (Auto)   


 


Baso % (Auto)   


 


Neut # (Auto)   


 


Lymph # (Auto)   


 


Mono # (Auto)   


 


Eos # (Auto)   


 


Baso # (Auto)   


 


ESR   


 


pO2   


 


VBG pH   


 


VBG pCO2   


 


VBG HCO3   


 


VBG Total CO2   


 


VBG O2 Sat (Calc)   


 


VBG Base Excess   


 


VBG Potassium   


 


Sodium    162 H*


 


Chloride    127 H


 


Glucose   


 


Lactate   


 


FiO2   


 


Crit Value Called To   


 


Crit Value Called By   


 


Crit Value Read Back   


 


Blood Gas Notified Time   


 


Potassium    3.5 L


 


Carbon Dioxide    17 L


 


Anion Gap    22 H


 


BUN    43 H


 


Creatinine    1.1


 


Est GFR ( Amer)    > 60


 


Est GFR (Non-Af Amer)    > 60


 


POC Glucose (mg/dL)  147 H  185 H 


 


Random Glucose    190 H


 


Serum Osmolality   


 


Calcium    9.5


 


Phosphorus   


 


Magnesium   


 


Total Bilirubin   


 


AST   


 


ALT   


 


Alkaline Phosphatase   


 


Total Creatine Kinase   


 


Troponin I   


 


Total Protein   


 


Albumin   


 


Globulin   


 


Albumin/Globulin Ratio   


 


Procalcitonin   


 


Free T4   


 


TSH 3rd Generation   


 


Venous Blood Potassium   


 


Random Vancomycin   














  03/17/18 03/17/18





  09:17 11:43


 


WBC  


 


RBC  


 


Hgb  


 


Hct  


 


MCV  


 


MCH  


 


MCHC  


 


RDW  


 


Plt Count  


 


MPV  


 


Neut % (Auto)  


 


Lymph % (Auto)  


 


Mono % (Auto)  


 


Eos % (Auto)  


 


Baso % (Auto)  


 


Neut # (Auto)  


 


Lymph # (Auto)  


 


Mono # (Auto)  


 


Eos # (Auto)  


 


Baso # (Auto)  


 


ESR  


 


pO2  


 


VBG pH  


 


VBG pCO2  


 


VBG HCO3  


 


VBG Total CO2  


 


VBG O2 Sat (Calc)  


 


VBG Base Excess  


 


VBG Potassium  


 


Sodium  


 


Chloride  


 


Glucose  


 


Lactate  


 


FiO2  


 


Crit Value Called To  


 


Crit Value Called By  


 


Crit Value Read Back  


 


Blood Gas Notified Time  


 


Potassium  


 


Carbon Dioxide  


 


Anion Gap  


 


BUN  


 


Creatinine  


 


Est GFR ( Amer)  


 


Est GFR (Non-Af Amer)  


 


POC Glucose (mg/dL)  169 H  234 H


 


Random Glucose  


 


Serum Osmolality  


 


Calcium  


 


Phosphorus  


 


Magnesium  


 


Total Bilirubin  


 


AST  


 


ALT  


 


Alkaline Phosphatase  


 


Total Creatine Kinase  


 


Troponin I  


 


Total Protein  


 


Albumin  


 


Globulin  


 


Albumin/Globulin Ratio  


 


Procalcitonin  


 


Free T4  


 


TSH 3rd Generation  


 


Venous Blood Potassium  


 


Random Vancomycin

## 2018-03-17 NOTE — CON
DATE:



ENDOCRINOLOGY CONSULTATION



LOCATION:  Room 424, ICU



HISTORY OF PRESENT ILLNESS:  This is a 67-year-old male with known history

of type 2 insulin-requiring diabetes presenting here with diabetic

ketoacidosis and dehydration and is now being referred for diabetic

evaluation and management.  He was actually found barely responsive at the

home by his brother with altered mental status and confusion and marked

generalized body weakness and was then rushed here to the Emergency Room

and was found to be in diabetic ketoacidosis, hypothermia, and dehydration

and has since then been started on an insulin drip infusion overnight with

vigorous IV hydration as given.



PAST MEDICAL HISTORY:  As mentioned above, history of type 2

insulin-requiring diabetes, apparently using Novolin NPH at 12 units subcu

b.i.d. as given.  He was apparently fairly compliant with his medications

as per the family history, of hypertension and dyslipidemia, history of

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

and has been insulin-requiring since then.  History of diabetic

polyneuropathy and also the intercurrent left foot ulceration with

underlying vasculopathy.



FAMILY HISTORY:  Positive for hypertension and diabetes.



SOCIAL HISTORY:  The patient was supportive, presently lives alone with

significant history of chronic alcoholism and subsequent pancreatic

resection thereof.  No other known substance use.



REVIEW OF SYSTEMS:  Not possible at this time because of the patient's

altered mental status, although is fully wake and responsive.



PHYSICAL EXAMINATION:

GENERAL:  This is an average built male in no apparent distress.

VITAL SIGNS:  With blood pressure of 140/80, pulse of 70 beats per minute

and regular, temperature of 98, respirations of 20, height is 5 feet 7

inches, and weight is 145 pounds.

HEENT:  Head normocephalic.  Eyes anicteric with pink conjunctivae. 

Funduscopy is not possible at this time.  Ears, nose, and throat otherwise

is normal.

NECK:  Supple.  Thyroid gland is normal in size.  No carotid bruits or

cervical adenopathy.

CARDIOPULMONARY:  Some adynamic precordium.  S1 and S2 is rapid and

regular.  Lungs are clear to auscultation.

ABDOMEN:  Flat and soft with positive bowel sounds.

EXTREMITIES:  There is +1 bipedal edema.  Pulses are diminished

peripherally.  There is a necrotic ulceration with an eschar confirmation

in the dorsolateral aspect of the left foot as noted with underlying

erythema and edema and hyperpigmentation.







LABORATORY DATA:  The current chemistry showed BUN of 44, sodium of 163,

potassium of 3.2, chloride of 128, CO2 of 17, glucose of 215, and

creatinine of 1.1.  His glucose levels have ranged from 169 mg/dL to 185

mg/dL.  The initial CO2 was __6___ with sodium of 154 and initial random

glucose of 570.  Troponin 0.81.



ASSESSMENT:  This is a 67-year-old male with uncontrolled and decompensated

type 2 insulin-requiring diabetes presenting here with diabetic

ketoacidosis and dehydration and supervening spurious hyperkalemia with

prerenal azotemia.  He was also found barely responsive at home with

continued altered mental status and this clearly is also very typical in

patients with high degree of hyperosmolality and dehydration and the

possibility always of cerebral edema has to be excluded at this time.



PLAN:  Plan of management as discussed with the nursing staff, we will

modify his current insulin regimen and since the CO2 is now 17, we would

safely discontinue the insulin drip at this time and switch him over to a

more physiologic basal and bolus insulin drug combination as ordered.  We

will start him with Humalog given as 8 units subcu t.i.d. before meals to

start at lunchtime this morning as ordered.  We will also add Levemir given

as basal insulin at 20 units subcu at bedtime daily to start tonight.  We

will modify the coverage scale to obviate hypoglycemia and detailed orders

have been given.  We will continue the vigorous IV hydration at this time

and he has been switched over by the intensivist to D5W with KCl infusion

as given and we will increase the infusion rate to 150 mL/hour as ordered. 

We will obtain serial chemistries and supplement accordingly as needed.  We

will follow and advice accordingly.





__________________________________________

Bernie Collins MD





DD:  03/17/2018 10:25:47

DT:  03/17/2018 10:29:48

Job # [id number]



[id number]

## 2018-03-17 NOTE — CP.PCM.PN
Subjective





- Date & Time of Evaluation


Date of Evaluation: 03/17/18


Time of Evaluation: 22:21





- Subjective


Subjective: 





Podiatry Consult Note - Dr. Ascencio





Hx taken from chart - patient has AMS





67 year old male patient PMHx IDDM, s/p pancreatic resection seen at bedside 

for left foot wound. Patient still experiencing AMS. Per nursing, no acute 

overnight events, no further pedal complaints.





Objective





- Vital Signs/Intake and Output


Vital Signs (last 24 hours): 


 











Temp Pulse Resp BP Pulse Ox


 


 97.1 F L  83   24   135/73   100 


 


 03/17/18 20:00  03/17/18 20:00  03/17/18 20:00  03/17/18 20:00  03/17/18 20:00








Intake and Output: 


 











 03/17/18 03/18/18





 18:59 06:59


 


Intake Total 3330 400


 


Output Total 700 


 


Balance 2630 400














- Medications


Medications: 


 Current Medications





Dextrose (Dextrose 50% Inj)  0 ml IV STAT PRN; Protocol


   PRN Reason: Hypoglycemia Protocol


Dextrose (Glutose 15)  0 gm PO ONCE PRN; Protocol


   PRN Reason: Hypoglycemia Protocol


Enoxaparin Sodium (Lovenox)  30 mg SC DAILY [facility]


   PRN Reason: Protocol


   Last Admin: 03/17/18 12:53 Dose:  30 mg


Glucagon (Glucagen Diagnostic Kit)  0 mg IM STAT PRN; Protocol


   PRN Reason: Hypoglycemia Protocol


Piperacillin Sod/Tazobactam (Sod 3.375 gm/ Sodium Chloride)  100 mls @ 100 mls/

hr IVPB 0200,0800,1400,2000 [facility]


   PRN Reason: Protocol


   Last Admin: 03/17/18 19:47 Dose:  100 mls/hr


Potassium Chloride/Dextrose (Potassium Chl 20 Meq In D5w)  1,000 mls @ 150 mls/

hr IV .Q6H40M [facility]


   Stop: 03/18/18 13:05


   Last Admin: 03/17/18 16:55 Dose:  150 mls/hr


Insulin Detemir (Levemir)  20 units SC HS [facility]


Insulin Human Lispro (Humalog)  8 units SC AC [facility]


   Last Admin: 03/17/18 12:54 Dose:  8 units


Insulin Human Lispro (Humalog)  0 units SC ACHS [facility]


   Last Admin: 03/17/18 16:52 Dose:  3 units


Pantoprazole Sodium (Protonix Inj)  40 mg IVP DAILY [facility]


   Last Admin: 03/17/18 09:04 Dose:  40 mg











- Labs


Labs: 


 





 03/17/18 04:42 





 03/17/18 14:30 





 











PT  13.6 Seconds (9.8-13.1)  H  03/16/18  10:26    


 


INR  1.2  (0.9-1.2)   03/16/18  10:26    


 


APTT  32.5 Seconds (25.6-37.1)   03/16/18  10:26    














- Constitutional


Appears: Non-toxic, Combative, Agitated





- Extremities Exam


Additional comments: 





VASC: LLE DP and PT pulses nonpalpable. RLE DP pulse weakly palpalbe 1/4, PT 

nonpalpable. CFT <3 seconds to all digits x10. Mild nonpitting edmea noted to 

left foot.





NEURO: Unable to assess due to AMS





DERM: Necrotic eschar noted to dorsolateral left foot with periwound skin 

erosion - eschar is hard/firm except for area on lateral foot which is boggy to 

palpation and fluctuant to touch; periwound erythema noted.





ORTHO: Unable to assess pain. LLE ankle joint ROM limited





- Neurological Exam


Neurological Exam: Alert, Awake





Assessment and Plan





- Assessment and Plan (Free Text)


Assessment: 





67M with unstageable necrotic eschar to dorsum of left foot, r/o source sepsis


Plan: 





Patient seen and evaluated at bedside


Afebrile, WBC 31.9


Plan discussed with attending Dr. Ascencio


Tib/fib xrays ordered and reviewed; negative for soft tissue emphysema


MRI ordered but patient was too combative so no imaging taken


Bedside I and D performed without incident to area of most fluctuance on left 

foot. Roughly 3 cc of serosanguinous drainage noted and cultured


Wound dressed with gauze, ABD, kirlix


Multipodus boots placed b/l


No surgical intervention planned at this time


Podiatry will continue to follow while patient in house

## 2018-03-17 NOTE — CP.CCUPN
CCU Subjective





- Physician Review


Events Since Last Encounter (Free Text): 





03/17/18 08:53


Still confused, but not in any distress, no resp distress, still has high Na, 

low K, low P and low serum bicarb but better now 17. 





CCU Objective





- Vital Signs / Intake & Output


Vital Signs (Last 4 hours): 


Vital Signs











  Temp Pulse Resp BP Pulse Ox


 


 03/17/18 08:00  98.0 F  83  16  158/70 H  90 L


 


 03/17/18 06:00   16 L  19  117/86  98











Intake and Output (Last 8hrs): 


 Intake & Output











 03/16/18 03/17/18 03/17/18





 22:59 06:59 14:59


 


Intake Total 4726 750 


 


Output Total 1500 1000 


 


Balance 3226 -250 


 


Intake:   


 


  IV 4626 650 


 


  Intake, Piggyback 100 100 


 


Output:   


 


  Urine 1500 1000 


 


    Urethral (Mcgee) 1500 1000 


 


Other:   


 


  # Bowel Movements 1  














- Physical Exam


Narrative Physical Exam (Free Text): 





03/17/18 08:54





P/E


Neck: No JVD


Lungs: No ronchi, crackles


Abdomen: soft, non-tender


Ext: No edema


heart: no gallop





- Medications


Active Medications: 


Active Medications











Generic Name Dose Route Start Last Admin





  Trade Name Freq  PRN Reason Stop Dose Admin


 


Dextrose  0 ml  03/16/18 10:50  





  Dextrose 50% Inj  IV   





  STAT PRN   





  Hypoglycemia Protocol   





  Protocol   


 


Dextrose  0 gm  03/16/18 10:50  





  Glutose 15  PO   





  ONCE PRN   





  Hypoglycemia Protocol   





  Protocol   


 


Enoxaparin Sodium  30 mg  03/16/18 14:00  03/16/18 20:16





  Lovenox  SC   30 mg





  DAILY SUZY   Administration





  Protocol   


 


Glucagon  0 mg  03/16/18 10:50  





  Glucagen Diagnostic Kit  IM   





  STAT PRN   





  Hypoglycemia Protocol   





  Protocol   


 


Sodium Chloride  1,000 mls @ 999 mls/hr  03/16/18 10:15  03/16/18 12:29





  Sodium Chloride 0.9%  IV  03/17/18 10:04  999 mls/hr





  .Q1H1M SUZY   Administration


 


Insulin Human Regular 100  101 mls @ 7.07 mls/hr  03/16/18 11:00  03/17/18 06:59





  units/ Sodium Chloride  IV   0 units/hr





  .[id number] SUZY   0 mls/hr





  Protocol   Titration





  7 UNITS/HR   


 


Sodium Chloride  1,000 mls @ 150 mls/hr  03/16/18 20:15  03/17/18 02:03





  Sodium Chloride 0.9%  IV  03/17/18 20:04  150 mls/hr





  .Q6H40M SUZY   Administration


 


Piperacillin Sod/Tazobactam  100 mls @ 100 mls/hr  03/17/18 02:00  03/17/18 01:

56





  Sod 3.375 gm/ Sodium Chloride  IVPB   100 mls/hr





  0200,0800,1400,2000 SUZY   Administration





  Protocol   


 


Potassium Chloride/Dextrose  1,000 mls @ 125 mls/hr  03/17/18 06:45  03/17/18 06

:53





  Potassium Chl 20 Meq In D5w  IV  03/18/18 06:46  125 mls/hr





  .Q8H SUZY   Administration


 


Potassium Phosphate 30 mmole/  260 mls @ 65 mls/hr  03/17/18 08:51  





  Sodium Chloride  IV  03/17/18 12:50  





  ONCE ONE   


 


Pantoprazole Sodium  40 mg  03/17/18 09:00  





  Protonix Inj  IVP   





  DAILY SUZY   














- Patient Studies


Lab Studies: 


 Lab Studies











  03/17/18 03/17/18 03/17/18 Range/Units





  08:05 06:58 06:13 


 


WBC     (4.8-10.8)  K/uL


 


RBC     (4.40-5.90)  Mil/uL


 


Hgb     (12.0-18.0)  g/dL


 


Hct     (35.0-51.0)  %


 


MCV     (80.0-94.0)  fl


 


MCH     (27.0-31.0)  pg


 


MCHC     (33.0-37.0)  g/dL


 


RDW     (11.5-14.5)  %


 


Plt Count     (130-400)  K/uL


 


MPV     (7.2-11.7)  fl


 


Neut % (Auto)     (50.0-75.0)  %


 


Lymph % (Auto)     (20.0-40.0)  %


 


Mono % (Auto)     (0.0-10.0)  %


 


Eos % (Auto)     (0.0-4.0)  %


 


Baso % (Auto)     (0.0-2.0)  %


 


Neut # (Auto)     (1.8-7.0)  K/uL


 


Lymph # (Auto)     (1.0-4.3)  K/uL


 


Mono # (Auto)     (0.0-0.8)  K/uL


 


Eos # (Auto)     (0.0-0.7)  K/uL


 


Baso # (Auto)     (0.0-0.2)  K/uL


 


Neutrophils % (Manual)     (42-75)  %


 


Band Neutrophils %     (0-2)  %


 


Lymphocytes % (Manual)     (20-50)  %


 


Monocytes % (Manual)     (0-10)  %


 


Myelocytes %     (0-0)  %


 


Platelet Estimate     (NORMAL)  


 


Large Platelets     


 


Giant Platelets     


 


Poikilocytosis (manual     


 


Anisocytosis (manual)     


 


Bighorn Cells     


 


ESR     (0-20)  mm/hr


 


PT     (9.8-13.1)  Seconds


 


INR     (0.9-1.2)  


 


APTT     (25.6-37.1)  Seconds


 


pO2     (30-55)  mm/Hg


 


VBG pH     (7.32-7.43)  


 


VBG pCO2     (40-60)  mmHg


 


VBG HCO3     mmol/L


 


VBG Total CO2     (22-28)  mmol/L


 


VBG O2 Sat (Calc)     (40-65)  %


 


VBG Base Excess     (0.0-2.0)  mmol/L


 


VBG Potassium     (3.6-5.2)  mmol/L


 


Sodium     (132-148)  mmol/L


 


Chloride     ()  mmol/L


 


Glucose     ()  mg/dL


 


Lactate     (0.7-2.1)  mmol/L


 


FiO2     %


 


Crit Value Called To     


 


Crit Value Called By     


 


Crit Value Read Back     


 


Blood Gas Notified Time     


 


Potassium     (3.6-5.0)  MMOL/L


 


Carbon Dioxide     (22-30)  mmol/L


 


Anion Gap     (10-20)  


 


BUN     (9-20)  mg/dl


 


Creatinine     (0.8-1.5)  mg/dl


 


Est GFR (African Amer)     


 


Est GFR (Non-Af Amer)     


 


POC Glucose (mg/dL)  185 H  147 H  161 H  ()  mg/dL


 


Random Glucose     ()  mg/dL


 


Serum Osmolality     (272-300)  mosm/kg


 


Calcium     (8.4-10.2)  mg/dL


 


Phosphorus     (2.5-4.5)  mg/dl


 


Magnesium     (1.6-2.3)  MG/DL


 


Total Bilirubin     (0.2-1.3)  mg/dl


 


AST     (17-59)  U/L


 


ALT     (21-72)  U/L


 


Alkaline Phosphatase     ()  U/L


 


Total Creatine Kinase     ()  U/L


 


Troponin I     (0.00-0.120)  ng/mL


 


Total Protein     (6.3-8.2)  G/DL


 


Albumin     (3.5-5.0)  g/dL


 


Globulin     (2.2-3.9)  gm/dL


 


Albumin/Globulin Ratio     (1.0-2.1)  


 


Procalcitonin     (0.19-0.49)  NG/ML


 


Free T4     (0.78-2.19)  ng/dL


 


TSH 3rd Generation     (0.46-4.68)  mIU/ML


 


Venous Blood Potassium     (3.6-5.2)  mmol/L


 


Urine Color     (YELLOW)  


 


Urine Clarity     (Clear)  


 


Urine pH     (5.0-8.0)  


 


Ur Specific Gravity     (1.003-1.030)  


 


Urine Protein     (NEGATIVE)  mg/dL


 


Urine Glucose (UA)     (Normal)  mg/dL


 


Urine Ketones     (NEGATIVE)  mg/dL


 


Urine Blood     (NEGATIVE)  


 


Urine Nitrate     (NEGATIVE)  


 


Urine Bilirubin     (NEGATIVE)  


 


Urine Urobilinogen     (0.2-1.0)  mg/dL


 


Ur Leukocyte Esterase     (Negative)  Veronica/uL


 


Urine RBC (Auto)     (0-3)  /hpf


 


Urine Microscopic WBC     (0-5)  /hpf


 


Ur Squamous Epith Cells     (0-5)  /hpf


 


Urine Bacteria     (<OCC)  


 


Hyaline Casts     (0-2)  /hpf


 


Random Vancomycin     ug/mL














  03/17/18 03/17/18 03/17/18 Range/Units





  05:04 04:42 04:42 


 


WBC     (4.8-10.8)  K/uL


 


RBC     (4.40-5.90)  Mil/uL


 


Hgb     (12.0-18.0)  g/dL


 


Hct     (35.0-51.0)  %


 


MCV     (80.0-94.0)  fl


 


MCH     (27.0-31.0)  pg


 


MCHC     (33.0-37.0)  g/dL


 


RDW     (11.5-14.5)  %


 


Plt Count     (130-400)  K/uL


 


MPV     (7.2-11.7)  fl


 


Neut % (Auto)     (50.0-75.0)  %


 


Lymph % (Auto)     (20.0-40.0)  %


 


Mono % (Auto)     (0.0-10.0)  %


 


Eos % (Auto)     (0.0-4.0)  %


 


Baso % (Auto)     (0.0-2.0)  %


 


Neut # (Auto)     (1.8-7.0)  K/uL


 


Lymph # (Auto)     (1.0-4.3)  K/uL


 


Mono # (Auto)     (0.0-0.8)  K/uL


 


Eos # (Auto)     (0.0-0.7)  K/uL


 


Baso # (Auto)     (0.0-0.2)  K/uL


 


Neutrophils % (Manual)     (42-75)  %


 


Band Neutrophils %     (0-2)  %


 


Lymphocytes % (Manual)     (20-50)  %


 


Monocytes % (Manual)     (0-10)  %


 


Myelocytes %     (0-0)  %


 


Platelet Estimate     (NORMAL)  


 


Large Platelets     


 


Giant Platelets     


 


Poikilocytosis (manual     


 


Anisocytosis (manual)     


 


Bighorn Cells     


 


ESR     (0-20)  mm/hr


 


PT     (9.8-13.1)  Seconds


 


INR     (0.9-1.2)  


 


APTT     (25.6-37.1)  Seconds


 


pO2     (30-55)  mm/Hg


 


VBG pH     (7.32-7.43)  


 


VBG pCO2     (40-60)  mmHg


 


VBG HCO3     mmol/L


 


VBG Total CO2     (22-28)  mmol/L


 


VBG O2 Sat (Calc)     (40-65)  %


 


VBG Base Excess     (0.0-2.0)  mmol/L


 


VBG Potassium     (3.6-5.2)  mmol/L


 


Sodium    163 H*  (132-148)  mmol/L


 


Chloride    128 H  ()  mmol/L


 


Glucose     ()  mg/dL


 


Lactate     (0.7-2.1)  mmol/L


 


FiO2     %


 


Crit Value Called To     


 


Crit Value Called By     


 


Crit Value Read Back     


 


Blood Gas Notified Time     


 


Potassium    3.2 L  (3.6-5.0)  MMOL/L


 


Carbon Dioxide    17 L  (22-30)  mmol/L


 


Anion Gap    21 H  (10-20)  


 


BUN    44 H  (9-20)  mg/dl


 


Creatinine    1.1  (0.8-1.5)  mg/dl


 


Est GFR (African Amer)    > 60  


 


Est GFR (Non-Af Amer)    > 60  


 


POC Glucose (mg/dL)  186 H    ()  mg/dL


 


Random Glucose    215 H  ()  mg/dL


 


Serum Osmolality     (272-300)  mosm/kg


 


Calcium    9.4  (8.4-10.2)  mg/dL


 


Phosphorus    1.4 L  (2.5-4.5)  mg/dl


 


Magnesium    2.2  (1.6-2.3)  MG/DL


 


Total Bilirubin    0.3  (0.2-1.3)  mg/dl


 


AST    18  (17-59)  U/L


 


ALT    24  (21-72)  U/L


 


Alkaline Phosphatase    87  ()  U/L


 


Total Creatine Kinase     ()  U/L


 


Troponin I    0.8850 H*  (0.00-0.120)  ng/mL


 


Total Protein    6.9  (6.3-8.2)  G/DL


 


Albumin    2.9 L D  (3.5-5.0)  g/dL


 


Globulin    4.1 H  (2.2-3.9)  gm/dL


 


Albumin/Globulin Ratio    0.7 L  (1.0-2.1)  


 


Procalcitonin     (0.19-0.49)  NG/ML


 


Free T4     (0.78-2.19)  ng/dL


 


TSH 3rd Generation     (0.46-4.68)  mIU/ML


 


Venous Blood Potassium     (3.6-5.2)  mmol/L


 


Urine Color     (YELLOW)  


 


Urine Clarity     (Clear)  


 


Urine pH     (5.0-8.0)  


 


Ur Specific Gravity     (1.003-1.030)  


 


Urine Protein     (NEGATIVE)  mg/dL


 


Urine Glucose (UA)     (Normal)  mg/dL


 


Urine Ketones     (NEGATIVE)  mg/dL


 


Urine Blood     (NEGATIVE)  


 


Urine Nitrate     (NEGATIVE)  


 


Urine Bilirubin     (NEGATIVE)  


 


Urine Urobilinogen     (0.2-1.0)  mg/dL


 


Ur Leukocyte Esterase     (Negative)  Veronica/uL


 


Urine RBC (Auto)     (0-3)  /hpf


 


Urine Microscopic WBC     (0-5)  /hpf


 


Ur Squamous Epith Cells     (0-5)  /hpf


 


Urine Bacteria     (<OCC)  


 


Hyaline Casts     (0-2)  /hpf


 


Random Vancomycin   8.1   ug/mL














  03/17/18 03/17/18 03/17/18 Range/Units





  04:42 04:02 03:03 


 


WBC  31.9 H    (4.8-10.8)  K/uL


 


RBC  3.97 L    (4.40-5.90)  Mil/uL


 


Hgb  10.6 L D    (12.0-18.0)  g/dL


 


Hct  32.6 L    (35.0-51.0)  %


 


MCV  82.0  D    (80.0-94.0)  fl


 


MCH  26.6 L    (27.0-31.0)  pg


 


MCHC  32.5 L    (33.0-37.0)  g/dL


 


RDW  16.2 H    (11.5-14.5)  %


 


Plt Count  405 H    (130-400)  K/uL


 


MPV  10.8    (7.2-11.7)  fl


 


Neut % (Auto)  89.3 H    (50.0-75.0)  %


 


Lymph % (Auto)  3.5 L    (20.0-40.0)  %


 


Mono % (Auto)  7.1    (0.0-10.0)  %


 


Eos % (Auto)  0.0    (0.0-4.0)  %


 


Baso % (Auto)  0.1    (0.0-2.0)  %


 


Neut # (Auto)  28.5 H    (1.8-7.0)  K/uL


 


Lymph # (Auto)  1.1    (1.0-4.3)  K/uL


 


Mono # (Auto)  2.3 H    (0.0-0.8)  K/uL


 


Eos # (Auto)  0.0    (0.0-0.7)  K/uL


 


Baso # (Auto)  0.0    (0.0-0.2)  K/uL


 


Neutrophils % (Manual)     (42-75)  %


 


Band Neutrophils %     (0-2)  %


 


Lymphocytes % (Manual)     (20-50)  %


 


Monocytes % (Manual)     (0-10)  %


 


Myelocytes %     (0-0)  %


 


Platelet Estimate     (NORMAL)  


 


Large Platelets     


 


Giant Platelets     


 


Poikilocytosis (manual     


 


Anisocytosis (manual)     


 


Bighorn Cells     


 


ESR     (0-20)  mm/hr


 


PT     (9.8-13.1)  Seconds


 


INR     (0.9-1.2)  


 


APTT     (25.6-37.1)  Seconds


 


pO2     (30-55)  mm/Hg


 


VBG pH     (7.32-7.43)  


 


VBG pCO2     (40-60)  mmHg


 


VBG HCO3     mmol/L


 


VBG Total CO2     (22-28)  mmol/L


 


VBG O2 Sat (Calc)     (40-65)  %


 


VBG Base Excess     (0.0-2.0)  mmol/L


 


VBG Potassium     (3.6-5.2)  mmol/L


 


Sodium     (132-148)  mmol/L


 


Chloride     ()  mmol/L


 


Glucose     ()  mg/dL


 


Lactate     (0.7-2.1)  mmol/L


 


FiO2     %


 


Crit Value Called To     


 


Crit Value Called By     


 


Crit Value Read Back     


 


Blood Gas Notified Time     


 


Potassium     (3.6-5.0)  MMOL/L


 


Carbon Dioxide     (22-30)  mmol/L


 


Anion Gap     (10-20)  


 


BUN     (9-20)  mg/dl


 


Creatinine     (0.8-1.5)  mg/dl


 


Est GFR (African Amer)     


 


Est GFR (Non-Af Amer)     


 


POC Glucose (mg/dL)   205 H  233 H  ()  mg/dL


 


Random Glucose     ()  mg/dL


 


Serum Osmolality     (272-300)  mosm/kg


 


Calcium     (8.4-10.2)  mg/dL


 


Phosphorus     (2.5-4.5)  mg/dl


 


Magnesium     (1.6-2.3)  MG/DL


 


Total Bilirubin     (0.2-1.3)  mg/dl


 


AST     (17-59)  U/L


 


ALT     (21-72)  U/L


 


Alkaline Phosphatase     ()  U/L


 


Total Creatine Kinase     ()  U/L


 


Troponin I     (0.00-0.120)  ng/mL


 


Total Protein     (6.3-8.2)  G/DL


 


Albumin     (3.5-5.0)  g/dL


 


Globulin     (2.2-3.9)  gm/dL


 


Albumin/Globulin Ratio     (1.0-2.1)  


 


Procalcitonin     (0.19-0.49)  NG/ML


 


Free T4     (0.78-2.19)  ng/dL


 


TSH 3rd Generation     (0.46-4.68)  mIU/ML


 


Venous Blood Potassium     (3.6-5.2)  mmol/L


 


Urine Color     (YELLOW)  


 


Urine Clarity     (Clear)  


 


Urine pH     (5.0-8.0)  


 


Ur Specific Gravity     (1.003-1.030)  


 


Urine Protein     (NEGATIVE)  mg/dL


 


Urine Glucose (UA)     (Normal)  mg/dL


 


Urine Ketones     (NEGATIVE)  mg/dL


 


Urine Blood     (NEGATIVE)  


 


Urine Nitrate     (NEGATIVE)  


 


Urine Bilirubin     (NEGATIVE)  


 


Urine Urobilinogen     (0.2-1.0)  mg/dL


 


Ur Leukocyte Esterase     (Negative)  Veronica/uL


 


Urine RBC (Auto)     (0-3)  /hpf


 


Urine Microscopic WBC     (0-5)  /hpf


 


Ur Squamous Epith Cells     (0-5)  /hpf


 


Urine Bacteria     (<OCC)  


 


Hyaline Casts     (0-2)  /hpf


 


Random Vancomycin     ug/mL














  03/17/18 03/17/18 03/17/18 Range/Units





  02:00 00:58 00:02 


 


WBC     (4.8-10.8)  K/uL


 


RBC     (4.40-5.90)  Mil/uL


 


Hgb     (12.0-18.0)  g/dL


 


Hct     (35.0-51.0)  %


 


MCV     (80.0-94.0)  fl


 


MCH     (27.0-31.0)  pg


 


MCHC     (33.0-37.0)  g/dL


 


RDW     (11.5-14.5)  %


 


Plt Count     (130-400)  K/uL


 


MPV     (7.2-11.7)  fl


 


Neut % (Auto)     (50.0-75.0)  %


 


Lymph % (Auto)     (20.0-40.0)  %


 


Mono % (Auto)     (0.0-10.0)  %


 


Eos % (Auto)     (0.0-4.0)  %


 


Baso % (Auto)     (0.0-2.0)  %


 


Neut # (Auto)     (1.8-7.0)  K/uL


 


Lymph # (Auto)     (1.0-4.3)  K/uL


 


Mono # (Auto)     (0.0-0.8)  K/uL


 


Eos # (Auto)     (0.0-0.7)  K/uL


 


Baso # (Auto)     (0.0-0.2)  K/uL


 


Neutrophils % (Manual)     (42-75)  %


 


Band Neutrophils %     (0-2)  %


 


Lymphocytes % (Manual)     (20-50)  %


 


Monocytes % (Manual)     (0-10)  %


 


Myelocytes %     (0-0)  %


 


Platelet Estimate     (NORMAL)  


 


Large Platelets     


 


Giant Platelets     


 


Poikilocytosis (manual     


 


Anisocytosis (manual)     


 


Bighorn Cells     


 


ESR     (0-20)  mm/hr


 


PT     (9.8-13.1)  Seconds


 


INR     (0.9-1.2)  


 


APTT     (25.6-37.1)  Seconds


 


pO2     (30-55)  mm/Hg


 


VBG pH     (7.32-7.43)  


 


VBG pCO2     (40-60)  mmHg


 


VBG HCO3     mmol/L


 


VBG Total CO2     (22-28)  mmol/L


 


VBG O2 Sat (Calc)     (40-65)  %


 


VBG Base Excess     (0.0-2.0)  mmol/L


 


VBG Potassium     (3.6-5.2)  mmol/L


 


Sodium     (132-148)  mmol/L


 


Chloride     ()  mmol/L


 


Glucose     ()  mg/dL


 


Lactate     (0.7-2.1)  mmol/L


 


FiO2     %


 


Crit Value Called To     


 


Crit Value Called By     


 


Crit Value Read Back     


 


Blood Gas Notified Time     


 


Potassium     (3.6-5.0)  MMOL/L


 


Carbon Dioxide     (22-30)  mmol/L


 


Anion Gap     (10-20)  


 


BUN     (9-20)  mg/dl


 


Creatinine     (0.8-1.5)  mg/dl


 


Est GFR (African Amer)     


 


Est GFR (Non-Af Amer)     


 


POC Glucose (mg/dL)  244 H  274 H  326 H  ()  mg/dL


 


Random Glucose     ()  mg/dL


 


Serum Osmolality     (272-300)  mosm/kg


 


Calcium     (8.4-10.2)  mg/dL


 


Phosphorus     (2.5-4.5)  mg/dl


 


Magnesium     (1.6-2.3)  MG/DL


 


Total Bilirubin     (0.2-1.3)  mg/dl


 


AST     (17-59)  U/L


 


ALT     (21-72)  U/L


 


Alkaline Phosphatase     ()  U/L


 


Total Creatine Kinase     ()  U/L


 


Troponin I     (0.00-0.120)  ng/mL


 


Total Protein     (6.3-8.2)  G/DL


 


Albumin     (3.5-5.0)  g/dL


 


Globulin     (2.2-3.9)  gm/dL


 


Albumin/Globulin Ratio     (1.0-2.1)  


 


Procalcitonin     (0.19-0.49)  NG/ML


 


Free T4     (0.78-2.19)  ng/dL


 


TSH 3rd Generation     (0.46-4.68)  mIU/ML


 


Venous Blood Potassium     (3.6-5.2)  mmol/L


 


Urine Color     (YELLOW)  


 


Urine Clarity     (Clear)  


 


Urine pH     (5.0-8.0)  


 


Ur Specific Gravity     (1.003-1.030)  


 


Urine Protein     (NEGATIVE)  mg/dL


 


Urine Glucose (UA)     (Normal)  mg/dL


 


Urine Ketones     (NEGATIVE)  mg/dL


 


Urine Blood     (NEGATIVE)  


 


Urine Nitrate     (NEGATIVE)  


 


Urine Bilirubin     (NEGATIVE)  


 


Urine Urobilinogen     (0.2-1.0)  mg/dL


 


Ur Leukocyte Esterase     (Negative)  Veronica/uL


 


Urine RBC (Auto)     (0-3)  /hpf


 


Urine Microscopic WBC     (0-5)  /hpf


 


Ur Squamous Epith Cells     (0-5)  /hpf


 


Urine Bacteria     (<OCC)  


 


Hyaline Casts     (0-2)  /hpf


 


Random Vancomycin     ug/mL














  03/16/18 03/16/18 03/16/18 Range/Units





  22:57 21:53 21:37 


 


WBC     (4.8-10.8)  K/uL


 


RBC     (4.40-5.90)  Mil/uL


 


Hgb     (12.0-18.0)  g/dL


 


Hct     (35.0-51.0)  %


 


MCV     (80.0-94.0)  fl


 


MCH     (27.0-31.0)  pg


 


MCHC     (33.0-37.0)  g/dL


 


RDW     (11.5-14.5)  %


 


Plt Count     (130-400)  K/uL


 


MPV     (7.2-11.7)  fl


 


Neut % (Auto)     (50.0-75.0)  %


 


Lymph % (Auto)     (20.0-40.0)  %


 


Mono % (Auto)     (0.0-10.0)  %


 


Eos % (Auto)     (0.0-4.0)  %


 


Baso % (Auto)     (0.0-2.0)  %


 


Neut # (Auto)     (1.8-7.0)  K/uL


 


Lymph # (Auto)     (1.0-4.3)  K/uL


 


Mono # (Auto)     (0.0-0.8)  K/uL


 


Eos # (Auto)     (0.0-0.7)  K/uL


 


Baso # (Auto)     (0.0-0.2)  K/uL


 


Neutrophils % (Manual)     (42-75)  %


 


Band Neutrophils %     (0-2)  %


 


Lymphocytes % (Manual)     (20-50)  %


 


Monocytes % (Manual)     (0-10)  %


 


Myelocytes %     (0-0)  %


 


Platelet Estimate     (NORMAL)  


 


Large Platelets     


 


Giant Platelets     


 


Poikilocytosis (manual     


 


Anisocytosis (manual)     


 


Claude Cells     


 


ESR     (0-20)  mm/hr


 


PT     (9.8-13.1)  Seconds


 


INR     (0.9-1.2)  


 


APTT     (25.6-37.1)  Seconds


 


pO2     (30-55)  mm/Hg


 


VBG pH     (7.32-7.43)  


 


VBG pCO2     (40-60)  mmHg


 


VBG HCO3     mmol/L


 


VBG Total CO2     (22-28)  mmol/L


 


VBG O2 Sat (Calc)     (40-65)  %


 


VBG Base Excess     (0.0-2.0)  mmol/L


 


VBG Potassium     (3.6-5.2)  mmol/L


 


Sodium     (132-148)  mmol/L


 


Chloride     ()  mmol/L


 


Glucose     ()  mg/dL


 


Lactate     (0.7-2.1)  mmol/L


 


FiO2     %


 


Crit Value Called To     


 


Crit Value Called By     


 


Crit Value Read Back     


 


Blood Gas Notified Time     


 


Potassium     (3.6-5.0)  MMOL/L


 


Carbon Dioxide     (22-30)  mmol/L


 


Anion Gap     (10-20)  


 


BUN     (9-20)  mg/dl


 


Creatinine     (0.8-1.5)  mg/dl


 


Est GFR (African Amer)     


 


Est GFR (Non-Af Amer)     


 


POC Glucose (mg/dL)  330 H  332 H   ()  mg/dL


 


Random Glucose     ()  mg/dL


 


Serum Osmolality     (272-300)  mosm/kg


 


Calcium     (8.4-10.2)  mg/dL


 


Phosphorus     (2.5-4.5)  mg/dl


 


Magnesium     (1.6-2.3)  MG/DL


 


Total Bilirubin     (0.2-1.3)  mg/dl


 


AST     (17-59)  U/L


 


ALT     (21-72)  U/L


 


Alkaline Phosphatase     ()  U/L


 


Total Creatine Kinase     ()  U/L


 


Troponin I    0.8110 H*  (0.00-0.120)  ng/mL


 


Total Protein     (6.3-8.2)  G/DL


 


Albumin     (3.5-5.0)  g/dL


 


Globulin     (2.2-3.9)  gm/dL


 


Albumin/Globulin Ratio     (1.0-2.1)  


 


Procalcitonin     (0.19-0.49)  NG/ML


 


Free T4     (0.78-2.19)  ng/dL


 


TSH 3rd Generation     (0.46-4.68)  mIU/ML


 


Venous Blood Potassium     (3.6-5.2)  mmol/L


 


Urine Color     (YELLOW)  


 


Urine Clarity     (Clear)  


 


Urine pH     (5.0-8.0)  


 


Ur Specific Gravity     (1.003-1.030)  


 


Urine Protein     (NEGATIVE)  mg/dL


 


Urine Glucose (UA)     (Normal)  mg/dL


 


Urine Ketones     (NEGATIVE)  mg/dL


 


Urine Blood     (NEGATIVE)  


 


Urine Nitrate     (NEGATIVE)  


 


Urine Bilirubin     (NEGATIVE)  


 


Urine Urobilinogen     (0.2-1.0)  mg/dL


 


Ur Leukocyte Esterase     (Negative)  Veronica/uL


 


Urine RBC (Auto)     (0-3)  /hpf


 


Urine Microscopic WBC     (0-5)  /hpf


 


Ur Squamous Epith Cells     (0-5)  /hpf


 


Urine Bacteria     (<OCC)  


 


Hyaline Casts     (0-2)  /hpf


 


Random Vancomycin     ug/mL














  03/16/18 03/16/18 03/16/18 Range/Units





  21:07 20:03 18:51 


 


WBC     (4.8-10.8)  K/uL


 


RBC     (4.40-5.90)  Mil/uL


 


Hgb     (12.0-18.0)  g/dL


 


Hct     (35.0-51.0)  %


 


MCV     (80.0-94.0)  fl


 


MCH     (27.0-31.0)  pg


 


MCHC     (33.0-37.0)  g/dL


 


RDW     (11.5-14.5)  %


 


Plt Count     (130-400)  K/uL


 


MPV     (7.2-11.7)  fl


 


Neut % (Auto)     (50.0-75.0)  %


 


Lymph % (Auto)     (20.0-40.0)  %


 


Mono % (Auto)     (0.0-10.0)  %


 


Eos % (Auto)     (0.0-4.0)  %


 


Baso % (Auto)     (0.0-2.0)  %


 


Neut # (Auto)     (1.8-7.0)  K/uL


 


Lymph # (Auto)     (1.0-4.3)  K/uL


 


Mono # (Auto)     (0.0-0.8)  K/uL


 


Eos # (Auto)     (0.0-0.7)  K/uL


 


Baso # (Auto)     (0.0-0.2)  K/uL


 


Neutrophils % (Manual)     (42-75)  %


 


Band Neutrophils %     (0-2)  %


 


Lymphocytes % (Manual)     (20-50)  %


 


Monocytes % (Manual)     (0-10)  %


 


Myelocytes %     (0-0)  %


 


Platelet Estimate     (NORMAL)  


 


Large Platelets     


 


Giant Platelets     


 


Poikilocytosis (manual     


 


Anisocytosis (manual)     


 


Bighorn Cells     


 


ESR     (0-20)  mm/hr


 


PT     (9.8-13.1)  Seconds


 


INR     (0.9-1.2)  


 


APTT     (25.6-37.1)  Seconds


 


pO2     (30-55)  mm/Hg


 


VBG pH     (7.32-7.43)  


 


VBG pCO2     (40-60)  mmHg


 


VBG HCO3     mmol/L


 


VBG Total CO2     (22-28)  mmol/L


 


VBG O2 Sat (Calc)     (40-65)  %


 


VBG Base Excess     (0.0-2.0)  mmol/L


 


VBG Potassium     (3.6-5.2)  mmol/L


 


Sodium     (132-148)  mmol/L


 


Chloride     ()  mmol/L


 


Glucose     ()  mg/dL


 


Lactate     (0.7-2.1)  mmol/L


 


FiO2     %


 


Crit Value Called To     


 


Crit Value Called By     


 


Crit Value Read Back     


 


Blood Gas Notified Time     


 


Potassium     (3.6-5.0)  MMOL/L


 


Carbon Dioxide     (22-30)  mmol/L


 


Anion Gap     (10-20)  


 


BUN     (9-20)  mg/dl


 


Creatinine     (0.8-1.5)  mg/dl


 


Est GFR (African Amer)     


 


Est GFR (Non-Af Amer)     


 


POC Glucose (mg/dL)  342 H  355 H  394 H  ()  mg/dL


 


Random Glucose     ()  mg/dL


 


Serum Osmolality     (272-300)  mosm/kg


 


Calcium     (8.4-10.2)  mg/dL


 


Phosphorus     (2.5-4.5)  mg/dl


 


Magnesium     (1.6-2.3)  MG/DL


 


Total Bilirubin     (0.2-1.3)  mg/dl


 


AST     (17-59)  U/L


 


ALT     (21-72)  U/L


 


Alkaline Phosphatase     ()  U/L


 


Total Creatine Kinase     ()  U/L


 


Troponin I     (0.00-0.120)  ng/mL


 


Total Protein     (6.3-8.2)  G/DL


 


Albumin     (3.5-5.0)  g/dL


 


Globulin     (2.2-3.9)  gm/dL


 


Albumin/Globulin Ratio     (1.0-2.1)  


 


Procalcitonin     (0.19-0.49)  NG/ML


 


Free T4     (0.78-2.19)  ng/dL


 


TSH 3rd Generation     (0.46-4.68)  mIU/ML


 


Venous Blood Potassium     (3.6-5.2)  mmol/L


 


Urine Color     (YELLOW)  


 


Urine Clarity     (Clear)  


 


Urine pH     (5.0-8.0)  


 


Ur Specific Gravity     (1.003-1.030)  


 


Urine Protein     (NEGATIVE)  mg/dL


 


Urine Glucose (UA)     (Normal)  mg/dL


 


Urine Ketones     (NEGATIVE)  mg/dL


 


Urine Blood     (NEGATIVE)  


 


Urine Nitrate     (NEGATIVE)  


 


Urine Bilirubin     (NEGATIVE)  


 


Urine Urobilinogen     (0.2-1.0)  mg/dL


 


Ur Leukocyte Esterase     (Negative)  Veronica/uL


 


Urine RBC (Auto)     (0-3)  /hpf


 


Urine Microscopic WBC     (0-5)  /hpf


 


Ur Squamous Epith Cells     (0-5)  /hpf


 


Urine Bacteria     (<OCC)  


 


Hyaline Casts     (0-2)  /hpf


 


Random Vancomycin     ug/mL














  03/16/18 03/16/18 03/16/18 Range/Units





  17:58 17:21 17:18 


 


WBC     (4.8-10.8)  K/uL


 


RBC     (4.40-5.90)  Mil/uL


 


Hgb     (12.0-18.0)  g/dL


 


Hct     (35.0-51.0)  %


 


MCV     (80.0-94.0)  fl


 


MCH     (27.0-31.0)  pg


 


MCHC     (33.0-37.0)  g/dL


 


RDW     (11.5-14.5)  %


 


Plt Count     (130-400)  K/uL


 


MPV     (7.2-11.7)  fl


 


Neut % (Auto)     (50.0-75.0)  %


 


Lymph % (Auto)     (20.0-40.0)  %


 


Mono % (Auto)     (0.0-10.0)  %


 


Eos % (Auto)     (0.0-4.0)  %


 


Baso % (Auto)     (0.0-2.0)  %


 


Neut # (Auto)     (1.8-7.0)  K/uL


 


Lymph # (Auto)     (1.0-4.3)  K/uL


 


Mono # (Auto)     (0.0-0.8)  K/uL


 


Eos # (Auto)     (0.0-0.7)  K/uL


 


Baso # (Auto)     (0.0-0.2)  K/uL


 


Neutrophils % (Manual)     (42-75)  %


 


Band Neutrophils %     (0-2)  %


 


Lymphocytes % (Manual)     (20-50)  %


 


Monocytes % (Manual)     (0-10)  %


 


Myelocytes %     (0-0)  %


 


Platelet Estimate     (NORMAL)  


 


Large Platelets     


 


Giant Platelets     


 


Poikilocytosis (manual     


 


Anisocytosis (manual)     


 


Bighorn Cells     


 


ESR     (0-20)  mm/hr


 


PT     (9.8-13.1)  Seconds


 


INR     (0.9-1.2)  


 


APTT     (25.6-37.1)  Seconds


 


pO2    44  (30-55)  mm/Hg


 


VBG pH    7.14 L*  (7.32-7.43)  


 


VBG pCO2    20 L  (40-60)  mmHg


 


VBG HCO3    8.1  mmol/L


 


VBG Total CO2    7.4 L  (22-28)  mmol/L


 


VBG O2 Sat (Calc)    87.8 H  (40-65)  %


 


VBG Base Excess    -20.3 L  (0.0-2.0)  mmol/L


 


VBG Potassium    3.7  (3.6-5.2)  mmol/L


 


Sodium   154 H  148.0  (132-148)  mmol/L


 


Chloride   118 H  121.0 H  ()  mmol/L


 


Glucose    575 H* D  ()  mg/dL


 


Lactate    1.8  (0.7-2.1)  mmol/L


 


FiO2    21.0  %


 


Crit Value Called To    Lorie wang  


 


Crit Value Called By    [facility]  


 


Crit Value Read Back    Y  


 


Blood Gas Notified Time    1726  


 


Potassium   4.0   (3.6-5.0)  MMOL/L


 


Carbon Dioxide   6 L* D   (22-30)  mmol/L


 


Anion Gap   34 H   (10-20)  


 


BUN   51 H   (9-20)  mg/dl


 


Creatinine   1.5   (0.8-1.5)  mg/dl


 


Est GFR ( Amer)   57   


 


Est GFR (Non-Af Amer)   47   


 


POC Glucose (mg/dL)  454 H*    ()  mg/dL


 


Random Glucose   570 H* D   ()  mg/dL


 


Serum Osmolality     (272-300)  mosm/kg


 


Calcium   9.1   (8.4-10.2)  mg/dL


 


Phosphorus   2.2 L   (2.5-4.5)  mg/dl


 


Magnesium   2.4 H   (1.6-2.3)  MG/DL


 


Total Bilirubin     (0.2-1.3)  mg/dl


 


AST     (17-59)  U/L


 


ALT     (21-72)  U/L


 


Alkaline Phosphatase     ()  U/L


 


Total Creatine Kinase     ()  U/L


 


Troponin I     (0.00-0.120)  ng/mL


 


Total Protein     (6.3-8.2)  G/DL


 


Albumin     (3.5-5.0)  g/dL


 


Globulin     (2.2-3.9)  gm/dL


 


Albumin/Globulin Ratio     (1.0-2.1)  


 


Procalcitonin     (0.19-0.49)  NG/ML


 


Free T4     (0.78-2.19)  ng/dL


 


TSH 3rd Generation     (0.46-4.68)  mIU/ML


 


Venous Blood Potassium    3.7  (3.6-5.2)  mmol/L


 


Urine Color     (YELLOW)  


 


Urine Clarity     (Clear)  


 


Urine pH     (5.0-8.0)  


 


Ur Specific Gravity     (1.003-1.030)  


 


Urine Protein     (NEGATIVE)  mg/dL


 


Urine Glucose (UA)     (Normal)  mg/dL


 


Urine Ketones     (NEGATIVE)  mg/dL


 


Urine Blood     (NEGATIVE)  


 


Urine Nitrate     (NEGATIVE)  


 


Urine Bilirubin     (NEGATIVE)  


 


Urine Urobilinogen     (0.2-1.0)  mg/dL


 


Ur Leukocyte Esterase     (Negative)  Veronica/uL


 


Urine RBC (Auto)     (0-3)  /hpf


 


Urine Microscopic WBC     (0-5)  /hpf


 


Ur Squamous Epith Cells     (0-5)  /hpf


 


Urine Bacteria     (<OCC)  


 


Hyaline Casts     (0-2)  /hpf


 


Random Vancomycin     ug/mL














  03/16/18 03/16/18 03/16/18 Range/Units





  17:18 17:18 17:18 


 


WBC     (4.8-10.8)  K/uL


 


RBC     (4.40-5.90)  Mil/uL


 


Hgb     (12.0-18.0)  g/dL


 


Hct     (35.0-51.0)  %


 


MCV     (80.0-94.0)  fl


 


MCH     (27.0-31.0)  pg


 


MCHC     (33.0-37.0)  g/dL


 


RDW     (11.5-14.5)  %


 


Plt Count     (130-400)  K/uL


 


MPV     (7.2-11.7)  fl


 


Neut % (Auto)     (50.0-75.0)  %


 


Lymph % (Auto)     (20.0-40.0)  %


 


Mono % (Auto)     (0.0-10.0)  %


 


Eos % (Auto)     (0.0-4.0)  %


 


Baso % (Auto)     (0.0-2.0)  %


 


Neut # (Auto)     (1.8-7.0)  K/uL


 


Lymph # (Auto)     (1.0-4.3)  K/uL


 


Mono # (Auto)     (0.0-0.8)  K/uL


 


Eos # (Auto)     (0.0-0.7)  K/uL


 


Baso # (Auto)     (0.0-0.2)  K/uL


 


Neutrophils % (Manual)     (42-75)  %


 


Band Neutrophils %     (0-2)  %


 


Lymphocytes % (Manual)     (20-50)  %


 


Monocytes % (Manual)     (0-10)  %


 


Myelocytes %     (0-0)  %


 


Platelet Estimate     (NORMAL)  


 


Large Platelets     


 


Giant Platelets     


 


Poikilocytosis (manual     


 


Anisocytosis (manual)     


 


Claude Cells     


 


ESR  86 H    (0-20)  mm/hr


 


PT     (9.8-13.1)  Seconds


 


INR     (0.9-1.2)  


 


APTT     (25.6-37.1)  Seconds


 


pO2     (30-55)  mm/Hg


 


VBG pH     (7.32-7.43)  


 


VBG pCO2     (40-60)  mmHg


 


VBG HCO3     mmol/L


 


VBG Total CO2     (22-28)  mmol/L


 


VBG O2 Sat (Calc)     (40-65)  %


 


VBG Base Excess     (0.0-2.0)  mmol/L


 


VBG Potassium     (3.6-5.2)  mmol/L


 


Sodium     (132-148)  mmol/L


 


Chloride     ()  mmol/L


 


Glucose     ()  mg/dL


 


Lactate     (0.7-2.1)  mmol/L


 


FiO2     %


 


Crit Value Called To     


 


Crit Value Called By     


 


Crit Value Read Back     


 


Blood Gas Notified Time     


 


Potassium     (3.6-5.0)  MMOL/L


 


Carbon Dioxide     (22-30)  mmol/L


 


Anion Gap     (10-20)  


 


BUN     (9-20)  mg/dl


 


Creatinine     (0.8-1.5)  mg/dl


 


Est GFR (African Amer)     


 


Est GFR (Non-Af Amer)     


 


POC Glucose (mg/dL)     ()  mg/dL


 


Random Glucose     ()  mg/dL


 


Serum Osmolality     (272-300)  mosm/kg


 


Calcium     (8.4-10.2)  mg/dL


 


Phosphorus     (2.5-4.5)  mg/dl


 


Magnesium     (1.6-2.3)  MG/DL


 


Total Bilirubin     (0.2-1.3)  mg/dl


 


AST     (17-59)  U/L


 


ALT     (21-72)  U/L


 


Alkaline Phosphatase     ()  U/L


 


Total Creatine Kinase   143   ()  U/L


 


Troponin I     (0.00-0.120)  ng/mL


 


Total Protein     (6.3-8.2)  G/DL


 


Albumin     (3.5-5.0)  g/dL


 


Globulin     (2.2-3.9)  gm/dL


 


Albumin/Globulin Ratio     (1.0-2.1)  


 


Procalcitonin    0.22  (0.19-0.49)  NG/ML


 


Free T4     (0.78-2.19)  ng/dL


 


TSH 3rd Generation   0.60   (0.46-4.68)  mIU/ML


 


Venous Blood Potassium     (3.6-5.2)  mmol/L


 


Urine Color     (YELLOW)  


 


Urine Clarity     (Clear)  


 


Urine pH     (5.0-8.0)  


 


Ur Specific Gravity     (1.003-1.030)  


 


Urine Protein     (NEGATIVE)  mg/dL


 


Urine Glucose (UA)     (Normal)  mg/dL


 


Urine Ketones     (NEGATIVE)  mg/dL


 


Urine Blood     (NEGATIVE)  


 


Urine Nitrate     (NEGATIVE)  


 


Urine Bilirubin     (NEGATIVE)  


 


Urine Urobilinogen     (0.2-1.0)  mg/dL


 


Ur Leukocyte Esterase     (Negative)  Veronica/uL


 


Urine RBC (Auto)     (0-3)  /hpf


 


Urine Microscopic WBC     (0-5)  /hpf


 


Ur Squamous Epith Cells     (0-5)  /hpf


 


Urine Bacteria     (<OCC)  


 


Hyaline Casts     (0-2)  /hpf


 


Random Vancomycin     ug/mL














  03/16/18 03/16/18 03/16/18 Range/Units





  17:18 16:56 15:56 


 


WBC     (4.8-10.8)  K/uL


 


RBC     (4.40-5.90)  Mil/uL


 


Hgb     (12.0-18.0)  g/dL


 


Hct     (35.0-51.0)  %


 


MCV     (80.0-94.0)  fl


 


MCH     (27.0-31.0)  pg


 


MCHC     (33.0-37.0)  g/dL


 


RDW     (11.5-14.5)  %


 


Plt Count     (130-400)  K/uL


 


MPV     (7.2-11.7)  fl


 


Neut % (Auto)     (50.0-75.0)  %


 


Lymph % (Auto)     (20.0-40.0)  %


 


Mono % (Auto)     (0.0-10.0)  %


 


Eos % (Auto)     (0.0-4.0)  %


 


Baso % (Auto)     (0.0-2.0)  %


 


Neut # (Auto)     (1.8-7.0)  K/uL


 


Lymph # (Auto)     (1.0-4.3)  K/uL


 


Mono # (Auto)     (0.0-0.8)  K/uL


 


Eos # (Auto)     (0.0-0.7)  K/uL


 


Baso # (Auto)     (0.0-0.2)  K/uL


 


Neutrophils % (Manual)     (42-75)  %


 


Band Neutrophils %     (0-2)  %


 


Lymphocytes % (Manual)     (20-50)  %


 


Monocytes % (Manual)     (0-10)  %


 


Myelocytes %     (0-0)  %


 


Platelet Estimate     (NORMAL)  


 


Large Platelets     


 


Giant Platelets     


 


Poikilocytosis (manual     


 


Anisocytosis (manual)     


 


Bighorn Cells     


 


ESR     (0-20)  mm/hr


 


PT     (9.8-13.1)  Seconds


 


INR     (0.9-1.2)  


 


APTT     (25.6-37.1)  Seconds


 


pO2     (30-55)  mm/Hg


 


VBG pH     (7.32-7.43)  


 


VBG pCO2     (40-60)  mmHg


 


VBG HCO3     mmol/L


 


VBG Total CO2     (22-28)  mmol/L


 


VBG O2 Sat (Calc)     (40-65)  %


 


VBG Base Excess     (0.0-2.0)  mmol/L


 


VBG Potassium     (3.6-5.2)  mmol/L


 


Sodium     (132-148)  mmol/L


 


Chloride     ()  mmol/L


 


Glucose     ()  mg/dL


 


Lactate     (0.7-2.1)  mmol/L


 


FiO2     %


 


Crit Value Called To     


 


Crit Value Called By     


 


Crit Value Read Back     


 


Blood Gas Notified Time     


 


Potassium     (3.6-5.0)  MMOL/L


 


Carbon Dioxide     (22-30)  mmol/L


 


Anion Gap     (10-20)  


 


BUN     (9-20)  mg/dl


 


Creatinine     (0.8-1.5)  mg/dl


 


Est GFR (African Amer)     


 


Est GFR (Non-Af Amer)     


 


POC Glucose (mg/dL)   > 500 H*  > 500 H*  ()  mg/dL


 


Random Glucose     ()  mg/dL


 


Serum Osmolality  365 H    (272-300)  mosm/kg


 


Calcium     (8.4-10.2)  mg/dL


 


Phosphorus     (2.5-4.5)  mg/dl


 


Magnesium     (1.6-2.3)  MG/DL


 


Total Bilirubin     (0.2-1.3)  mg/dl


 


AST     (17-59)  U/L


 


ALT     (21-72)  U/L


 


Alkaline Phosphatase     ()  U/L


 


Total Creatine Kinase     ()  U/L


 


Troponin I     (0.00-0.120)  ng/mL


 


Total Protein     (6.3-8.2)  G/DL


 


Albumin     (3.5-5.0)  g/dL


 


Globulin     (2.2-3.9)  gm/dL


 


Albumin/Globulin Ratio     (1.0-2.1)  


 


Procalcitonin     (0.19-0.49)  NG/ML


 


Free T4  1.24    (0.78-2.19)  ng/dL


 


TSH 3rd Generation     (0.46-4.68)  mIU/ML


 


Venous Blood Potassium     (3.6-5.2)  mmol/L


 


Urine Color     (YELLOW)  


 


Urine Clarity     (Clear)  


 


Urine pH     (5.0-8.0)  


 


Ur Specific Gravity     (1.003-1.030)  


 


Urine Protein     (NEGATIVE)  mg/dL


 


Urine Glucose (UA)     (Normal)  mg/dL


 


Urine Ketones     (NEGATIVE)  mg/dL


 


Urine Blood     (NEGATIVE)  


 


Urine Nitrate     (NEGATIVE)  


 


Urine Bilirubin     (NEGATIVE)  


 


Urine Urobilinogen     (0.2-1.0)  mg/dL


 


Ur Leukocyte Esterase     (Negative)  Veronica/uL


 


Urine RBC (Auto)     (0-3)  /hpf


 


Urine Microscopic WBC     (0-5)  /hpf


 


Ur Squamous Epith Cells     (0-5)  /hpf


 


Urine Bacteria     (<OCC)  


 


Hyaline Casts     (0-2)  /hpf


 


Random Vancomycin     ug/mL














  03/16/18 03/16/18 03/16/18 Range/Units





  13:23 12:11 11:59 


 


WBC     (4.8-10.8)  K/uL


 


RBC     (4.40-5.90)  Mil/uL


 


Hgb     (12.0-18.0)  g/dL


 


Hct     (35.0-51.0)  %


 


MCV     (80.0-94.0)  fl


 


MCH     (27.0-31.0)  pg


 


MCHC     (33.0-37.0)  g/dL


 


RDW     (11.5-14.5)  %


 


Plt Count     (130-400)  K/uL


 


MPV     (7.2-11.7)  fl


 


Neut % (Auto)     (50.0-75.0)  %


 


Lymph % (Auto)     (20.0-40.0)  %


 


Mono % (Auto)     (0.0-10.0)  %


 


Eos % (Auto)     (0.0-4.0)  %


 


Baso % (Auto)     (0.0-2.0)  %


 


Neut # (Auto)     (1.8-7.0)  K/uL


 


Lymph # (Auto)     (1.0-4.3)  K/uL


 


Mono # (Auto)     (0.0-0.8)  K/uL


 


Eos # (Auto)     (0.0-0.7)  K/uL


 


Baso # (Auto)     (0.0-0.2)  K/uL


 


Neutrophils % (Manual)     (42-75)  %


 


Band Neutrophils %     (0-2)  %


 


Lymphocytes % (Manual)     (20-50)  %


 


Monocytes % (Manual)     (0-10)  %


 


Myelocytes %     (0-0)  %


 


Platelet Estimate     (NORMAL)  


 


Large Platelets     


 


Giant Platelets     


 


Poikilocytosis (manual     


 


Anisocytosis (manual)     


 


Claude Cells     


 


ESR     (0-20)  mm/hr


 


PT     (9.8-13.1)  Seconds


 


INR     (0.9-1.2)  


 


APTT     (25.6-37.1)  Seconds


 


pO2     (30-55)  mm/Hg


 


VBG pH     (7.32-7.43)  


 


VBG pCO2     (40-60)  mmHg


 


VBG HCO3     mmol/L


 


VBG Total CO2     (22-28)  mmol/L


 


VBG O2 Sat (Calc)     (40-65)  %


 


VBG Base Excess     (0.0-2.0)  mmol/L


 


VBG Potassium     (3.6-5.2)  mmol/L


 


Sodium     (132-148)  mmol/L


 


Chloride     ()  mmol/L


 


Glucose     ()  mg/dL


 


Lactate     (0.7-2.1)  mmol/L


 


FiO2     %


 


Crit Value Called To     


 


Crit Value Called By     


 


Crit Value Read Back     


 


Blood Gas Notified Time     


 


Potassium     (3.6-5.0)  MMOL/L


 


Carbon Dioxide     (22-30)  mmol/L


 


Anion Gap     (10-20)  


 


BUN     (9-20)  mg/dl


 


Creatinine     (0.8-1.5)  mg/dl


 


Est GFR (African Amer)     


 


Est GFR (Non-Af Amer)     


 


POC Glucose (mg/dL)  > 500 H*   > 500 H*  ()  mg/dL


 


Random Glucose     ()  mg/dL


 


Serum Osmolality     (272-300)  mosm/kg


 


Calcium     (8.4-10.2)  mg/dL


 


Phosphorus     (2.5-4.5)  mg/dl


 


Magnesium     (1.6-2.3)  MG/DL


 


Total Bilirubin     (0.2-1.3)  mg/dl


 


AST     (17-59)  U/L


 


ALT     (21-72)  U/L


 


Alkaline Phosphatase     ()  U/L


 


Total Creatine Kinase     ()  U/L


 


Troponin I     (0.00-0.120)  ng/mL


 


Total Protein     (6.3-8.2)  G/DL


 


Albumin     (3.5-5.0)  g/dL


 


Globulin     (2.2-3.9)  gm/dL


 


Albumin/Globulin Ratio     (1.0-2.1)  


 


Procalcitonin     (0.19-0.49)  NG/ML


 


Free T4     (0.78-2.19)  ng/dL


 


TSH 3rd Generation     (0.46-4.68)  mIU/ML


 


Venous Blood Potassium     (3.6-5.2)  mmol/L


 


Urine Color   Yellow   (YELLOW)  


 


Urine Clarity   Slighty-cloudy   (Clear)  


 


Urine pH   5.0   (5.0-8.0)  


 


Ur Specific Gravity   1.016   (1.003-1.030)  


 


Urine Protein   100   (NEGATIVE)  mg/dL


 


Urine Glucose (UA)   >=500   (Normal)  mg/dL


 


Urine Ketones   80   (NEGATIVE)  mg/dL


 


Urine Blood   Moderate   (NEGATIVE)  


 


Urine Nitrate   Negative   (NEGATIVE)  


 


Urine Bilirubin   Negative   (NEGATIVE)  


 


Urine Urobilinogen   0.2-1.0   (0.2-1.0)  mg/dL


 


Ur Leukocyte Esterase   Neg   (Negative)  Veronica/uL


 


Urine RBC (Auto)   6 H   (0-3)  /hpf


 


Urine Microscopic WBC   1   (0-5)  /hpf


 


Ur Squamous Epith Cells   < 1   (0-5)  /hpf


 


Urine Bacteria   Rare   (<OCC)  


 


Hyaline Casts   6-10 H   (0-2)  /hpf


 


Random Vancomycin     ug/mL














  03/16/18 03/16/18 03/16/18 Range/Units





  10:26 10:26 10:26 


 


WBC   25.6 H   (4.8-10.8)  K/uL


 


RBC   4.78   (4.40-5.90)  Mil/uL


 


Hgb   12.7   (12.0-18.0)  g/dL


 


Hct   42.6   (35.0-51.0)  %


 


MCV   89.1   (80.0-94.0)  fl


 


MCH   26.7 L   (27.0-31.0)  pg


 


MCHC   29.9 L   (33.0-37.0)  g/dL


 


RDW   18.3 H   (11.5-14.5)  %


 


Plt Count   481 H   (130-400)  K/uL


 


MPV   12.0 H   (7.2-11.7)  fl


 


Neut % (Auto)   87.5 H   (50.0-75.0)  %


 


Lymph % (Auto)   5.1 L   (20.0-40.0)  %


 


Mono % (Auto)   6.3   (0.0-10.0)  %


 


Eos % (Auto)   0.1   (0.0-4.0)  %


 


Baso % (Auto)   1.0   (0.0-2.0)  %


 


Neut # (Auto)   22.4 H   (1.8-7.0)  K/uL


 


Lymph # (Auto)   1.3   (1.0-4.3)  K/uL


 


Mono # (Auto)   1.6 H   (0.0-0.8)  K/uL


 


Eos # (Auto)   0.0   (0.0-0.7)  K/uL


 


Baso # (Auto)   0.3 H   (0.0-0.2)  K/uL


 


Neutrophils % (Manual)   80 H   (42-75)  %


 


Band Neutrophils %   5 H   (0-2)  %


 


Lymphocytes % (Manual)   7 L   (20-50)  %


 


Monocytes % (Manual)   6   (0-10)  %


 


Myelocytes %   2 H   (0-0)  %


 


Platelet Estimate   Normal   (NORMAL)  


 


Large Platelets   Present   


 


Giant Platelets   Present   


 


Poikilocytosis (manual   Slight   


 


Anisocytosis (manual)   Slight   


 


Bighorn Cells   Slight   


 


ESR     (0-20)  mm/hr


 


PT  13.6 H    (9.8-13.1)  Seconds


 


INR  1.2    (0.9-1.2)  


 


APTT  32.5    (25.6-37.1)  Seconds


 


pO2     (30-55)  mm/Hg


 


VBG pH     (7.32-7.43)  


 


VBG pCO2     (40-60)  mmHg


 


VBG HCO3     mmol/L


 


VBG Total CO2     (22-28)  mmol/L


 


VBG O2 Sat (Calc)     (40-65)  %


 


VBG Base Excess     (0.0-2.0)  mmol/L


 


VBG Potassium     (3.6-5.2)  mmol/L


 


Sodium    147  (132-148)  mmol/L


 


Chloride    102  ()  mmol/L


 


Glucose     ()  mg/dL


 


Lactate     (0.7-2.1)  mmol/L


 


FiO2     %


 


Crit Value Called To     


 


Crit Value Called By     


 


Crit Value Read Back     


 


Blood Gas Notified Time     


 


Potassium    5.7 H  (3.6-5.0)  MMOL/L


 


Carbon Dioxide    < 5 L*  (22-30)  mmol/L


 


Anion Gap    46 H  (10-20)  


 


BUN    54 H  (9-20)  mg/dl


 


Creatinine    2.2 H  (0.8-1.5)  mg/dl


 


Est GFR (African Amer)    37  


 


Est GFR (Non-Af Amer)    30  


 


POC Glucose (mg/dL)     ()  mg/dL


 


Random Glucose    961 H*  ()  mg/dL


 


Serum Osmolality     (272-300)  mosm/kg


 


Calcium    10.7 H  (8.4-10.2)  mg/dL


 


Phosphorus     (2.5-4.5)  mg/dl


 


Magnesium    2.9 H  (1.6-2.3)  MG/DL


 


Total Bilirubin    0.4  (0.2-1.3)  mg/dl


 


AST    22  (17-59)  U/L


 


ALT    6 L  (21-72)  U/L


 


Alkaline Phosphatase    137 H  ()  U/L


 


Total Creatine Kinase     ()  U/L


 


Troponin I     (0.00-0.120)  ng/mL


 


Total Protein    8.8 H  (6.3-8.2)  G/DL


 


Albumin    3.9  (3.5-5.0)  g/dL


 


Globulin    4.9 H  (2.2-3.9)  gm/dL


 


Albumin/Globulin Ratio    0.8 L  (1.0-2.1)  


 


Procalcitonin     (0.19-0.49)  NG/ML


 


Free T4     (0.78-2.19)  ng/dL


 


TSH 3rd Generation     (0.46-4.68)  mIU/ML


 


Venous Blood Potassium     (3.6-5.2)  mmol/L


 


Urine Color     (YELLOW)  


 


Urine Clarity     (Clear)  


 


Urine pH     (5.0-8.0)  


 


Ur Specific Gravity     (1.003-1.030)  


 


Urine Protein     (NEGATIVE)  mg/dL


 


Urine Glucose (UA)     (Normal)  mg/dL


 


Urine Ketones     (NEGATIVE)  mg/dL


 


Urine Blood     (NEGATIVE)  


 


Urine Nitrate     (NEGATIVE)  


 


Urine Bilirubin     (NEGATIVE)  


 


Urine Urobilinogen     (0.2-1.0)  mg/dL


 


Ur Leukocyte Esterase     (Negative)  Veronica/uL


 


Urine RBC (Auto)     (0-3)  /hpf


 


Urine Microscopic WBC     (0-5)  /hpf


 


Ur Squamous Epith Cells     (0-5)  /hpf


 


Urine Bacteria     (<OCC)  


 


Hyaline Casts     (0-2)  /hpf


 


Random Vancomycin     ug/mL














  03/16/18 Range/Units





  10:20 


 


WBC   (4.8-10.8)  K/uL


 


RBC   (4.40-5.90)  Mil/uL


 


Hgb   (12.0-18.0)  g/dL


 


Hct   (35.0-51.0)  %


 


MCV   (80.0-94.0)  fl


 


MCH   (27.0-31.0)  pg


 


MCHC   (33.0-37.0)  g/dL


 


RDW   (11.5-14.5)  %


 


Plt Count   (130-400)  K/uL


 


MPV   (7.2-11.7)  fl


 


Neut % (Auto)   (50.0-75.0)  %


 


Lymph % (Auto)   (20.0-40.0)  %


 


Mono % (Auto)   (0.0-10.0)  %


 


Eos % (Auto)   (0.0-4.0)  %


 


Baso % (Auto)   (0.0-2.0)  %


 


Neut # (Auto)   (1.8-7.0)  K/uL


 


Lymph # (Auto)   (1.0-4.3)  K/uL


 


Mono # (Auto)   (0.0-0.8)  K/uL


 


Eos # (Auto)   (0.0-0.7)  K/uL


 


Baso # (Auto)   (0.0-0.2)  K/uL


 


Neutrophils % (Manual)   (42-75)  %


 


Band Neutrophils %   (0-2)  %


 


Lymphocytes % (Manual)   (20-50)  %


 


Monocytes % (Manual)   (0-10)  %


 


Myelocytes %   (0-0)  %


 


Platelet Estimate   (NORMAL)  


 


Large Platelets   


 


Giant Platelets   


 


Poikilocytosis (manual   


 


Anisocytosis (manual)   


 


Claude Cells   


 


ESR   (0-20)  mm/hr


 


PT   (9.8-13.1)  Seconds


 


INR   (0.9-1.2)  


 


APTT   (25.6-37.1)  Seconds


 


pO2  43  (30-55)  mm/Hg


 


VBG pH  6.81 L*  (7.32-7.43)  


 


VBG pCO2  20 L  (40-60)  mmHg


 


VBG HCO3  -0.6  mmol/L


 


VBG Total CO2  3.8 L  (22-28)  mmol/L


 


VBG O2 Sat (Calc)  74.6 H  (40-65)  %


 


VBG Base Excess  -30.5 L  (0.0-2.0)  mmol/L


 


VBG Potassium  6.3 H*  (3.6-5.2)  mmol/L


 


Sodium  141.0  (132-148)  mmol/L


 


Chloride  97.0 L  ()  mmol/L


 


Glucose  > 750 H*  ()  mg/dL


 


Lactate  4.6 H*  (0.7-2.1)  mmol/L


 


FiO2  21.0  %


 


Crit Value Called To  [facility]  


 


Crit Value Called By  23  


 


Crit Value Read Back  Y  


 


Blood Gas Notified Time  1040  


 


Potassium   (3.6-5.0)  MMOL/L


 


Carbon Dioxide   (22-30)  mmol/L


 


Anion Gap   (10-20)  


 


BUN   (9-20)  mg/dl


 


Creatinine   (0.8-1.5)  mg/dl


 


Est GFR (African Amer)   


 


Est GFR (Non-Af Amer)   


 


POC Glucose (mg/dL)   ()  mg/dL


 


Random Glucose   ()  mg/dL


 


Serum Osmolality   (272-300)  mosm/kg


 


Calcium   (8.4-10.2)  mg/dL


 


Phosphorus   (2.5-4.5)  mg/dl


 


Magnesium   (1.6-2.3)  MG/DL


 


Total Bilirubin   (0.2-1.3)  mg/dl


 


AST   (17-59)  U/L


 


ALT   (21-72)  U/L


 


Alkaline Phosphatase   ()  U/L


 


Total Creatine Kinase   ()  U/L


 


Troponin I   (0.00-0.120)  ng/mL


 


Total Protein   (6.3-8.2)  G/DL


 


Albumin   (3.5-5.0)  g/dL


 


Globulin   (2.2-3.9)  gm/dL


 


Albumin/Globulin Ratio   (1.0-2.1)  


 


Procalcitonin   (0.19-0.49)  NG/ML


 


Free T4   (0.78-2.19)  ng/dL


 


TSH 3rd Generation   (0.46-4.68)  mIU/ML


 


Venous Blood Potassium  6.3 H*  (3.6-5.2)  mmol/L


 


Urine Color   (YELLOW)  


 


Urine Clarity   (Clear)  


 


Urine pH   (5.0-8.0)  


 


Ur Specific Gravity   (1.003-1.030)  


 


Urine Protein   (NEGATIVE)  mg/dL


 


Urine Glucose (UA)   (Normal)  mg/dL


 


Urine Ketones   (NEGATIVE)  mg/dL


 


Urine Blood   (NEGATIVE)  


 


Urine Nitrate   (NEGATIVE)  


 


Urine Bilirubin   (NEGATIVE)  


 


Urine Urobilinogen   (0.2-1.0)  mg/dL


 


Ur Leukocyte Esterase   (Negative)  Veronica/uL


 


Urine RBC (Auto)   (0-3)  /hpf


 


Urine Microscopic WBC   (0-5)  /hpf


 


Ur Squamous Epith Cells   (0-5)  /hpf


 


Urine Bacteria   (<OCC)  


 


Hyaline Casts   (0-2)  /hpf


 


Random Vancomycin   ug/mL








 Laboratory Results - last 24 hr











  03/16/18 03/16/18 03/16/18





  10:20 10:26 10:26


 


WBC    25.6 H


 


RBC    4.78


 


Hgb    12.7


 


Hct    42.6


 


MCV    89.1


 


MCH    26.7 L


 


MCHC    29.9 L


 


RDW    18.3 H


 


Plt Count    481 H


 


MPV    12.0 H


 


Neut % (Auto)    87.5 H


 


Lymph % (Auto)    5.1 L


 


Mono % (Auto)    6.3


 


Eos % (Auto)    0.1


 


Baso % (Auto)    1.0


 


Neut # (Auto)    22.4 H


 


Lymph # (Auto)    1.3


 


Mono # (Auto)    1.6 H


 


Eos # (Auto)    0.0


 


Baso # (Auto)    0.3 H


 


Neutrophils % (Manual)    80 H


 


Band Neutrophils %    5 H


 


Lymphocytes % (Manual)    7 L


 


Monocytes % (Manual)    6


 


Myelocytes %    2 H


 


Platelet Estimate    Normal


 


Large Platelets    Present


 


Giant Platelets    Present


 


Poikilocytosis (manual    Slight


 


Anisocytosis (manual)    Slight


 


Claude Cells    Slight


 


ESR   


 


PT   


 


INR   


 


APTT   


 


pO2  43  


 


VBG pH  6.81 L*  


 


VBG pCO2  20 L  


 


VBG HCO3  -0.6  


 


VBG Total CO2  3.8 L  


 


VBG O2 Sat (Calc)  74.6 H  


 


VBG Base Excess  -30.5 L  


 


VBG Potassium  6.3 H*  


 


Sodium  141.0  147 


 


Chloride  97.0 L  102 


 


Glucose  > 750 H*  


 


Lactate  4.6 H*  


 


FiO2  21.0  


 


Crit Value Called To  [facility]  


 


Crit Value Called By  23  


 


Crit Value Read Back  Y  


 


Blood Gas Notified Time  1040  


 


Potassium   5.7 H 


 


Carbon Dioxide   < 5 L* 


 


Anion Gap   46 H 


 


BUN   54 H 


 


Creatinine   2.2 H 


 


Est GFR ( Amer)   37 


 


Est GFR (Non-Af Amer)   30 


 


POC Glucose (mg/dL)   


 


Random Glucose   961 H* 


 


Serum Osmolality   


 


Calcium   10.7 H 


 


Phosphorus   


 


Magnesium   2.9 H 


 


Total Bilirubin   0.4 


 


AST   22 


 


ALT   6 L 


 


Alkaline Phosphatase   137 H 


 


Total Creatine Kinase   


 


Troponin I   


 


Total Protein   8.8 H 


 


Albumin   3.9 


 


Globulin   4.9 H 


 


Albumin/Globulin Ratio   0.8 L 


 


Procalcitonin   


 


Free T4   


 


TSH 3rd Generation   


 


Venous Blood Potassium  6.3 H*  


 


Urine Color   


 


Urine Clarity   


 


Urine pH   


 


Ur Specific Gravity   


 


Urine Protein   


 


Urine Glucose (UA)   


 


Urine Ketones   


 


Urine Blood   


 


Urine Nitrate   


 


Urine Bilirubin   


 


Urine Urobilinogen   


 


Ur Leukocyte Esterase   


 


Urine RBC (Auto)   


 


Urine Microscopic WBC   


 


Ur Squamous Epith Cells   


 


Urine Bacteria   


 


Hyaline Casts   


 


Random Vancomycin   














  03/16/18 03/16/18 03/16/18





  10:26 11:59 12:11


 


WBC   


 


RBC   


 


Hgb   


 


Hct   


 


MCV   


 


MCH   


 


MCHC   


 


RDW   


 


Plt Count   


 


MPV   


 


Neut % (Auto)   


 


Lymph % (Auto)   


 


Mono % (Auto)   


 


Eos % (Auto)   


 


Baso % (Auto)   


 


Neut # (Auto)   


 


Lymph # (Auto)   


 


Mono # (Auto)   


 


Eos # (Auto)   


 


Baso # (Auto)   


 


Neutrophils % (Manual)   


 


Band Neutrophils %   


 


Lymphocytes % (Manual)   


 


Monocytes % (Manual)   


 


Myelocytes %   


 


Platelet Estimate   


 


Large Platelets   


 


Giant Platelets   


 


Poikilocytosis (manual   


 


Anisocytosis (manual)   


 


Claude Cells   


 


ESR   


 


PT  13.6 H  


 


INR  1.2  


 


APTT  32.5  


 


pO2   


 


VBG pH   


 


VBG pCO2   


 


VBG HCO3   


 


VBG Total CO2   


 


VBG O2 Sat (Calc)   


 


VBG Base Excess   


 


VBG Potassium   


 


Sodium   


 


Chloride   


 


Glucose   


 


Lactate   


 


FiO2   


 


Crit Value Called To   


 


Crit Value Called By   


 


Crit Value Read Back   


 


Blood Gas Notified Time   


 


Potassium   


 


Carbon Dioxide   


 


Anion Gap   


 


BUN   


 


Creatinine   


 


Est GFR ( Amer)   


 


Est GFR (Non-Af Amer)   


 


POC Glucose (mg/dL)   > 500 H* 


 


Random Glucose   


 


Serum Osmolality   


 


Calcium   


 


Phosphorus   


 


Magnesium   


 


Total Bilirubin   


 


AST   


 


ALT   


 


Alkaline Phosphatase   


 


Total Creatine Kinase   


 


Troponin I   


 


Total Protein   


 


Albumin   


 


Globulin   


 


Albumin/Globulin Ratio   


 


Procalcitonin   


 


Free T4   


 


TSH 3rd Generation   


 


Venous Blood Potassium   


 


Urine Color    Yellow


 


Urine Clarity    Slighty-cloudy


 


Urine pH    5.0


 


Ur Specific Gravity    1.016


 


Urine Protein    100


 


Urine Glucose (UA)    >=500


 


Urine Ketones    80


 


Urine Blood    Moderate


 


Urine Nitrate    Negative


 


Urine Bilirubin    Negative


 


Urine Urobilinogen    0.2-1.0


 


Ur Leukocyte Esterase    Neg


 


Urine RBC (Auto)    6 H


 


Urine Microscopic WBC    1


 


Ur Squamous Epith Cells    < 1


 


Urine Bacteria    Rare


 


Hyaline Casts    6-10 H


 


Random Vancomycin   














  03/16/18 03/16/18 03/16/18





  13:23 15:56 16:56


 


WBC   


 


RBC   


 


Hgb   


 


Hct   


 


MCV   


 


MCH   


 


MCHC   


 


RDW   


 


Plt Count   


 


MPV   


 


Neut % (Auto)   


 


Lymph % (Auto)   


 


Mono % (Auto)   


 


Eos % (Auto)   


 


Baso % (Auto)   


 


Neut # (Auto)   


 


Lymph # (Auto)   


 


Mono # (Auto)   


 


Eos # (Auto)   


 


Baso # (Auto)   


 


Neutrophils % (Manual)   


 


Band Neutrophils %   


 


Lymphocytes % (Manual)   


 


Monocytes % (Manual)   


 


Myelocytes %   


 


Platelet Estimate   


 


Large Platelets   


 


Giant Platelets   


 


Poikilocytosis (manual   


 


Anisocytosis (manual)   


 


Bighorn Cells   


 


ESR   


 


PT   


 


INR   


 


APTT   


 


pO2   


 


VBG pH   


 


VBG pCO2   


 


VBG HCO3   


 


VBG Total CO2   


 


VBG O2 Sat (Calc)   


 


VBG Base Excess   


 


VBG Potassium   


 


Sodium   


 


Chloride   


 


Glucose   


 


Lactate   


 


FiO2   


 


Crit Value Called To   


 


Crit Value Called By   


 


Crit Value Read Back   


 


Blood Gas Notified Time   


 


Potassium   


 


Carbon Dioxide   


 


Anion Gap   


 


BUN   


 


Creatinine   


 


Est GFR ( Amer)   


 


Est GFR (Non-Af Amer)   


 


POC Glucose (mg/dL)  > 500 H*  > 500 H*  > 500 H*


 


Random Glucose   


 


Serum Osmolality   


 


Calcium   


 


Phosphorus   


 


Magnesium   


 


Total Bilirubin   


 


AST   


 


ALT   


 


Alkaline Phosphatase   


 


Total Creatine Kinase   


 


Troponin I   


 


Total Protein   


 


Albumin   


 


Globulin   


 


Albumin/Globulin Ratio   


 


Procalcitonin   


 


Free T4   


 


TSH 3rd Generation   


 


Venous Blood Potassium   


 


Urine Color   


 


Urine Clarity   


 


Urine pH   


 


Ur Specific Gravity   


 


Urine Protein   


 


Urine Glucose (UA)   


 


Urine Ketones   


 


Urine Blood   


 


Urine Nitrate   


 


Urine Bilirubin   


 


Urine Urobilinogen   


 


Ur Leukocyte Esterase   


 


Urine RBC (Auto)   


 


Urine Microscopic WBC   


 


Ur Squamous Epith Cells   


 


Urine Bacteria   


 


Hyaline Casts   


 


Random Vancomycin   














  03/16/18 03/16/18 03/16/18





  17:18 17:18 17:18


 


WBC   


 


RBC   


 


Hgb   


 


Hct   


 


MCV   


 


MCH   


 


MCHC   


 


RDW   


 


Plt Count   


 


MPV   


 


Neut % (Auto)   


 


Lymph % (Auto)   


 


Mono % (Auto)   


 


Eos % (Auto)   


 


Baso % (Auto)   


 


Neut # (Auto)   


 


Lymph # (Auto)   


 


Mono # (Auto)   


 


Eos # (Auto)   


 


Baso # (Auto)   


 


Neutrophils % (Manual)   


 


Band Neutrophils %   


 


Lymphocytes % (Manual)   


 


Monocytes % (Manual)   


 


Myelocytes %   


 


Platelet Estimate   


 


Large Platelets   


 


Giant Platelets   


 


Poikilocytosis (manual   


 


Anisocytosis (manual)   


 


Claude Cells   


 


ESR   


 


PT   


 


INR   


 


APTT   


 


pO2   


 


VBG pH   


 


VBG pCO2   


 


VBG HCO3   


 


VBG Total CO2   


 


VBG O2 Sat (Calc)   


 


VBG Base Excess   


 


VBG Potassium   


 


Sodium   


 


Chloride   


 


Glucose   


 


Lactate   


 


FiO2   


 


Crit Value Called To   


 


Crit Value Called By   


 


Crit Value Read Back   


 


Blood Gas Notified Time   


 


Potassium   


 


Carbon Dioxide   


 


Anion Gap   


 


BUN   


 


Creatinine   


 


Est GFR ( Amer)   


 


Est GFR (Non-Af Amer)   


 


POC Glucose (mg/dL)   


 


Random Glucose   


 


Serum Osmolality  365 H  


 


Calcium   


 


Phosphorus   


 


Magnesium   


 


Total Bilirubin   


 


AST   


 


ALT   


 


Alkaline Phosphatase   


 


Total Creatine Kinase    143


 


Troponin I   


 


Total Protein   


 


Albumin   


 


Globulin   


 


Albumin/Globulin Ratio   


 


Procalcitonin   0.22 


 


Free T4  1.24  


 


TSH 3rd Generation    0.60


 


Venous Blood Potassium   


 


Urine Color   


 


Urine Clarity   


 


Urine pH   


 


Ur Specific Gravity   


 


Urine Protein   


 


Urine Glucose (UA)   


 


Urine Ketones   


 


Urine Blood   


 


Urine Nitrate   


 


Urine Bilirubin   


 


Urine Urobilinogen   


 


Ur Leukocyte Esterase   


 


Urine RBC (Auto)   


 


Urine Microscopic WBC   


 


Ur Squamous Epith Cells   


 


Urine Bacteria   


 


Hyaline Casts   


 


Random Vancomycin   














  03/16/18 03/16/18 03/16/18





  17:18 17:18 17:21


 


WBC   


 


RBC   


 


Hgb   


 


Hct   


 


MCV   


 


MCH   


 


MCHC   


 


RDW   


 


Plt Count   


 


MPV   


 


Neut % (Auto)   


 


Lymph % (Auto)   


 


Mono % (Auto)   


 


Eos % (Auto)   


 


Baso % (Auto)   


 


Neut # (Auto)   


 


Lymph # (Auto)   


 


Mono # (Auto)   


 


Eos # (Auto)   


 


Baso # (Auto)   


 


Neutrophils % (Manual)   


 


Band Neutrophils %   


 


Lymphocytes % (Manual)   


 


Monocytes % (Manual)   


 


Myelocytes %   


 


Platelet Estimate   


 


Large Platelets   


 


Giant Platelets   


 


Poikilocytosis (manual   


 


Anisocytosis (manual)   


 


Bighorn Cells   


 


ESR  86 H  


 


PT   


 


INR   


 


APTT   


 


pO2   44 


 


VBG pH   7.14 L* 


 


VBG pCO2   20 L 


 


VBG HCO3   8.1 


 


VBG Total CO2   7.4 L 


 


VBG O2 Sat (Calc)   87.8 H 


 


VBG Base Excess   -20.3 L 


 


VBG Potassium   3.7 


 


Sodium   148.0  154 H


 


Chloride   121.0 H  118 H


 


Glucose   575 H* D 


 


Lactate   1.8 


 


FiO2   21.0 


 


Crit Value Called To   Lorie wang 


 


Crit Value Called By   Gigi 


 


Crit Value Read Back   Y 


 


Blood Gas Notified Time   1726 


 


Potassium    4.0


 


Carbon Dioxide    6 L* D


 


Anion Gap    34 H


 


BUN    51 H


 


Creatinine    1.5


 


Est GFR ( Amer)    57


 


Est GFR (Non-Af Amer)    47


 


POC Glucose (mg/dL)   


 


Random Glucose    570 H* D


 


Serum Osmolality   


 


Calcium    9.1


 


Phosphorus    2.2 L


 


Magnesium    2.4 H


 


Total Bilirubin   


 


AST   


 


ALT   


 


Alkaline Phosphatase   


 


Total Creatine Kinase   


 


Troponin I   


 


Total Protein   


 


Albumin   


 


Globulin   


 


Albumin/Globulin Ratio   


 


Procalcitonin   


 


Free T4   


 


TSH 3rd Generation   


 


Venous Blood Potassium   3.7 


 


Urine Color   


 


Urine Clarity   


 


Urine pH   


 


Ur Specific Gravity   


 


Urine Protein   


 


Urine Glucose (UA)   


 


Urine Ketones   


 


Urine Blood   


 


Urine Nitrate   


 


Urine Bilirubin   


 


Urine Urobilinogen   


 


Ur Leukocyte Esterase   


 


Urine RBC (Auto)   


 


Urine Microscopic WBC   


 


Ur Squamous Epith Cells   


 


Urine Bacteria   


 


Hyaline Casts   


 


Random Vancomycin   














  03/16/18 03/16/18 03/16/18





  17:58 18:51 20:03


 


WBC   


 


RBC   


 


Hgb   


 


Hct   


 


MCV   


 


MCH   


 


MCHC   


 


RDW   


 


Plt Count   


 


MPV   


 


Neut % (Auto)   


 


Lymph % (Auto)   


 


Mono % (Auto)   


 


Eos % (Auto)   


 


Baso % (Auto)   


 


Neut # (Auto)   


 


Lymph # (Auto)   


 


Mono # (Auto)   


 


Eos # (Auto)   


 


Baso # (Auto)   


 


Neutrophils % (Manual)   


 


Band Neutrophils %   


 


Lymphocytes % (Manual)   


 


Monocytes % (Manual)   


 


Myelocytes %   


 


Platelet Estimate   


 


Large Platelets   


 


Giant Platelets   


 


Poikilocytosis (manual   


 


Anisocytosis (manual)   


 


Bighorn Cells   


 


ESR   


 


PT   


 


INR   


 


APTT   


 


pO2   


 


VBG pH   


 


VBG pCO2   


 


VBG HCO3   


 


VBG Total CO2   


 


VBG O2 Sat (Calc)   


 


VBG Base Excess   


 


VBG Potassium   


 


Sodium   


 


Chloride   


 


Glucose   


 


Lactate   


 


FiO2   


 


Crit Value Called To   


 


Crit Value Called By   


 


Crit Value Read Back   


 


Blood Gas Notified Time   


 


Potassium   


 


Carbon Dioxide   


 


Anion Gap   


 


BUN   


 


Creatinine   


 


Est GFR ( Amer)   


 


Est GFR (Non-Af Amer)   


 


POC Glucose (mg/dL)  454 H*  394 H  355 H


 


Random Glucose   


 


Serum Osmolality   


 


Calcium   


 


Phosphorus   


 


Magnesium   


 


Total Bilirubin   


 


AST   


 


ALT   


 


Alkaline Phosphatase   


 


Total Creatine Kinase   


 


Troponin I   


 


Total Protein   


 


Albumin   


 


Globulin   


 


Albumin/Globulin Ratio   


 


Procalcitonin   


 


Free T4   


 


TSH 3rd Generation   


 


Venous Blood Potassium   


 


Urine Color   


 


Urine Clarity   


 


Urine pH   


 


Ur Specific Gravity   


 


Urine Protein   


 


Urine Glucose (UA)   


 


Urine Ketones   


 


Urine Blood   


 


Urine Nitrate   


 


Urine Bilirubin   


 


Urine Urobilinogen   


 


Ur Leukocyte Esterase   


 


Urine RBC (Auto)   


 


Urine Microscopic WBC   


 


Ur Squamous Epith Cells   


 


Urine Bacteria   


 


Hyaline Casts   


 


Random Vancomycin   














  03/16/18 03/16/18 03/16/18





  21:07 21:37 21:53


 


WBC   


 


RBC   


 


Hgb   


 


Hct   


 


MCV   


 


MCH   


 


MCHC   


 


RDW   


 


Plt Count   


 


MPV   


 


Neut % (Auto)   


 


Lymph % (Auto)   


 


Mono % (Auto)   


 


Eos % (Auto)   


 


Baso % (Auto)   


 


Neut # (Auto)   


 


Lymph # (Auto)   


 


Mono # (Auto)   


 


Eos # (Auto)   


 


Baso # (Auto)   


 


Neutrophils % (Manual)   


 


Band Neutrophils %   


 


Lymphocytes % (Manual)   


 


Monocytes % (Manual)   


 


Myelocytes %   


 


Platelet Estimate   


 


Large Platelets   


 


Giant Platelets   


 


Poikilocytosis (manual   


 


Anisocytosis (manual)   


 


Bighorn Cells   


 


ESR   


 


PT   


 


INR   


 


APTT   


 


pO2   


 


VBG pH   


 


VBG pCO2   


 


VBG HCO3   


 


VBG Total CO2   


 


VBG O2 Sat (Calc)   


 


VBG Base Excess   


 


VBG Potassium   


 


Sodium   


 


Chloride   


 


Glucose   


 


Lactate   


 


FiO2   


 


Crit Value Called To   


 


Crit Value Called By   


 


Crit Value Read Back   


 


Blood Gas Notified Time   


 


Potassium   


 


Carbon Dioxide   


 


Anion Gap   


 


BUN   


 


Creatinine   


 


Est GFR ( Amer)   


 


Est GFR (Non-Af Amer)   


 


POC Glucose (mg/dL)  342 H   332 H


 


Random Glucose   


 


Serum Osmolality   


 


Calcium   


 


Phosphorus   


 


Magnesium   


 


Total Bilirubin   


 


AST   


 


ALT   


 


Alkaline Phosphatase   


 


Total Creatine Kinase   


 


Troponin I   0.8110 H* 


 


Total Protein   


 


Albumin   


 


Globulin   


 


Albumin/Globulin Ratio   


 


Procalcitonin   


 


Free T4   


 


TSH 3rd Generation   


 


Venous Blood Potassium   


 


Urine Color   


 


Urine Clarity   


 


Urine pH   


 


Ur Specific Gravity   


 


Urine Protein   


 


Urine Glucose (UA)   


 


Urine Ketones   


 


Urine Blood   


 


Urine Nitrate   


 


Urine Bilirubin   


 


Urine Urobilinogen   


 


Ur Leukocyte Esterase   


 


Urine RBC (Auto)   


 


Urine Microscopic WBC   


 


Ur Squamous Epith Cells   


 


Urine Bacteria   


 


Hyaline Casts   


 


Random Vancomycin   














  03/16/18 03/17/18 03/17/18





  22:57 00:02 00:58


 


WBC   


 


RBC   


 


Hgb   


 


Hct   


 


MCV   


 


MCH   


 


MCHC   


 


RDW   


 


Plt Count   


 


MPV   


 


Neut % (Auto)   


 


Lymph % (Auto)   


 


Mono % (Auto)   


 


Eos % (Auto)   


 


Baso % (Auto)   


 


Neut # (Auto)   


 


Lymph # (Auto)   


 


Mono # (Auto)   


 


Eos # (Auto)   


 


Baso # (Auto)   


 


Neutrophils % (Manual)   


 


Band Neutrophils %   


 


Lymphocytes % (Manual)   


 


Monocytes % (Manual)   


 


Myelocytes %   


 


Platelet Estimate   


 


Large Platelets   


 


Giant Platelets   


 


Poikilocytosis (manual   


 


Anisocytosis (manual)   


 


Bighorn Cells   


 


ESR   


 


PT   


 


INR   


 


APTT   


 


pO2   


 


VBG pH   


 


VBG pCO2   


 


VBG HCO3   


 


VBG Total CO2   


 


VBG O2 Sat (Calc)   


 


VBG Base Excess   


 


VBG Potassium   


 


Sodium   


 


Chloride   


 


Glucose   


 


Lactate   


 


FiO2   


 


Crit Value Called To   


 


Crit Value Called By   


 


Crit Value Read Back   


 


Blood Gas Notified Time   


 


Potassium   


 


Carbon Dioxide   


 


Anion Gap   


 


BUN   


 


Creatinine   


 


Est GFR ( Amer)   


 


Est GFR (Non-Af Amer)   


 


POC Glucose (mg/dL)  330 H  326 H  274 H


 


Random Glucose   


 


Serum Osmolality   


 


Calcium   


 


Phosphorus   


 


Magnesium   


 


Total Bilirubin   


 


AST   


 


ALT   


 


Alkaline Phosphatase   


 


Total Creatine Kinase   


 


Troponin I   


 


Total Protein   


 


Albumin   


 


Globulin   


 


Albumin/Globulin Ratio   


 


Procalcitonin   


 


Free T4   


 


TSH 3rd Generation   


 


Venous Blood Potassium   


 


Urine Color   


 


Urine Clarity   


 


Urine pH   


 


Ur Specific Gravity   


 


Urine Protein   


 


Urine Glucose (UA)   


 


Urine Ketones   


 


Urine Blood   


 


Urine Nitrate   


 


Urine Bilirubin   


 


Urine Urobilinogen   


 


Ur Leukocyte Esterase   


 


Urine RBC (Auto)   


 


Urine Microscopic WBC   


 


Ur Squamous Epith Cells   


 


Urine Bacteria   


 


Hyaline Casts   


 


Random Vancomycin   














  03/17/18 03/17/18 03/17/18





  02:00 03:03 04:02


 


WBC   


 


RBC   


 


Hgb   


 


Hct   


 


MCV   


 


MCH   


 


MCHC   


 


RDW   


 


Plt Count   


 


MPV   


 


Neut % (Auto)   


 


Lymph % (Auto)   


 


Mono % (Auto)   


 


Eos % (Auto)   


 


Baso % (Auto)   


 


Neut # (Auto)   


 


Lymph # (Auto)   


 


Mono # (Auto)   


 


Eos # (Auto)   


 


Baso # (Auto)   


 


Neutrophils % (Manual)   


 


Band Neutrophils %   


 


Lymphocytes % (Manual)   


 


Monocytes % (Manual)   


 


Myelocytes %   


 


Platelet Estimate   


 


Large Platelets   


 


Giant Platelets   


 


Poikilocytosis (manual   


 


Anisocytosis (manual)   


 


Bighorn Cells   


 


ESR   


 


PT   


 


INR   


 


APTT   


 


pO2   


 


VBG pH   


 


VBG pCO2   


 


VBG HCO3   


 


VBG Total CO2   


 


VBG O2 Sat (Calc)   


 


VBG Base Excess   


 


VBG Potassium   


 


Sodium   


 


Chloride   


 


Glucose   


 


Lactate   


 


FiO2   


 


Crit Value Called To   


 


Crit Value Called By   


 


Crit Value Read Back   


 


Blood Gas Notified Time   


 


Potassium   


 


Carbon Dioxide   


 


Anion Gap   


 


BUN   


 


Creatinine   


 


Est GFR ( Amer)   


 


Est GFR (Non-Af Amer)   


 


POC Glucose (mg/dL)  244 H  233 H  205 H


 


Random Glucose   


 


Serum Osmolality   


 


Calcium   


 


Phosphorus   


 


Magnesium   


 


Total Bilirubin   


 


AST   


 


ALT   


 


Alkaline Phosphatase   


 


Total Creatine Kinase   


 


Troponin I   


 


Total Protein   


 


Albumin   


 


Globulin   


 


Albumin/Globulin Ratio   


 


Procalcitonin   


 


Free T4   


 


TSH 3rd Generation   


 


Venous Blood Potassium   


 


Urine Color   


 


Urine Clarity   


 


Urine pH   


 


Ur Specific Gravity   


 


Urine Protein   


 


Urine Glucose (UA)   


 


Urine Ketones   


 


Urine Blood   


 


Urine Nitrate   


 


Urine Bilirubin   


 


Urine Urobilinogen   


 


Ur Leukocyte Esterase   


 


Urine RBC (Auto)   


 


Urine Microscopic WBC   


 


Ur Squamous Epith Cells   


 


Urine Bacteria   


 


Hyaline Casts   


 


Random Vancomycin   














  03/17/18 03/17/18 03/17/18





  04:42 04:42 04:42


 


WBC  31.9 H  


 


RBC  3.97 L  


 


Hgb  10.6 L D  


 


Hct  32.6 L  


 


MCV  82.0  D  


 


MCH  26.6 L  


 


MCHC  32.5 L  


 


RDW  16.2 H  


 


Plt Count  405 H  


 


MPV  10.8  


 


Neut % (Auto)  89.3 H  


 


Lymph % (Auto)  3.5 L  


 


Mono % (Auto)  7.1  


 


Eos % (Auto)  0.0  


 


Baso % (Auto)  0.1  


 


Neut # (Auto)  28.5 H  


 


Lymph # (Auto)  1.1  


 


Mono # (Auto)  2.3 H  


 


Eos # (Auto)  0.0  


 


Baso # (Auto)  0.0  


 


Neutrophils % (Manual)   


 


Band Neutrophils %   


 


Lymphocytes % (Manual)   


 


Monocytes % (Manual)   


 


Myelocytes %   


 


Platelet Estimate   


 


Large Platelets   


 


Giant Platelets   


 


Poikilocytosis (manual   


 


Anisocytosis (manual)   


 


Bighorn Cells   


 


ESR   


 


PT   


 


INR   


 


APTT   


 


pO2   


 


VBG pH   


 


VBG pCO2   


 


VBG HCO3   


 


VBG Total CO2   


 


VBG O2 Sat (Calc)   


 


VBG Base Excess   


 


VBG Potassium   


 


Sodium   163 H* 


 


Chloride   128 H 


 


Glucose   


 


Lactate   


 


FiO2   


 


Crit Value Called To   


 


Crit Value Called By   


 


Crit Value Read Back   


 


Blood Gas Notified Time   


 


Potassium   3.2 L 


 


Carbon Dioxide   17 L 


 


Anion Gap   21 H 


 


BUN   44 H 


 


Creatinine   1.1 


 


Est GFR ( Amer)   > 60 


 


Est GFR (Non-Af Amer)   > 60 


 


POC Glucose (mg/dL)   


 


Random Glucose   215 H 


 


Serum Osmolality   


 


Calcium   9.4 


 


Phosphorus   1.4 L 


 


Magnesium   2.2 


 


Total Bilirubin   0.3 


 


AST   18 


 


ALT   24 


 


Alkaline Phosphatase   87 


 


Total Creatine Kinase   


 


Troponin I   0.8850 H* 


 


Total Protein   6.9 


 


Albumin   2.9 L D 


 


Globulin   4.1 H 


 


Albumin/Globulin Ratio   0.7 L 


 


Procalcitonin   


 


Free T4   


 


TSH 3rd Generation   


 


Venous Blood Potassium   


 


Urine Color   


 


Urine Clarity   


 


Urine pH   


 


Ur Specific Gravity   


 


Urine Protein   


 


Urine Glucose (UA)   


 


Urine Ketones   


 


Urine Blood   


 


Urine Nitrate   


 


Urine Bilirubin   


 


Urine Urobilinogen   


 


Ur Leukocyte Esterase   


 


Urine RBC (Auto)   


 


Urine Microscopic WBC   


 


Ur Squamous Epith Cells   


 


Urine Bacteria   


 


Hyaline Casts   


 


Random Vancomycin    8.1














  03/17/18 03/17/18 03/17/18





  05:04 06:13 06:58


 


WBC   


 


RBC   


 


Hgb   


 


Hct   


 


MCV   


 


MCH   


 


MCHC   


 


RDW   


 


Plt Count   


 


MPV   


 


Neut % (Auto)   


 


Lymph % (Auto)   


 


Mono % (Auto)   


 


Eos % (Auto)   


 


Baso % (Auto)   


 


Neut # (Auto)   


 


Lymph # (Auto)   


 


Mono # (Auto)   


 


Eos # (Auto)   


 


Baso # (Auto)   


 


Neutrophils % (Manual)   


 


Band Neutrophils %   


 


Lymphocytes % (Manual)   


 


Monocytes % (Manual)   


 


Myelocytes %   


 


Platelet Estimate   


 


Large Platelets   


 


Giant Platelets   


 


Poikilocytosis (manual   


 


Anisocytosis (manual)   


 


Bighorn Cells   


 


ESR   


 


PT   


 


INR   


 


APTT   


 


pO2   


 


VBG pH   


 


VBG pCO2   


 


VBG HCO3   


 


VBG Total CO2   


 


VBG O2 Sat (Calc)   


 


VBG Base Excess   


 


VBG Potassium   


 


Sodium   


 


Chloride   


 


Glucose   


 


Lactate   


 


FiO2   


 


Crit Value Called To   


 


Crit Value Called By   


 


Crit Value Read Back   


 


Blood Gas Notified Time   


 


Potassium   


 


Carbon Dioxide   


 


Anion Gap   


 


BUN   


 


Creatinine   


 


Est GFR ( Amer)   


 


Est GFR (Non-Af Amer)   


 


POC Glucose (mg/dL)  186 H  161 H  147 H


 


Random Glucose   


 


Serum Osmolality   


 


Calcium   


 


Phosphorus   


 


Magnesium   


 


Total Bilirubin   


 


AST   


 


ALT   


 


Alkaline Phosphatase   


 


Total Creatine Kinase   


 


Troponin I   


 


Total Protein   


 


Albumin   


 


Globulin   


 


Albumin/Globulin Ratio   


 


Procalcitonin   


 


Free T4   


 


TSH 3rd Generation   


 


Venous Blood Potassium   


 


Urine Color   


 


Urine Clarity   


 


Urine pH   


 


Ur Specific Gravity   


 


Urine Protein   


 


Urine Glucose (UA)   


 


Urine Ketones   


 


Urine Blood   


 


Urine Nitrate   


 


Urine Bilirubin   


 


Urine Urobilinogen   


 


Ur Leukocyte Esterase   


 


Urine RBC (Auto)   


 


Urine Microscopic WBC   


 


Ur Squamous Epith Cells   


 


Urine Bacteria   


 


Hyaline Casts   


 


Random Vancomycin   














  03/17/18





  08:05


 


WBC 


 


RBC 


 


Hgb 


 


Hct 


 


MCV 


 


MCH 


 


MCHC 


 


RDW 


 


Plt Count 


 


MPV 


 


Neut % (Auto) 


 


Lymph % (Auto) 


 


Mono % (Auto) 


 


Eos % (Auto) 


 


Baso % (Auto) 


 


Neut # (Auto) 


 


Lymph # (Auto) 


 


Mono # (Auto) 


 


Eos # (Auto) 


 


Baso # (Auto) 


 


Neutrophils % (Manual) 


 


Band Neutrophils % 


 


Lymphocytes % (Manual) 


 


Monocytes % (Manual) 


 


Myelocytes % 


 


Platelet Estimate 


 


Large Platelets 


 


Giant Platelets 


 


Poikilocytosis (manual 


 


Anisocytosis (manual) 


 


Claude Cells 


 


ESR 


 


PT 


 


INR 


 


APTT 


 


pO2 


 


VBG pH 


 


VBG pCO2 


 


VBG HCO3 


 


VBG Total CO2 


 


VBG O2 Sat (Calc) 


 


VBG Base Excess 


 


VBG Potassium 


 


Sodium 


 


Chloride 


 


Glucose 


 


Lactate 


 


FiO2 


 


Crit Value Called To 


 


Crit Value Called By 


 


Crit Value Read Back 


 


Blood Gas Notified Time 


 


Potassium 


 


Carbon Dioxide 


 


Anion Gap 


 


BUN 


 


Creatinine 


 


Est GFR ( Amer) 


 


Est GFR (Non-Af Amer) 


 


POC Glucose (mg/dL)  185 H


 


Random Glucose 


 


Serum Osmolality 


 


Calcium 


 


Phosphorus 


 


Magnesium 


 


Total Bilirubin 


 


AST 


 


ALT 


 


Alkaline Phosphatase 


 


Total Creatine Kinase 


 


Troponin I 


 


Total Protein 


 


Albumin 


 


Globulin 


 


Albumin/Globulin Ratio 


 


Procalcitonin 


 


Free T4 


 


TSH 3rd Generation 


 


Venous Blood Potassium 


 


Urine Color 


 


Urine Clarity 


 


Urine pH 


 


Ur Specific Gravity 


 


Urine Protein 


 


Urine Glucose (UA) 


 


Urine Ketones 


 


Urine Blood 


 


Urine Nitrate 


 


Urine Bilirubin 


 


Urine Urobilinogen 


 


Ur Leukocyte Esterase 


 


Urine RBC (Auto) 


 


Urine Microscopic WBC 


 


Ur Squamous Epith Cells 


 


Urine Bacteria 


 


Hyaline Casts 


 


Random Vancomycin 











EKG/Cardiology Studies: 


Cardiology / EKG Studies





03/16/18 10:03


ELECTROCARDIOGRAM Stat 


   Comment: 


   Mode Of Transportation: 


   Reason For Exam: AMS





03/17/18 09:00


EKG [ELECTROCARDIOGRAM] DAILY 


   Comment: 


   Mode Of Transportation: 


   Reason For Exam: f/u hyperacute T's, r/o AMI











Fingerstick Blood Sugar Results: 147





Critical Care Progress Note





- Nutrition


Nutrition: 


 Nutrition











 Category Date Time Status


 


 NPO Diet [DIET] Diets  03/16/18 Dinner Active














Assessment/Plan





- Assessment and Plan (Free Text)


Assessment: 








1.   AMS secondary to Hyperosmolar Hyperglycemic state progressing to DKA, 


2.   Leukocytosis from DKA, but on empirical antibiotics


3-                    Hypernatremia: dehydration, DKA, hyperglycemia


4-                    Hypokalemia


5-                    Hypophosphatemia 








PLAN:


1.   IVFs, IV insulin until anion gap resolves. Follow serum K, mg, phos. As he 

is hypothermic, no specific antiarrhythmics needed at his time nor has there 

been any arrhythmia related to hyperkalemia. K should resolve as acidosis 

improves.K-Phos 30 meq ordered, has 44 meq ckl in that, spoke to pharmacy, pt 

needs lots of K, due to being on insulin and IVF and also has low K.


2.   f/u cultures, renal dose-adjusted Zosyn and Vanco for renal insufficiency.


3.   Serial lactates till normalized.


4.   Check Serum osmo ( calc osmo= 366), CPK, TFTs, Procalcitonin level, UDS, 

Trops)


5.   Hold Home meds including Cogentin, Haldol, Restoril. 


7.   Wound consult.


8.   Neurochecks, seizure precautions, elevate HOB in ICU. 


9-                    Endocrinology consult.





CCU Objective





- Vital Signs / Intake & Output

## 2018-03-18 LAB
BUN SERPL-MCNC: 23 MG/DL (ref 9–20)
CALCIUM SERPL-MCNC: 8.9 MG/DL (ref 8.4–10.2)
ERYTHROCYTE [DISTWIDTH] IN BLOOD BY AUTOMATED COUNT: 16.3 % (ref 11.5–14.5)
GFR NON-AFRICAN AMERICAN: > 60
HGB BLD-MCNC: 10.9 G/DL (ref 12–18)
MCH RBC QN AUTO: 26.9 PG (ref 27–31)
MCHC RBC AUTO-ENTMCNC: 33.1 G/DL (ref 33–37)
MCV RBC AUTO: 81.5 FL (ref 80–94)
PLATELET # BLD: 352 K/UL (ref 130–400)
RBC # BLD AUTO: 4.05 MIL/UL (ref 4.4–5.9)
WBC # BLD AUTO: 25.4 K/UL (ref 4.8–10.8)

## 2018-03-18 RX ADMIN — WATER SCH MLS/HR: 1 INJECTION INTRAMUSCULAR; INTRAVENOUS; SUBCUTANEOUS at 09:51

## 2018-03-18 RX ADMIN — INSULIN LISPRO SCH UNITS: 100 INJECTION, SOLUTION INTRAVENOUS; SUBCUTANEOUS at 06:38

## 2018-03-18 RX ADMIN — WATER SCH MLS/HR: 1 INJECTION INTRAMUSCULAR; INTRAVENOUS; SUBCUTANEOUS at 00:59

## 2018-03-18 RX ADMIN — POTASSIUM CHLORIDE AND DEXTROSE MONOHYDRATE SCH MLS/HR: 150; 5 INJECTION, SOLUTION INTRAVENOUS at 00:50

## 2018-03-18 RX ADMIN — SODIUM CHLORIDE AND POTASSIUM CHLORIDE SCH MLS/HR: 9; 2.98 INJECTION, SOLUTION INTRAVENOUS at 13:20

## 2018-03-18 RX ADMIN — ENOXAPARIN SODIUM SCH MG: 30 INJECTION SUBCUTANEOUS at 09:49

## 2018-03-18 RX ADMIN — WATER SCH MLS/HR: 1 INJECTION INTRAMUSCULAR; INTRAVENOUS; SUBCUTANEOUS at 13:21

## 2018-03-18 RX ADMIN — INSULIN LISPRO SCH: 100 INJECTION, SOLUTION INTRAVENOUS; SUBCUTANEOUS at 22:16

## 2018-03-18 RX ADMIN — INSULIN LISPRO SCH UNITS: 100 INJECTION, SOLUTION INTRAVENOUS; SUBCUTANEOUS at 16:46

## 2018-03-18 RX ADMIN — INSULIN LISPRO SCH: 100 INJECTION, SOLUTION INTRAVENOUS; SUBCUTANEOUS at 16:46

## 2018-03-18 RX ADMIN — INSULIN LISPRO SCH: 100 INJECTION, SOLUTION INTRAVENOUS; SUBCUTANEOUS at 11:55

## 2018-03-18 RX ADMIN — GLIPIZIDE SCH MG: 5 TABLET, EXTENDED RELEASE ORAL at 09:48

## 2018-03-18 RX ADMIN — INSULIN LISPRO SCH UNITS: 100 INJECTION, SOLUTION INTRAVENOUS; SUBCUTANEOUS at 11:56

## 2018-03-18 RX ADMIN — GLIPIZIDE SCH: 5 TABLET, EXTENDED RELEASE ORAL at 10:05

## 2018-03-18 RX ADMIN — INSULIN DETEMIR SCH UNITS: 100 INJECTION, SOLUTION SUBCUTANEOUS at 22:17

## 2018-03-18 RX ADMIN — WATER SCH MLS/HR: 1 INJECTION INTRAMUSCULAR; INTRAVENOUS; SUBCUTANEOUS at 20:15

## 2018-03-18 NOTE — PN
DATE:



ENDOCRINOLOGY FOLLOWUP NOTE



LOCATION:  ICU room number 424.



SUBJECTIVE:  This is a 67-year-old male with recent uncontrolled type 2

insulin requiring diabetes presenting here with diabetic ketoacidosis and

dehydration and has now improved clinically and metabolically overnight as

noted.  However, his glycemic fluctuations are still persistent, although

have improved as noted and the latest glucose levels have ranged from 181

to 333 mg/dL.  It was 375 at bedtime last night.



LABORATORY DATA:  The latest chemistry showed a BUN of 23, sodium of 152,

potassium of 3.3, chloride of 116, CO2 of 20, glucose of 389 and creatinine

of 0.8.



IMPRESSION AND PLAN:   So at this time, we will modify once again his IV

fluid hydration to replenish the lost fluids and electrolytes from the

underlying increased osmotic diuresis thereof, and we will change his

intravenous now to half-normal saline with KCL 40 mEq running at 150 mL per

hour as ordered.  We will also modify his basal and bolus insulin regimen

and increase the Humalog to 14 units subcutaneous t.i.d. before meals to

start at lunchtime today as ordered.  We will also increase the basal

insulin with Levemir to be given as 40 units subcutaneous at bedtime daily

to start tonight.  We will modify the coverage scale to obviate

hypoglycemia and detailed orders have been given.  We will obtain serial

chemistries and supplement accordingly needed.  We will follow.







__________________________________________

Bernie Collins MD







DD:  03/18/2018 12:07:08

DT:  03/18/2018 12:09:19

Job # [id number]

## 2018-03-18 NOTE — CP.PCM.PN
Subjective





- Date & Time of Evaluation


Date of Evaluation: 03/18/18


Time of Evaluation: 15:21





- Subjective


Subjective: 





Podiatry consult for Dr. Ascencio





67 year old male with AMS seen at bedside for left foot wound. Patient is AAO x 

2 at time of visit. Per nursing, no acute overnight events. No recent episodes 

of V/D/F





Objective





- Vital Signs/Intake and Output


Vital Signs (last 24 hours): 


 











Temp Pulse Resp BP Pulse Ox


 


 98.3 F   82   18   113/63   100 


 


 03/18/18 12:00  03/18/18 14:00  03/18/18 14:00  03/18/18 14:00  03/18/18 14:00








Intake and Output: 


 











 03/18/18 03/18/18





 06:59 18:59


 


Intake Total 2000 200


 


Output Total 1750 


 


Balance 250 200














- Medications


Medications: 


 Current Medications





Dextrose (Dextrose 50% Inj)  0 ml IV STAT PRN; Protocol


   PRN Reason: Hypoglycemia Protocol


Dextrose (Glutose 15)  0 gm PO ONCE PRN; Protocol


   PRN Reason: Hypoglycemia Protocol


Enoxaparin Sodium (Lovenox)  30 mg SC DAILY [facility]


   PRN Reason: Protocol


   Last Admin: 03/18/18 09:49 Dose:  30 mg


Glipizide (Glucotrol Xl)  5 mg PO BRK [facility]


   Last Admin: 03/18/18 10:05 Dose:  Not Given


Glucagon (Glucagen Diagnostic Kit)  0 mg IM STAT PRN; Protocol


   PRN Reason: Hypoglycemia Protocol


Piperacillin Sod/Tazobactam (Sod 3.375 gm/ Sodium Chloride)  100 mls @ 100 mls/

hr IVPB 0200,0800,1400,2000 [facility]


   PRN Reason: Protocol


   Last Admin: 03/18/18 13:21 Dose:  100 mls/hr


Oral Electrolytes (Kcl 40meq/ 0.9% 1l)  1,000 mls @ 150 mls/hr IV .Q6H40M [facility]


   Stop: 03/19/18 11:12


   Last Admin: 03/18/18 13:20 Dose:  150 mls/hr


Insulin Detemir (Levemir)  40 units SC HS SUZY


Insulin Human Lispro (Humalog)  0 units SC ACHS [facility]


   Last Admin: 03/18/18 11:55 Dose:  Not Given


Insulin Human Lispro (Humalog)  14 units SC AC [facility]


   Last Admin: 03/18/18 11:56 Dose:  14 units


Pantoprazole Sodium (Protonix Inj)  40 mg IVP DAILY [facility]


   Last Admin: 03/18/18 09:50 Dose:  40 mg


Pioglitazone HCl (Actos)  30 mg PO DAILY [facility]


   Last Admin: 03/18/18 10:04 Dose:  Not Given











- Labs


Labs: 


 





 03/18/18 04:37 





 03/18/18 04:37 





 











PT  13.6 Seconds (9.8-13.1)  H  03/16/18  10:26    


 


INR  1.2  (0.9-1.2)   03/16/18  10:26    


 


APTT  32.5 Seconds (25.6-37.1)   03/16/18  10:26    














- Constitutional


Appears: Well, Non-toxic, No Acute Distress





- Extremities Exam


Additional comments: 





VASC: LLE DP and PT pulses nonpalpable. RLE DP pulse weakly palpalbe 1/4, PT 

nonpalpable. CFT <3 seconds to all digits x10. Mild nonpitting edmea noted to 

left foot.





NEURO: Unable to assess due to AMS





DERM: Necrotic eschar noted to dorsolateral left foot with periwound skin 

erosion - eschar is hard/firm at all areas with previous areas of fluctuance 

noted to be firm today as well following bedside I and D. No periwound erythema

, no malodor, no purulent drainage expressed. No other clinical signs of 

infection noted





ORTHO: Unable to assess pain. LLE ankle joint ROM limited





- Neurological Exam


Neurological Exam: Alert, Awake, Oriented x3





- Psychiatric Exam


Psychiatric exam: Normal Affect, Normal Mood





Assessment and Plan





- Assessment and Plan (Free Text)


Assessment: 





67M with unstageable necrotic eschar to dorsum of left foot, r/o source sepsis


Plan: 





Patient seen and evaluated at bedside


Afebrile, WBC 25.4 from 31.9 yesterday


Plan discussed with attending Dr. Ascencio


Tib/fib xrays ordered and reviewed; negative for soft tissue emphysema


MRI ordered but patient is too combative so no imaging taken per nursing


Wound dressed with gauze, ABD, kirlix


Multipodus boots placed b/l


No surgical intervention planned at this time


Continue IV abx per ID


Podiatry will continue to follow while patient in house

## 2018-03-18 NOTE — CP.PCM.PN
Subjective





- Date & Time of Evaluation


Date of Evaluation: 03/18/18


Time of Evaluation: 09:00





- Subjective


Subjective: 





67 year old male patient PMHx IDDM, s/p pancreatic resection seen at bedside 

for left foot wound. Patient still experiencing AMS. 


admitted to ICU with DKA r/o ACS AMS dehydration and infected left foot with 

eschar 

















Objective





- Vital Signs/Intake and Output


Vital Signs (last 24 hours): 


 











Temp Pulse Resp BP Pulse Ox


 


 98.3 F   83   20   119/64   100 


 


 03/18/18 12:00  03/18/18 12:00  03/18/18 12:00  03/18/18 12:00  03/18/18 12:00








Intake and Output: 


 











 03/18/18 03/18/18





 06:59 18:59


 


Intake Total 2000 200


 


Output Total 1750 


 


Balance 250 200














- Medications


Medications: 


 Current Medications





Dextrose (Dextrose 50% Inj)  0 ml IV STAT PRN; Protocol


   PRN Reason: Hypoglycemia Protocol


Dextrose (Glutose 15)  0 gm PO ONCE PRN; Protocol


   PRN Reason: Hypoglycemia Protocol


Enoxaparin Sodium (Lovenox)  30 mg SC DAILY [facility]


   PRN Reason: Protocol


   Last Admin: 03/18/18 09:49 Dose:  30 mg


Glipizide (Glucotrol Xl)  5 mg PO BRK [facility]


   Last Admin: 03/18/18 10:05 Dose:  Not Given


Glucagon (Glucagen Diagnostic Kit)  0 mg IM STAT PRN; Protocol


   PRN Reason: Hypoglycemia Protocol


Piperacillin Sod/Tazobactam (Sod 3.375 gm/ Sodium Chloride)  100 mls @ 100 mls/

hr IVPB 0200,0800,1400,2000 [facility]


   PRN Reason: Protocol


   Last Admin: 03/18/18 13:21 Dose:  100 mls/hr


Oral Electrolytes (Kcl 40meq/ 0.9% 1l)  1,000 mls @ 150 mls/hr IV .Q6H40M [facility]


   Stop: 03/19/18 11:12


   Last Admin: 03/18/18 13:20 Dose:  150 mls/hr


Insulin Detemir (Levemir)  40 units SC HS [facility]


Insulin Human Lispro (Humalog)  0 units SC ACHS [facility]


   Last Admin: 03/18/18 11:55 Dose:  Not Given


Insulin Human Lispro (Humalog)  14 units SC AC [facility]


   Last Admin: 03/18/18 11:56 Dose:  14 units


Pantoprazole Sodium (Protonix Inj)  40 mg IVP DAILY [facility]


   Last Admin: 03/18/18 09:50 Dose:  40 mg


Pioglitazone HCl (Actos)  30 mg PO DAILY [facility]


   Last Admin: 03/18/18 10:04 Dose:  Not Given











- Labs


Labs: 


 





 03/18/18 04:37 





 03/18/18 04:37 





 











PT  13.6 Seconds (9.8-13.1)  H  03/16/18  10:26    


 


INR  1.2  (0.9-1.2)   03/16/18  10:26    


 


APTT  32.5 Seconds (25.6-37.1)   03/16/18  10:26    














- Constitutional


Appears: Confused, Chronically Ill





- Head Exam


Head Exam: NORMOCEPHALIC





- Eye Exam


Eye Exam: absent: Scleral icterus





- ENT Exam


ENT Exam: Mucous Membranes Dry





- Neck Exam


Neck Exam: absent: Lymphadenopathy





- Respiratory Exam


Respiratory Exam: Decreased Breath Sounds, Rhonchi





- Cardiovascular Exam


Cardiovascular Exam: REGULAR RHYTHM, +S1, +S2





- GI/Abdominal Exam


GI & Abdominal Exam: Distended, Soft





- Rectal Exam


Rectal Exam: Deferred





-  Exam


 Exam: NORMAL INSPECTION





- Extremities Exam


Extremities Exam: Pedal Edema


Additional comments: 





VASC: LLE DP and PT pulses nonpalpable. RLE DP pulse weakly palpalbe 1/4, PT 

nonpalpable. CFT <3 seconds to all digits x10. Mild nonpitting edmea noted to 

left foot.








DERM: Necrotic eschar noted to dorsolateral left foot with periwound skin 

erosion - eschar is hard/firm except for area on lateral foot which is boggy to 

palpation and fluctuant to touch; periwound erythema noted.





ORTHO: Unable to assess pain. LLE ankle joint ROM limited








- Back Exam


Back Exam: absent: CVA tenderness (L), CVA tenderness (R)





- Neurological Exam


Neurological Exam: Altered


Neuro motor strength exam: Left Upper Extremity: 4, Right Upper Extremity: 4, 

Left Lower Extremity: 4, Right Lower Extremity: 4





- Psychiatric Exam


Psychiatric exam: Depressed





- Skin


Skin Exam: Dry





Assessment and Plan





- Assessment and Plan (Free Text)


Assessment: 





admitted to ICU with DKA and possible sepsis with infected left foot wound


unclear if wound is due to burn ? 


wound c/s taken today by podiatry


empiric IV antibiotics in progress


wound care in progress


remains altered secondary to DKA


consider MRI/ vascular studies of LLE

## 2018-03-18 NOTE — CP.CCUPN
CCU Subjective





- Physician Review


Events Since Last Encounter (Free Text): 





03/18/18 08:33


Pt has been confused, but BP has been stable, no fever, electrolytes are 

improving, also leukocytosis, endocrinologist managing his blood sugar, was 

seen by Dr Nicholas, cardiologist due to high TNI, does not have ACS, has been on 

antbiotics, empirically due to leukocytosis and ID following but no fever, 

cultures has been negative so far. CT head was also negative for any acute 

pathology, on 3/16. 





CCU Objective





- Vital Signs / Intake & Output


Vital Signs (Last 4 hours): 


Vital Signs











  Temp Pulse Resp BP Pulse Ox


 


 03/18/18 08:00  98.1 F  83  19  125/69  100


 


 03/18/18 06:00   82  16  144/67  100











Intake and Output (Last 8hrs): 


 Intake & Output











 03/17/18 03/18/18 03/18/18





 22:59 06:59 14:59


 


Intake Total 3400 1300 


 


Output Total 700 1750 


 


Balance 2700 -450 


 


Intake:   


 


  IV 2250 1200 


 


  Intake, Piggyback 550 100 


 


  Oral 600  


 


Output:   


 


  Urine 700 1750 


 


    Urethral (Mcgee) 700 1750 


 


Other:   


 


  # Bowel Movements 0 1 














- Physical Exam


Narrative Physical Exam (Free Text): 





03/18/18 08:37


P/E


Neck; No JVD


Lungs: no ronchi, crackles


Abdomen; soft, non-tender, BS +ve


Ext: No edma


heart: no gallop








- Medications


Active Medications: 


Active Medications











Generic Name Dose Route Start Last Admin





  Trade Name Freq  PRN Reason Stop Dose Admin


 


Dextrose  0 ml  03/16/18 10:50  





  Dextrose 50% Inj  IV   





  STAT PRN   





  Hypoglycemia Protocol   





  Protocol   


 


Dextrose  0 gm  03/16/18 10:50  





  Glutose 15  PO   





  ONCE PRN   





  Hypoglycemia Protocol   





  Protocol   


 


Enoxaparin Sodium  30 mg  03/16/18 14:00  03/17/18 12:53





  Lovenox  SC   30 mg





  DAILY SUZY   Administration





  Protocol   


 


Glipizide  5 mg  03/18/18 08:30  





  Glucotrol Xl  PO   





  BRK SUZY   


 


Glucagon  0 mg  03/16/18 10:50  





  Glucagen Diagnostic Kit  IM   





  STAT PRN   





  Hypoglycemia Protocol   





  Protocol   


 


Piperacillin Sod/Tazobactam  100 mls @ 100 mls/hr  03/17/18 02:00  03/18/18 00:

59





  Sod 3.375 gm/ Sodium Chloride  IVPB   100 mls/hr





  0200,0800,1400,2000 SUZY   Administration





  Protocol   


 


Potassium Chloride/Dextrose  1,000 mls @ 150 mls/hr  03/17/18 13:15  03/18/18 00

:50





  Potassium Chl 20 Meq In D5w  IV  03/18/18 13:05  150 mls/hr





  .Q6H40M SUZY   Administration


 


Insulin Detemir  30 units  03/18/18 22:00  





  Levemir  SC   





  HS SUZY   


 


Insulin Human Lispro  0 units  03/17/18 11:30  03/18/18 06:38





  Humalog  SC   3 units





  ACHS SUZY   Administration


 


Insulin Human Lispro  12 units  03/18/18 07:30  03/18/18 06:37





  Humalog  SC   12 u





  AC SUZY   Administration


 


Metformin HCl  500 mg  03/18/18 17:00  





  Glucophage  PO   





  BIDWM SUZY   


 


Pantoprazole Sodium  40 mg  03/17/18 09:00  03/17/18 09:04





  Protonix Inj  IVP   40 mg





  DAILY [facility]ZY   Administration


 


Pioglitazone HCl  30 mg  03/18/18 09:00  





  Actos  PO   





  DAILY [facility]   














- Patient Studies


Lab Studies: 


 Microbiology Studies











 03/16/18 11:54 Blood Culture - Preliminary





 Blood    NO GROWTH AFTER 24 HOURS


 


 03/16/18 12:11 Urine Culture - Final





 Urine    No Growth (<1,000 CFU/ML)


 


 03/16/18 10:26 Blood Culture - Preliminary





 Blood    NO GROWTH AFTER 24 HOURS








 Lab Studies











  03/18/18 03/18/18 03/18/18 Range/Units





  06:14 04:37 04:37 


 


WBC    25.4 H  (4.8-10.8)  K/uL


 


RBC    4.05 L  (4.40-5.90)  Mil/uL


 


Hgb    10.9 L  (12.0-18.0)  g/dL


 


Hct    33.0 L  (35.0-51.0)  %


 


MCV    81.5  (80.0-94.0)  fl


 


MCH    26.9 L  (27.0-31.0)  pg


 


MCHC    33.1  (33.0-37.0)  g/dL


 


RDW    16.3 H  (11.5-14.5)  %


 


Plt Count    352  (130-400)  K/uL


 


Sodium   152 H   (132-148)  mmol/l


 


Potassium   3.3 L   (3.6-5.0)  MMOL/L


 


Chloride   116 H   ()  mmol/L


 


Carbon Dioxide   20 L   (22-30)  mmol/L


 


Anion Gap   19   (10-20)  


 


BUN   23 H   (9-20)  mg/dl


 


Creatinine   0.8   (0.8-1.5)  mg/dl


 


Est GFR (African Amer)   > 60   


 


Est GFR (Non-Af Amer)   > 60   


 


POC Glucose (mg/dL)  333 H    ()  mg/dL


 


Random Glucose   389 H   ()  mg/dL


 


Calcium   8.9   (8.4-10.2)  mg/dL


 


Troponin I     (0.00-0.120)  ng/mL


 


Urine Opiates Screen     (NEGATIVE)  


 


Urine Methadone Screen     (NEGATIVE)  


 


Ur Barbiturates Screen     (NEGATIVE)  


 


Ur Phencyclidine Scrn     (NEGATIVE)  


 


Ur Amphetamines Screen     (NEGATIVE)  


 


U Benzodiazepines Scrn     (NEGATIVE)  


 


U Oth Cocaine Metabols     (NEGATIVE)  


 


U Cannabinoids Screen     (NEGATIVE)  














  03/17/18 03/17/18 03/17/18 Range/Units





  22:11 16:44 15:00 


 


WBC     (4.8-10.8)  K/uL


 


RBC     (4.40-5.90)  Mil/uL


 


Hgb     (12.0-18.0)  g/dL


 


Hct     (35.0-51.0)  %


 


MCV     (80.0-94.0)  fl


 


MCH     (27.0-31.0)  pg


 


MCHC     (33.0-37.0)  g/dL


 


RDW     (11.5-14.5)  %


 


Plt Count     (130-400)  K/uL


 


Sodium     (132-148)  mmol/l


 


Potassium     (3.6-5.0)  MMOL/L


 


Chloride     ()  mmol/L


 


Carbon Dioxide     (22-30)  mmol/L


 


Anion Gap     (10-20)  


 


BUN     (9-20)  mg/dl


 


Creatinine     (0.8-1.5)  mg/dl


 


Est GFR (African Amer)     


 


Est GFR (Non-Af Amer)     


 


POC Glucose (mg/dL)  375 H  335 H   ()  mg/dL


 


Random Glucose     ()  mg/dL


 


Calcium     (8.4-10.2)  mg/dL


 


Troponin I    0.4930 H*  (0.00-0.120)  ng/mL


 


Urine Opiates Screen     (NEGATIVE)  


 


Urine Methadone Screen     (NEGATIVE)  


 


Ur Barbiturates Screen     (NEGATIVE)  


 


Ur Phencyclidine Scrn     (NEGATIVE)  


 


Ur Amphetamines Screen     (NEGATIVE)  


 


U Benzodiazepines Scrn     (NEGATIVE)  


 


U Oth Cocaine Metabols     (NEGATIVE)  


 


U Cannabinoids Screen     (NEGATIVE)  














  03/17/18 03/17/18 03/17/18 Range/Units





  14:30 11:43 09:17 


 


WBC     (4.8-10.8)  K/uL


 


RBC     (4.40-5.90)  Mil/uL


 


Hgb     (12.0-18.0)  g/dL


 


Hct     (35.0-51.0)  %


 


MCV     (80.0-94.0)  fl


 


MCH     (27.0-31.0)  pg


 


MCHC     (33.0-37.0)  g/dL


 


RDW     (11.5-14.5)  %


 


Plt Count     (130-400)  K/uL


 


Sodium  157 H    (132-148)  mmol/l


 


Potassium  3.4 L    (3.6-5.0)  MMOL/L


 


Chloride  121 H    ()  mmol/L


 


Carbon Dioxide  16 L    (22-30)  mmol/L


 


Anion Gap  23 H    (10-20)  


 


BUN  39 H    (9-20)  mg/dl


 


Creatinine  1.0    (0.8-1.5)  mg/dl


 


Est GFR (African Amer)  > 60    


 


Est GFR (Non-Af Amer)  > 60    


 


POC Glucose (mg/dL)   234 H  169 H  ()  mg/dL


 


Random Glucose  343 H    ()  mg/dL


 


Calcium  9.1    (8.4-10.2)  mg/dL


 


Troponin I     (0.00-0.120)  ng/mL


 


Urine Opiates Screen     (NEGATIVE)  


 


Urine Methadone Screen     (NEGATIVE)  


 


Ur Barbiturates Screen     (NEGATIVE)  


 


Ur Phencyclidine Scrn     (NEGATIVE)  


 


Ur Amphetamines Screen     (NEGATIVE)  


 


U Benzodiazepines Scrn     (NEGATIVE)  


 


U Oth Cocaine Metabols     (NEGATIVE)  


 


U Cannabinoids Screen     (NEGATIVE)  














  03/17/18 03/16/18 Range/Units





  09:04 17:50 


 


WBC    (4.8-10.8)  K/uL


 


RBC    (4.40-5.90)  Mil/uL


 


Hgb    (12.0-18.0)  g/dL


 


Hct    (35.0-51.0)  %


 


MCV    (80.0-94.0)  fl


 


MCH    (27.0-31.0)  pg


 


MCHC    (33.0-37.0)  g/dL


 


RDW    (11.5-14.5)  %


 


Plt Count    (130-400)  K/uL


 


Sodium  162 H*   (132-148)  mmol/l


 


Potassium  3.5 L   (3.6-5.0)  MMOL/L


 


Chloride  127 H   ()  mmol/L


 


Carbon Dioxide  17 L   (22-30)  mmol/L


 


Anion Gap  22 H   (10-20)  


 


BUN  43 H   (9-20)  mg/dl


 


Creatinine  1.1   (0.8-1.5)  mg/dl


 


Est GFR (African Amer)  > 60   


 


Est GFR (Non-Af Amer)  > 60   


 


POC Glucose (mg/dL)    ()  mg/dL


 


Random Glucose  190 H   ()  mg/dL


 


Calcium  9.5   (8.4-10.2)  mg/dL


 


Troponin I    (0.00-0.120)  ng/mL


 


Urine Opiates Screen   Negative  (NEGATIVE)  


 


Urine Methadone Screen   Negative  (NEGATIVE)  


 


Ur Barbiturates Screen   Negative  (NEGATIVE)  


 


Ur Phencyclidine Scrn   Negative  (NEGATIVE)  


 


Ur Amphetamines Screen   Negative  (NEGATIVE)  


 


U Benzodiazepines Scrn   Negative  (NEGATIVE)  


 


U Oth Cocaine Metabols   Negative  (NEGATIVE)  


 


U Cannabinoids Screen   Negative  (NEGATIVE)  








 Laboratory Results - last 24 hr











  03/16/18 03/17/18 03/17/18





  17:50 09:04 09:17


 


WBC   


 


RBC   


 


Hgb   


 


Hct   


 


MCV   


 


MCH   


 


MCHC   


 


RDW   


 


Plt Count   


 


Sodium   162 H* 


 


Potassium   3.5 L 


 


Chloride   127 H 


 


Carbon Dioxide   17 L 


 


Anion Gap   22 H 


 


BUN   43 H 


 


Creatinine   1.1 


 


Est GFR ( Amer)   > 60 


 


Est GFR (Non-Af Amer)   > 60 


 


POC Glucose (mg/dL)    169 H


 


Random Glucose   190 H 


 


Calcium   9.5 


 


Troponin I   


 


Urine Opiates Screen  Negative  


 


Urine Methadone Screen  Negative  


 


Ur Barbiturates Screen  Negative  


 


Ur Phencyclidine Scrn  Negative  


 


Ur Amphetamines Screen  Negative  


 


U Benzodiazepines Scrn  Negative  


 


U Oth Cocaine Metabols  Negative  


 


U Cannabinoids Screen  Negative  














  03/17/18 03/17/18 03/17/18





  11:43 14:30 15:00


 


WBC   


 


RBC   


 


Hgb   


 


Hct   


 


MCV   


 


MCH   


 


MCHC   


 


RDW   


 


Plt Count   


 


Sodium   157 H 


 


Potassium   3.4 L 


 


Chloride   121 H 


 


Carbon Dioxide   16 L 


 


Anion Gap   23 H 


 


BUN   39 H 


 


Creatinine   1.0 


 


Est GFR ( Amer)   > 60 


 


Est GFR (Non-Af Amer)   > 60 


 


POC Glucose (mg/dL)  234 H  


 


Random Glucose   343 H 


 


Calcium   9.1 


 


Troponin I    0.4930 H*


 


Urine Opiates Screen   


 


Urine Methadone Screen   


 


Ur Barbiturates Screen   


 


Ur Phencyclidine Scrn   


 


Ur Amphetamines Screen   


 


U Benzodiazepines Scrn   


 


U Oth Cocaine Metabols   


 


U Cannabinoids Screen   














  03/17/18 03/17/18 03/18/18





  16:44 22:11 04:37


 


WBC    25.4 H


 


RBC    4.05 L


 


Hgb    10.9 L


 


Hct    33.0 L


 


MCV    81.5


 


MCH    26.9 L


 


MCHC    33.1


 


RDW    16.3 H


 


Plt Count    352


 


Sodium   


 


Potassium   


 


Chloride   


 


Carbon Dioxide   


 


Anion Gap   


 


BUN   


 


Creatinine   


 


Est GFR ( Amer)   


 


Est GFR (Non-Af Amer)   


 


POC Glucose (mg/dL)  335 H  375 H 


 


Random Glucose   


 


Calcium   


 


Troponin I   


 


Urine Opiates Screen   


 


Urine Methadone Screen   


 


Ur Barbiturates Screen   


 


Ur Phencyclidine Scrn   


 


Ur Amphetamines Screen   


 


U Benzodiazepines Scrn   


 


U Oth Cocaine Metabols   


 


U Cannabinoids Screen   














  03/18/18 03/18/18





  04:37 06:14


 


WBC  


 


RBC  


 


Hgb  


 


Hct  


 


MCV  


 


MCH  


 


MCHC  


 


RDW  


 


Plt Count  


 


Sodium  152 H 


 


Potassium  3.3 L 


 


Chloride  116 H 


 


Carbon Dioxide  20 L 


 


Anion Gap  19 


 


BUN  23 H 


 


Creatinine  0.8 


 


Est GFR ( Amer)  > 60 


 


Est GFR (Non-Af Amer)  > 60 


 


POC Glucose (mg/dL)   333 H


 


Random Glucose  389 H 


 


Calcium  8.9 


 


Troponin I  


 


Urine Opiates Screen  


 


Urine Methadone Screen  


 


Ur Barbiturates Screen  


 


Ur Phencyclidine Scrn  


 


Ur Amphetamines Screen  


 


U Benzodiazepines Scrn  


 


U Oth Cocaine Metabols  


 


U Cannabinoids Screen  











EKG/Cardiology Studies: 


Cardiology / EKG Studies





03/17/18 09:00


EKG [ELECTROCARDIOGRAM] DAILY 


   Comment: 


   Mode Of Transportation: 


   Reason For Exam: f/u hyperacute T's, r/o AMI











Fingerstick Blood Sugar Results: 333





Critical Care Progress Note





- Nutrition


Nutrition: 


 Nutrition











 Category Date Time Status


 


 Consistent Carbohydrate [DIET] Diets  03/17/18 Lunch Active














Assessment/Plan





- Assessment and Plan (Free Text)


Assessment: 








- Assessment and Plan (Free Text)


Assessment: 








1.   AMS secondary to Hyperosmolar Hyperglycemic state progressing to DKA, CT 

head was negative, very little improvement, still has abnormal electrolytes, 

but improving, will continue to monitor., 


2.   Leukocytosis from DKA, but on empirical antibiotics, WBC count was 31k, 

todat decreased to 25 K, cultures has been negative, no fever, ID following.


3-                    Hypernatremia: dehydration, DKA, hyperglycemia: Gap is 

closing, Off Insulin GGT, as per Endocrinologist, Levemir increased , will 

repeat BMP in PM


4-                    Hypokalemia: due to IVF, insulin, 


5-                    Hypophosphatemia : given 30 mmol Phosphate yesterday, 

level pending this AM.








PLAN:


1.   Off insulin GGT, as per Endo, on s/q insulin, monitor BS and BMP closely 


2.   f/u cultures, on Zosyn and Vanco for renal insufficiency.


3.   Serial lactates till normalized.


4.   Will continue hypotonic fluid 1/2 NS now and Kcl , hypernatremia improving 

but still high , 153 today


5.   Hold Home meds including Cogentin, Haldol, Restoril. 


7.                     Podiatry consult appreciated.


8.   Neurochecks, seizure precautions, elevate HOB in ICU. 


9-                    Endocrinology consult appreciated.

## 2018-03-19 VITALS — RESPIRATION RATE: 20 BRPM

## 2018-03-19 LAB
ALBUMIN SERPL-MCNC: 2.6 G/DL (ref 3.5–5)
ALBUMIN/GLOB SERPL: 0.7 {RATIO} (ref 1–2.1)
ALT SERPL-CCNC: 26 U/L (ref 21–72)
AST SERPL-CCNC: 21 U/L (ref 17–59)
BASOPHILS # BLD AUTO: 0.1 K/UL (ref 0–0.2)
BASOPHILS NFR BLD: 0.5 % (ref 0–2)
BUN SERPL-MCNC: 17 MG/DL (ref 9–20)
CALCIUM SERPL-MCNC: 8.1 MG/DL (ref 8.4–10.2)
EOSINOPHIL # BLD AUTO: 0.4 K/UL (ref 0–0.7)
EOSINOPHIL NFR BLD: 2 % (ref 0–4)
ERYTHROCYTE [DISTWIDTH] IN BLOOD BY AUTOMATED COUNT: 15.9 % (ref 11.5–14.5)
GFR NON-AFRICAN AMERICAN: > 60
HGB BLD-MCNC: 10.5 G/DL (ref 12–18)
LYMPHOCYTES # BLD AUTO: 2.6 K/UL (ref 1–4.3)
LYMPHOCYTES NFR BLD AUTO: 14.4 % (ref 20–40)
MCH RBC QN AUTO: 26.6 PG (ref 27–31)
MCHC RBC AUTO-ENTMCNC: 32.9 G/DL (ref 33–37)
MCV RBC AUTO: 80.9 FL (ref 80–94)
MONOCYTES # BLD: 1.5 K/UL (ref 0–0.8)
MONOCYTES NFR BLD: 8.3 % (ref 0–10)
NEUTROPHILS # BLD: 13.8 K/UL (ref 1.8–7)
NEUTROPHILS NFR BLD AUTO: 74.8 % (ref 50–75)
NRBC BLD AUTO-RTO: 0.6 % (ref 0–0)
PLATELET # BLD: 314 K/UL (ref 130–400)
PMV BLD AUTO: 10.9 FL (ref 7.2–11.7)
RBC # BLD AUTO: 3.95 MIL/UL (ref 4.4–5.9)
WBC # BLD AUTO: 18.4 K/UL (ref 4.8–10.8)

## 2018-03-19 RX ADMIN — WATER SCH MLS/HR: 1 INJECTION INTRAMUSCULAR; INTRAVENOUS; SUBCUTANEOUS at 02:25

## 2018-03-19 RX ADMIN — INSULIN LISPRO SCH UNITS: 100 INJECTION, SOLUTION INTRAVENOUS; SUBCUTANEOUS at 12:21

## 2018-03-19 RX ADMIN — INSULIN LISPRO SCH: 100 INJECTION, SOLUTION INTRAVENOUS; SUBCUTANEOUS at 17:56

## 2018-03-19 RX ADMIN — SODIUM CHLORIDE AND POTASSIUM CHLORIDE SCH MLS/HR: 9; 2.98 INJECTION, SOLUTION INTRAVENOUS at 00:35

## 2018-03-19 RX ADMIN — INSULIN LISPRO SCH UNITS: 100 INJECTION, SOLUTION INTRAVENOUS; SUBCUTANEOUS at 08:23

## 2018-03-19 RX ADMIN — WATER SCH MLS/HR: 1 INJECTION INTRAMUSCULAR; INTRAVENOUS; SUBCUTANEOUS at 15:00

## 2018-03-19 RX ADMIN — WATER SCH MLS/HR: 1 INJECTION INTRAMUSCULAR; INTRAVENOUS; SUBCUTANEOUS at 20:30

## 2018-03-19 RX ADMIN — INSULIN LISPRO SCH: 100 INJECTION, SOLUTION INTRAVENOUS; SUBCUTANEOUS at 17:48

## 2018-03-19 RX ADMIN — ENOXAPARIN SODIUM SCH MG: 30 INJECTION SUBCUTANEOUS at 08:25

## 2018-03-19 RX ADMIN — GLIPIZIDE SCH MG: 5 TABLET, EXTENDED RELEASE ORAL at 08:24

## 2018-03-19 RX ADMIN — INSULIN LISPRO SCH: 100 INJECTION, SOLUTION INTRAVENOUS; SUBCUTANEOUS at 21:48

## 2018-03-19 RX ADMIN — INSULIN DETEMIR SCH UNITS: 100 INJECTION, SOLUTION SUBCUTANEOUS at 21:50

## 2018-03-19 RX ADMIN — INSULIN LISPRO SCH: 100 INJECTION, SOLUTION INTRAVENOUS; SUBCUTANEOUS at 06:31

## 2018-03-19 RX ADMIN — WATER SCH MLS/HR: 1 INJECTION INTRAMUSCULAR; INTRAVENOUS; SUBCUTANEOUS at 08:26

## 2018-03-19 RX ADMIN — INSULIN LISPRO SCH: 100 INJECTION, SOLUTION INTRAVENOUS; SUBCUTANEOUS at 12:21

## 2018-03-19 NOTE — MRI
PROCEDURE:  MRI Left Foot



HISTORY:

Pain. 



COMPARISON:

None available. 



TECHNIQUE:

Multiecho multiplanar sequences were performed through the left foot 

without the use of intravenous contrast.



FINDINGS:



BONES:

No fracture. Normal marrow signal. No evidence to suggest 

osteomyelitis. Diffuse, mild-to-moderate degenerative joint changes 

seen throughout the joints of the forefoot midfoot and hindfoot. 



MUSCLES:

Edematous changes are identified in the hindfoot plantar flexor 

musculature superficial to the plantar fascia in a pattern suspicious 

for myositis.  An abscess is not favored though limited bladder fluid 

is suggested collecting immediately deep to the plantar plate. No 

intrinsic edema is seen related to the plantar plate though its 

posterior 3rd appears somewhat thickened history reflecting chronic 

fasciitis.  Clinically correlate further. 



SOFT TISSUES:

Mild dorsal lateral subcutaneous edema is appreciated compatible with 

cellulitis without definite abscess in the same distribution. 



LISFRANC LIGAMENT:

Intact without gross tear. 



PLANTAR PLATE:

As above in muscle section. 



EXTENSOR TENDONS:

Intact without tear. 



FLEXOR TENDONS:

Intact without tear. 



OTHER FINDINGS:

None.



IMPRESSION:

Findings suspicious for dorsal cellulitis with plantar foot myositis 

in trace fluid underlying the plantar fascia. No acute edema is seen 

throughout the plantar fascia though chronic fasciitis is not 

excluded at the posterior segment of the plantar fascia.



No MR evidence to suggest osteomyelitis at this time.

## 2018-03-19 NOTE — CP.PCM.PN
Subjective





- Date & Time of Evaluation


Date of Evaluation: 03/19/18


Time of Evaluation: 10:30





- Subjective


Subjective: 





Patinet is much more awake


 Has no chest pain or SOB accuchecks still elevated.





Objective





- Vital Signs/Intake and Output


Vital Signs (last 24 hours): 


 











Temp Pulse Resp BP Pulse Ox


 


 98.0 F   89   20   138/78   97 


 


 03/19/18 23:48  03/19/18 23:48  03/19/18 23:48  03/19/18 23:48  03/19/18 23:48








Intake and Output: 


 











 03/19/18 03/20/18





 18:59 06:59


 


Intake Total 100 


 


Output Total 1400 


 


Balance -1300 














- Medications


Medications: 


 Current Medications





Dextrose (Dextrose 50% Inj)  0 ml IV STAT PRN; Protocol


   PRN Reason: Hypoglycemia Protocol


Dextrose (Glutose 15)  0 gm PO ONCE PRN; Protocol


   PRN Reason: Hypoglycemia Protocol


Enoxaparin Sodium (Lovenox)  30 mg SC DAILY [facility]


   PRN Reason: Protocol


   Last Admin: 03/19/18 08:25 Dose:  30 mg


Gabapentin (Neurontin)  600 mg PO BID [facility]


   Last Admin: 03/19/18 21:04 Dose:  600 mg


Glipizide (Glucotrol Xl)  5 mg PO BRK [facility]


   Last Admin: 03/19/18 08:24 Dose:  5 mg


Glucagon (Glucagen Diagnostic Kit)  0 mg IM STAT PRN; Protocol


   PRN Reason: Hypoglycemia Protocol


Piperacillin Sod/Tazobactam (Sod 3.375 gm/ Sodium Chloride)  100 mls @ 100 mls/

hr IVPB 0200,0800,1400,2000 [facility]


   PRN Reason: Protocol


   Last Admin: 03/19/18 20:30 Dose:  100 mls/hr


Insulin Detemir (Levemir)  40 units SC HS [facility]


   Last Admin: 03/19/18 21:50 Dose:  40 units


Insulin Human Lispro (Humalog)  0 units SC ACHS [facility]


   Last Admin: 03/19/18 21:48 Dose:  Not Given


Insulin Human Lispro (Humalog)  14 units SC AC [facility]


   Last Admin: 03/19/18 17:56 Dose:  Not Given


Pantoprazole Sodium (Protonix Inj)  40 mg IVP DAILY [facility]


   Last Admin: 03/19/18 08:25 Dose:  40 mg


Pioglitazone HCl (Actos)  30 mg PO DAILY [facility]


   Last Admin: 03/19/18 08:24 Dose:  30 mg











- Labs


Labs: 


 





 03/19/18 04:35 





 03/19/18 04:35 





 











PT  13.6 Seconds (9.8-13.1)  H  03/16/18  10:26    


 


INR  1.2  (0.9-1.2)   03/16/18  10:26    


 


APTT  32.5 Seconds (25.6-37.1)   03/16/18  10:26

## 2018-03-19 NOTE — CP.PCM.PN
Subjective





- Date & Time of Evaluation


Date of Evaluation: 03/19/18


Time of Evaluation: 08:00





- Subjective


Subjective: 





Podiatry Progress Note- Dr. Ascencio





67 year old male with AMS seen at bedside for left foot wound. Patient is AAO x 

2 at time of visit. Per nursing, patient is more alert today however still 

confuse at times. When asked, patient reports no acute overnight events. When 

asked patient denies nausea, fever, shortness of breath, chest pain or chills. 

He reports that he feels much better today. 





Objective





- Vital Signs/Intake and Output


Vital Signs (last 24 hours): 


 











Temp Pulse Resp BP Pulse Ox


 


 97.2 F L  85   13   138/77   100 


 


 03/19/18 08:00  03/19/18 08:00  03/19/18 08:00  03/19/18 08:00  03/19/18 08:00








Intake and Output: 


 











 03/19/18 03/19/18





 06:59 18:59


 


Intake Total 2000 


 


Output Total 1500 


 


Balance 500 














- Medications


Medications: 


 Current Medications





Dextrose (Dextrose 50% Inj)  0 ml IV STAT PRN; Protocol


   PRN Reason: Hypoglycemia Protocol


Dextrose (Glutose 15)  0 gm PO ONCE PRN; Protocol


   PRN Reason: Hypoglycemia Protocol


Enoxaparin Sodium (Lovenox)  30 mg SC DAILY [facility]


   PRN Reason: Protocol


   Last Admin: 03/19/18 08:25 Dose:  30 mg


Glipizide (Glucotrol Xl)  5 mg PO BRK [facility]


   Last Admin: 03/19/18 08:24 Dose:  5 mg


Glucagon (Glucagen Diagnostic Kit)  0 mg IM STAT PRN; Protocol


   PRN Reason: Hypoglycemia Protocol


Piperacillin Sod/Tazobactam (Sod 3.375 gm/ Sodium Chloride)  100 mls @ 100 mls/

hr IVPB 0200,0800,1400,2000 [facility]


   PRN Reason: Protocol


   Last Admin: 03/19/18 08:26 Dose:  100 mls/hr


Oral Electrolytes (Kcl 40meq/ 0.9% 1l)  1,000 mls @ 150 mls/hr IV .Q6H40M [facility]


   Stop: 03/19/18 11:12


   Last Admin: 03/19/18 00:35 Dose:  150 mls/hr


Insulin Detemir (Levemir)  40 units SC [facility]


   Last Admin: 03/18/18 22:17 Dose:  40 units


Insulin Human Lispro (Humalog)  0 units SC ACHS [facility]


   Last Admin: 03/19/18 06:31 Dose:  Not Given


Insulin Human Lispro (Humalog)  14 units SC AC [facility]


   Last Admin: 03/19/18 08:23 Dose:  14 units


Pantoprazole Sodium (Protonix Inj)  40 mg IVP DAILY [facility]


   Last Admin: 03/19/18 08:25 Dose:  40 mg


Pioglitazone HCl (Actos)  30 mg PO DAILY [facility]


   Last Admin: 03/19/18 08:24 Dose:  30 mg











- Labs


Labs: 


 





 03/19/18 04:35 





 03/19/18 04:35 





 











PT  13.6 Seconds (9.8-13.1)  H  03/16/18  10:26    


 


INR  1.2  (0.9-1.2)   03/16/18  10:26    


 


APTT  32.5 Seconds (25.6-37.1)   03/16/18  10:26    














- Constitutional


Appears: Well, Non-toxic, No Acute Distress





- Extremities Exam


Additional comments: 





VASC: LLE DP and PT pulses nonpalpable. RLE DP pulse weakly palpalbe 1/4, PT 

nonpalpable. CFT <3 seconds to all digits x10. Mild nonpitting edmea noted to 

left foot.





NEURO: Protective and gross sensation diminished





DERM: Necrotic eschar noted to dorsolateral left foot with periwound skin 

erosion - no fluctanance appreciated with palpation of the eschar. No periwound 

erythema, no malodor, no purulent drainage expressed. No other clinical signs 

of infection noted





ORTHO: With palpation, no pain elicited. LLE ankle joint ROM limited





- Neurological Exam


Neurological Exam: Alert, Awake





- Psychiatric Exam


Psychiatric exam: Normal Affect, Normal Mood





Assessment and Plan





- Assessment and Plan (Free Text)


Plan: 








Patient seen and evaluated at bedside


Afebrile, WBC 18.4 trending down


Plan discussed with attending Dr. Ascencio


Tib/fib xrays ordered and reviewed; negative for soft tissue emphysema


MRI shows no OM; Dorsal cellulitis with plantar foot myositis in trace fluid 

underlying plantarfascia though chronic fasciitis is not excluded at posterior 

plantar fascia


Wound dressed with gauze, ABD, kirlix


Multipodus boots placed b/l


No surgical intervention planned at this time


Continue IV abx per ID


Podiatry will continue to follow while patient in house

## 2018-03-19 NOTE — CP.CCUPN
CCU Subjective





- Physician Review


Subjective (Free Text): 





03/19/18 12:06


The patient was Seen/interviewed and examined by me at the bedside during ICU 

round, 


Medical records reviewed and Management issues were discussed and formulated 

with the house staff.


Events reviewed 


Pt less confused today, BP has been stable, no fever, electrolytes are improving

, also leukocytosis, endocrinologist managing his blood sugar, was seen by Dr Nicholas, cardiologist due to high TNI, does not have ACS, has been on antbiotics, 

empirically due to leukocytosis and ID following but no fever, cultures has 

been negative so far. 


CT head was also negative for any acute pathology, on 3/16.


Patient stable for transfer out of ICU





CCU Objective





- Vital Signs / Intake & Output


Vital Signs (Last 4 hours): 


Vital Signs











  Temp Pulse Resp BP Pulse Ox


 


 03/19/18 06:00   87  21  133/74  100


 


 03/19/18 04:00  99.0 F  95 H  21  127/72  100











Intake and Output (Last 8hrs): 


 Intake & Output











 03/18/18 03/19/18 03/19/18





 22:59 06:59 14:59


 


Intake Total 2500 1300 


 


Output Total  1500 


 


Balance 2500 -200 


 


Intake:   


 


  IV 2400 1200 


 


  Intake, Piggyback 100 100 


 


Output:   


 


  Urine  1500 


 


    Urethral (Mcgee)  1500 


 


Other:   


 


  # Bowel Movements 1  














- Physical Exam


Head: Positive for: Atraumatic


Pupils: Positive for: PERRL.  Negative for: Sluggish, Non-Reactive


Pharnyx: Positive for: Normal


Nose (Internal): Positive for: Normal Inspection


Neck: Positive for: Normal Range of Motion, Trachea Midline.  Negative for: 

Meningeal Signs, MIDLINE TENDERNESS, Paraspinal Tenderness, JVD, Lymphadenopathy

, Bruit, Other


Respiratory/Chest: Positive for: Clear to Auscultation, Good Air Exchange.  

Negative for: Respiratory Distress, Accessory Muscle Use


Cardiovascular: Positive for: Regular Rate and Rhythm, Normal S1, S2, Peripheal 

Pulses Present.  Negative for: Murmurs


Abdomen: Positive for: Normal Bowel Sounds.  Negative for: Tenderness, 

Distention





- Medications


Active Medications: 


Active Medications











Generic Name Dose Route Start Last Admin





  Trade Name Freq  PRN Reason Stop Dose Admin


 


Dextrose  0 ml  03/16/18 10:50  





  Dextrose 50% Inj  IV   





  STAT PRN   





  Hypoglycemia Protocol   





  Protocol   


 


Dextrose  0 gm  03/16/18 10:50  





  Glutose 15  PO   





  ONCE PRN   





  Hypoglycemia Protocol   





  Protocol   


 


Enoxaparin Sodium  30 mg  03/16/18 14:00  03/18/18 09:49





  Lovenox  SC   30 mg





  DAILY [facility]   Administration





  Protocol   


 


Glipizide  5 mg  03/18/18 08:30  03/18/18 10:05





  Glucotrol Xl  PO   Not Given





  BRK [facility]   


 


Glucagon  0 mg  03/16/18 10:50  





  Glucagen Diagnostic Kit  IM   





  STAT PRN   





  Hypoglycemia Protocol   





  Protocol   


 


Piperacillin Sod/Tazobactam  100 mls @ 100 mls/hr  03/17/18 02:00  03/19/18 02:

25





  Sod 3.375 gm/ Sodium Chloride  IVPB   100 mls/hr





  0200,0800,1400,2000 [facility]   Administration





  Protocol   


 


Oral Electrolytes  1,000 mls @ 150 mls/hr  03/18/18 11:15  03/19/18 00:35





  Kcl 40meq/ 0.9% 1l  IV  03/19/18 11:12  150 mls/hr





  .Q6H40M [facility]   Administration


 


Insulin Detemir  40 units  03/18/18 22:00  03/18/18 22:17





  Levemir  SC   40 units





  HS [facility]   Administration


 


Insulin Human Lispro  0 units  03/17/18 11:30  03/19/18 06:31





  Humalog  SC   Not Given





  ACHS [facility]   


 


Insulin Human Lispro  14 units  03/18/18 11:30  03/18/18 16:46





  Humalog  SC   14 units





  AC [facility]   Administration


 


Pantoprazole Sodium  40 mg  03/17/18 09:00  03/18/18 09:50





  Protonix Inj  IVP   40 mg





  DAILY [facility]   Administration


 


Pioglitazone HCl  30 mg  03/18/18 09:00  03/18/18 10:04





  Actos  PO   Not Given





  DAILY [facility]   














- Patient Studies


Lab Studies: 


 Microbiology Studies











 03/16/18 08:00 MRSA Culture (Admit) - Final





 Naris    MRSA NOT DETECTED


 


 03/16/18 11:54 Blood Culture - Preliminary





 Blood    NO GROWTH AFTER 48 HOURS


 


 03/16/18 10:26 Blood Culture - Preliminary





 Blood    NO GROWTH AFTER 48 HOURS








 Lab Studies











  03/19/18 03/19/18 03/19/18 Range/Units





  06:17 04:35 04:35 


 


WBC    18.4 H  (4.8-10.8)  K/uL


 


RBC    3.95 L  (4.40-5.90)  Mil/uL


 


Hgb    10.5 L  (12.0-18.0)  g/dL


 


Hct    32.0 L  (35.0-51.0)  %


 


MCV    80.9  (80.0-94.0)  fl


 


MCH    26.6 L  (27.0-31.0)  pg


 


MCHC    32.9 L  (33.0-37.0)  g/dL


 


RDW    15.9 H  (11.5-14.5)  %


 


Plt Count    314  (130-400)  K/uL


 


MPV    10.9  (7.2-11.7)  fl


 


Neut % (Auto)    74.8  (50.0-75.0)  %


 


Lymph % (Auto)    14.4 L  (20.0-40.0)  %


 


Mono % (Auto)    8.3  (0.0-10.0)  %


 


Eos % (Auto)    2.0  (0.0-4.0)  %


 


Baso % (Auto)    0.5  (0.0-2.0)  %


 


Neut # (Auto)    13.8 H  (1.8-7.0)  K/uL


 


Lymph # (Auto)    2.6  (1.0-4.3)  K/uL


 


Mono # (Auto)    1.5 H  (0.0-0.8)  K/uL


 


Eos # (Auto)    0.4  (0.0-0.7)  K/uL


 


Baso # (Auto)    0.1  (0.0-0.2)  K/uL


 


Sodium   145   (132-148)  mmol/l


 


Potassium   3.4 L   (3.6-5.0)  MMOL/L


 


Chloride   111 H   ()  mmol/L


 


Carbon Dioxide   22   (22-30)  mmol/L


 


Anion Gap   15   (10-20)  


 


BUN   17   (9-20)  mg/dl


 


Creatinine   0.7 L   (0.8-1.5)  mg/dl


 


Est GFR (African Amer)   > 60   


 


Est GFR (Non-Af Amer)   > 60   


 


POC Glucose (mg/dL)  226 H    ()  mg/dL


 


Random Glucose   299 H   ()  mg/dL


 


Calcium   8.1 L   (8.4-10.2)  mg/dL


 


Total Bilirubin   0.6   (0.2-1.3)  mg/dl


 


AST   21   (17-59)  U/L


 


ALT   26   (21-72)  U/L


 


Alkaline Phosphatase   65   ()  U/L


 


Total Protein   6.1 L   (6.3-8.2)  G/DL


 


Albumin   2.6 L   (3.5-5.0)  g/dL


 


Globulin   3.6   (2.2-3.9)  gm/dL


 


Albumin/Globulin Ratio   0.7 L   (1.0-2.1)  














  03/18/18 03/18/18 03/18/18 Range/Units





  22:15 16:34 11:36 


 


WBC     (4.8-10.8)  K/uL


 


RBC     (4.40-5.90)  Mil/uL


 


Hgb     (12.0-18.0)  g/dL


 


Hct     (35.0-51.0)  %


 


MCV     (80.0-94.0)  fl


 


MCH     (27.0-31.0)  pg


 


MCHC     (33.0-37.0)  g/dL


 


RDW     (11.5-14.5)  %


 


Plt Count     (130-400)  K/uL


 


MPV     (7.2-11.7)  fl


 


Neut % (Auto)     (50.0-75.0)  %


 


Lymph % (Auto)     (20.0-40.0)  %


 


Mono % (Auto)     (0.0-10.0)  %


 


Eos % (Auto)     (0.0-4.0)  %


 


Baso % (Auto)     (0.0-2.0)  %


 


Neut # (Auto)     (1.8-7.0)  K/uL


 


Lymph # (Auto)     (1.0-4.3)  K/uL


 


Mono # (Auto)     (0.0-0.8)  K/uL


 


Eos # (Auto)     (0.0-0.7)  K/uL


 


Baso # (Auto)     (0.0-0.2)  K/uL


 


Sodium     (132-148)  mmol/l


 


Potassium     (3.6-5.0)  MMOL/L


 


Chloride     ()  mmol/L


 


Carbon Dioxide     (22-30)  mmol/L


 


Anion Gap     (10-20)  


 


BUN     (9-20)  mg/dl


 


Creatinine     (0.8-1.5)  mg/dl


 


Est GFR (African Amer)     


 


Est GFR (Non-Af Amer)     


 


POC Glucose (mg/dL)  245 H  242 H  181 H  ()  mg/dL


 


Random Glucose     ()  mg/dL


 


Calcium     (8.4-10.2)  mg/dL


 


Total Bilirubin     (0.2-1.3)  mg/dl


 


AST     (17-59)  U/L


 


ALT     (21-72)  U/L


 


Alkaline Phosphatase     ()  U/L


 


Total Protein     (6.3-8.2)  G/DL


 


Albumin     (3.5-5.0)  g/dL


 


Globulin     (2.2-3.9)  gm/dL


 


Albumin/Globulin Ratio     (1.0-2.1)  








 Laboratory Results - last 24 hr











  03/18/18 03/18/18 03/18/18





  11:36 16:34 22:15


 


WBC   


 


RBC   


 


Hgb   


 


Hct   


 


MCV   


 


MCH   


 


MCHC   


 


RDW   


 


Plt Count   


 


MPV   


 


Neut % (Auto)   


 


Lymph % (Auto)   


 


Mono % (Auto)   


 


Eos % (Auto)   


 


Baso % (Auto)   


 


Neut # (Auto)   


 


Lymph # (Auto)   


 


Mono # (Auto)   


 


Eos # (Auto)   


 


Baso # (Auto)   


 


Sodium   


 


Potassium   


 


Chloride   


 


Carbon Dioxide   


 


Anion Gap   


 


BUN   


 


Creatinine   


 


Est GFR ( Amer)   


 


Est GFR (Non-Af Amer)   


 


POC Glucose (mg/dL)  181 H  242 H  245 H


 


Random Glucose   


 


Calcium   


 


Total Bilirubin   


 


AST   


 


ALT   


 


Alkaline Phosphatase   


 


Total Protein   


 


Albumin   


 


Globulin   


 


Albumin/Globulin Ratio   














  03/19/18 03/19/18 03/19/18





  04:35 04:35 06:17


 


WBC  18.4 H  


 


RBC  3.95 L  


 


Hgb  10.5 L  


 


Hct  32.0 L  


 


MCV  80.9  


 


MCH  26.6 L  


 


MCHC  32.9 L  


 


RDW  15.9 H  


 


Plt Count  314  


 


MPV  10.9  


 


Neut % (Auto)  74.8  


 


Lymph % (Auto)  14.4 L  


 


Mono % (Auto)  8.3  


 


Eos % (Auto)  2.0  


 


Baso % (Auto)  0.5  


 


Neut # (Auto)  13.8 H  


 


Lymph # (Auto)  2.6  


 


Mono # (Auto)  1.5 H  


 


Eos # (Auto)  0.4  


 


Baso # (Auto)  0.1  


 


Sodium   145 


 


Potassium   3.4 L 


 


Chloride   111 H 


 


Carbon Dioxide   22 


 


Anion Gap   15 


 


BUN   17 


 


Creatinine   0.7 L 


 


Est GFR ( Amer)   > 60 


 


Est GFR (Non-Af Amer)   > 60 


 


POC Glucose (mg/dL)    226 H


 


Random Glucose   299 H 


 


Calcium   8.1 L 


 


Total Bilirubin   0.6 


 


AST   21 


 


ALT   26 


 


Alkaline Phosphatase   65 


 


Total Protein   6.1 L 


 


Albumin   2.6 L 


 


Globulin   3.6 


 


Albumin/Globulin Ratio   0.7 L 











Fingerstick Blood Sugar Results: 226





Critical Care Progress Note





- Extremities/Vascular


Does the Patient have a Central Venous Catheter?: No


Does the Patient need a Central Venous Catheter?: No


Does the Patient have a Mcgee Catheter?: No


Does the Patient need a Mcgee Catheter?: No





- Nutrition


Nutrition: 


 Nutrition











 Category Date Time Status


 


 Consistent Carbohydrate [DIET] Diets  03/17/18 Lunch Active














Assessment/Plan


(1) Altered mental status


Current Visit: Yes   Status: Acute   Priority: High   Comment: 


AMS secondary to Hyperosmolar Hyperglycemic state, hypernatremia


Resolving


A Gap is closing,


Off insulin GGT


CT head was negative


Electrolytes improving   





(2) Sepsis


Current Visit: Yes   Status: Acute   Priority: High   Comment: 


Cultures negative so far


Continue IV Zosyn


Await MRI foot


No surgical intervention planned as per Podiatry   





(3) Hypokalemia


Current Visit: Yes   Status: Acute   Priority: Medium   Comment: 


due to IVF, insulin


Will suplement

## 2018-03-19 NOTE — PN
ENDOCRINOLOGY FOLLOWUP NOTE



DATE:



LOCATION:  In room 665.



SUBJECTIVE:  This is a 67-year-old male with recent uncontrolled type 2

insulin-requiring diabetes presenting here with diabetic ketoacidosis and

dehydration and has since then improved clinically and metabolically as

noted thereof.  His glucose levels have ranged from 153 to 226 and 85

mg/dL.  The latest chemistry showed a BUN of 17, sodium 145, potassium 3.4,

chloride 111, CO2 of 22, glucose 299, and creatinine 0.7.  The bedtime

glucose was 245.  So, at this time, we will modify once again his basal and

bolus insulin regimen and increase the Humalog to 14 units subcutaneously

t.i.d. before meals to start at dinnertime today as ordered.  We will also

increase the basal insulin to 40 units of Levemir given at bedtime to start

tonight as ordered.  We will continue the low-dose correction scale using

Humalog insulin as ordered.  We will titrate incremental as indicated to

optimize metabolic control.  We will follow and advise accordingly.





__________________________________________

Bernie Collins MD





DD:  03/19/2018 19:16:53

DT:  03/19/2018 19:18:03

Job # [id number]

## 2018-03-19 NOTE — CP.PCM.PN
Subjective





- Date & Time of Evaluation


Date of Evaluation: 03/19/18


Time of Evaluation: 09:00





- Subjective


Subjective: 





more alert


cultures so far neg


await MRI foot








Objective





- Vital Signs/Intake and Output


Vital Signs (last 24 hours): 


 











Temp Pulse Resp BP Pulse Ox


 


 97.2 F L  92 H  24   120/64   100 


 


 03/19/18 08:00  03/19/18 10:00  03/19/18 10:00  03/19/18 10:00  03/19/18 10:00








Intake and Output: 


 











 03/19/18 03/19/18





 06:59 18:59


 


Intake Total 2000 


 


Output Total 1500 


 


Balance 500 














- Medications


Medications: 


 Current Medications





Dextrose (Dextrose 50% Inj)  0 ml IV STAT PRN; Protocol


   PRN Reason: Hypoglycemia Protocol


Dextrose (Glutose 15)  0 gm PO ONCE PRN; Protocol


   PRN Reason: Hypoglycemia Protocol


Enoxaparin Sodium (Lovenox)  30 mg SC [facility]ILY [facility]


   PRN Reason: Protocol


   Last Admin: 03/19/18 08:25 Dose:  30 mg


Glipizide (Glucotrol Xl)  5 mg PO BRK [facility]


   Last Admin: 03/19/18 08:24 Dose:  5 mg


Glucagon (Glucagen Diagnostic Kit)  0 mg IM STAT PRN; Protocol


   PRN Reason: Hypoglycemia Protocol


Piperacillin Sod/Tazobactam (Sod 3.375 gm/ Sodium Chloride)  100 mls @ 100 mls/

hr IVPB 0200,0800,1400,2000 [facility]


   PRN Reason: Protocol


   Last Admin: 03/19/18 08:26 Dose:  100 mls/hr


Oral Electrolytes (Kcl 40meq/ 0.9% 1l)  1,000 mls @ 75 mls/hr IV .[id number] [facility]


   Stop: 03/19/18 11:12


Insulin Detemir (Levemir)  40 units SC HS [facility]


   Last Admin: 03/18/18 22:17 Dose:  40 units


Insulin Human Lispro (Humalog)  0 units SC ACHS [facility]


   Last Admin: 03/19/18 06:31 Dose:  Not Given


Insulin Human Lispro (Humalog)  14 units SC AC [facility]


   Last Admin: 03/19/18 08:23 Dose:  14 units


Pantoprazole Sodium (Protonix Inj)  40 mg IVP DAILY [facility]


   Last Admin: 03/19/18 08:25 Dose:  40 mg


Pioglitazone HCl (Actos)  30 mg PO DAILY [facility]


   Last Admin: 03/19/18 08:24 Dose:  30 mg











- Labs


Labs: 


 





 03/19/18 04:35 





 03/19/18 04:35 





 











PT  13.6 Seconds (9.8-13.1)  H  03/16/18  10:26    


 


INR  1.2  (0.9-1.2)   03/16/18  10:26    


 


APTT  32.5 Seconds (25.6-37.1)   03/16/18  10:26    














- Constitutional


Appears: Non-toxic, Cachectic, Chronically Ill





- Head Exam


Head Exam: NORMOCEPHALIC





- Eye Exam


Eye Exam: PERRL





- ENT Exam


ENT Exam: Mucous Membranes Dry





- Neck Exam


Neck Exam: absent: Lymphadenopathy





- Respiratory Exam


Respiratory Exam: Decreased Breath Sounds





- Cardiovascular Exam


Cardiovascular Exam: REGULAR RHYTHM





- GI/Abdominal Exam


GI & Abdominal Exam: Distended, Soft





- Rectal Exam


Rectal Exam: Deferred





-  Exam


 Exam: NORMAL INSPECTION





Assessment and Plan





- Assessment and Plan (Free Text)


Assessment: 





s/p dka, sepsis, infected foot


await MRI

## 2018-03-20 LAB
ALBUMIN SERPL-MCNC: 2.5 G/DL (ref 3.5–5)
ALBUMIN/GLOB SERPL: 0.7 {RATIO} (ref 1–2.1)
ALT SERPL-CCNC: 25 U/L (ref 21–72)
AST SERPL-CCNC: 15 U/L (ref 17–59)
BUN SERPL-MCNC: 14 MG/DL (ref 9–20)
CALCIUM SERPL-MCNC: 7.9 MG/DL (ref 8.4–10.2)
ERYTHROCYTE [DISTWIDTH] IN BLOOD BY AUTOMATED COUNT: 16.2 % (ref 11.5–14.5)
GFR NON-AFRICAN AMERICAN: > 60
HGB BLD-MCNC: 10.1 G/DL (ref 12–18)
MCH RBC QN AUTO: 27.1 PG (ref 27–31)
MCHC RBC AUTO-ENTMCNC: 33.8 G/DL (ref 33–37)
MCV RBC AUTO: 80.1 FL (ref 80–94)
PLATELET # BLD: 289 K/UL (ref 130–400)
RBC # BLD AUTO: 3.71 MIL/UL (ref 4.4–5.9)
URATE SERPL-MCNC: 1.3 MG/DL (ref 3.5–8.5)
WBC # BLD AUTO: 16.9 K/UL (ref 4.8–10.8)

## 2018-03-20 RX ADMIN — GLIPIZIDE SCH MG: 5 TABLET, EXTENDED RELEASE ORAL at 09:14

## 2018-03-20 RX ADMIN — WATER SCH MLS/HR: 1 INJECTION INTRAMUSCULAR; INTRAVENOUS; SUBCUTANEOUS at 01:53

## 2018-03-20 RX ADMIN — INSULIN LISPRO SCH: 100 INJECTION, SOLUTION INTRAVENOUS; SUBCUTANEOUS at 06:46

## 2018-03-20 RX ADMIN — INSULIN LISPRO SCH: 100 INJECTION, SOLUTION INTRAVENOUS; SUBCUTANEOUS at 11:30

## 2018-03-20 RX ADMIN — INSULIN LISPRO SCH UNITS: 100 INJECTION, SOLUTION INTRAVENOUS; SUBCUTANEOUS at 21:47

## 2018-03-20 RX ADMIN — ENOXAPARIN SODIUM SCH MG: 30 INJECTION SUBCUTANEOUS at 09:15

## 2018-03-20 RX ADMIN — WATER SCH MLS/HR: 1 INJECTION INTRAMUSCULAR; INTRAVENOUS; SUBCUTANEOUS at 12:01

## 2018-03-20 RX ADMIN — INSULIN DETEMIR SCH UNITS: 100 INJECTION, SOLUTION SUBCUTANEOUS at 21:50

## 2018-03-20 RX ADMIN — INSULIN LISPRO SCH UNITS: 100 INJECTION, SOLUTION INTRAVENOUS; SUBCUTANEOUS at 08:00

## 2018-03-20 RX ADMIN — WATER SCH MLS/HR: 1 INJECTION INTRAMUSCULAR; INTRAVENOUS; SUBCUTANEOUS at 20:11

## 2018-03-20 RX ADMIN — WATER SCH MLS/HR: 1 INJECTION INTRAMUSCULAR; INTRAVENOUS; SUBCUTANEOUS at 16:39

## 2018-03-20 NOTE — CP.PCM.PN
Subjective





- Date & Time of Evaluation


Date of Evaluation: 03/20/18


Time of Evaluation: 10:12





- Subjective


Subjective: 





Podiatry Progress Note- Dr. Ascencio








67 year old male admitted for sepsis and AMS seen at bedside this morning. 

Patient is seen resting comfortably in bed and in NAD at bedside during 

visitation. Per nursing, patient is more alert and less confuse today.





Objective





- Vital Signs/Intake and Output


Vital Signs (last 24 hours): 


 











Temp Pulse Resp BP Pulse Ox


 


 100.6 F H  86   20   123/69   98 


 


 03/20/18 07:59  03/20/18 07:59  03/20/18 07:59  03/20/18 07:59  03/20/18 07:59








Intake and Output: 


 











 03/20/18 03/20/18





 06:59 18:59


 


Intake Total 1000 


 


Output Total 700 


 


Balance 300 














- Medications


Medications: 


 Current Medications





Dextrose (Dextrose 50% Inj)  0 ml IV STAT PRN; Protocol


   PRN Reason: Hypoglycemia Protocol


Dextrose (Glutose 15)  0 gm PO ONCE PRN; Protocol


   PRN Reason: Hypoglycemia Protocol


Enoxaparin Sodium (Lovenox)  30 mg SC DAILY [facility]


   PRN Reason: Protocol


   Last Admin: 03/20/18 09:15 Dose:  30 mg


Gabapentin (Neurontin)  600 mg PO BID [facility]


   Last Admin: 03/20/18 09:14 Dose:  600 mg


Glipizide (Glucotrol Xl)  5 mg PO BRK [facility]


   Last Admin: 03/20/18 09:14 Dose:  5 mg


Glucagon (Glucagen Diagnostic Kit)  0 mg IM STAT PRN; Protocol


   PRN Reason: Hypoglycemia Protocol


Piperacillin Sod/Tazobactam (Sod 3.375 gm/ Sodium Chloride)  100 mls @ 100 mls/

hr IVPB 0200,0800,1400,2000 [facility]


   PRN Reason: Protocol


   Last Admin: 03/20/18 01:53 Dose:  100 mls/hr


Insulin Detemir (Levemir)  40 units SC HS [facility]


   Last Admin: 03/19/18 21:50 Dose:  40 units


Insulin Human Lispro (Humalog)  0 units SC ACHS [facility]


   Last Admin: 03/20/18 06:46 Dose:  Not Given


Insulin Human Lispro (Humalog)  14 units SC AC [facility]


   Last Admin: 03/20/18 08:00 Dose:  14 units


Pantoprazole Sodium (Protonix Inj)  40 mg IVP DAILY [facility]


   Last Admin: 03/20/18 09:17 Dose:  40 mg


Pioglitazone HCl (Actos)  30 mg PO DAILY [facility]


   Last Admin: 03/20/18 09:14 Dose:  30 mg











- Labs


Labs: 


 





 03/20/18 05:55 





 03/20/18 05:55 





 











PT  13.6 Seconds (9.8-13.1)  H  03/16/18  10:26    


 


INR  1.2  (0.9-1.2)   03/16/18  10:26    


 


APTT  32.5 Seconds (25.6-37.1)   03/16/18  10:26    














- Constitutional


Appears: Well, Non-toxic, No Acute Distress





- Extremities Exam


Additional comments: 








VASC: LLE DP and PT pulses nonpalpable. RLE DP pulse weakly palpalbe 1/4, PT 

nonpalpable. CFT <3 seconds to all digits x10. Mild nonpitting edmea noted to 

left foot, most prominent on the plantar aspect of the medial arch





NEURO: Protective and gross sensation diminished





DERM: Necrotic eschar noted to dorsolateral left foot with periwound skin 

erosion - no fluctanance appreciated with palpation of the eschar. No periwound 

erythema, no malodor, no purulent drainage expressed. No other clinical signs 

of infection noted





ORTHO: With palpation, no pain elicited. LLE ankle joint ROM limited





- Psychiatric Exam


Psychiatric exam: Normal Affect, Normal Mood





Assessment and Plan





- Assessment and Plan (Free Text)


Assessment: 





67M with unstageable necrotic eschar to dorsum of left foot, r/o source sepsis


Plan: 





Patient seen and evaluated at bedside


Afebrile, WBC 16.9


Plan discussed with attending Dr. Ascencio


Tib/fib xrays ordered and reviewed; negative for soft tissue emphysema


MRI shows no OM; Dorsal cellulitis with plantar foot myositis in trace fluid 

underlying plantarfascia though chronic fasciitis is not excluded at posterior 

plantar fascia


Wound dressed with gauze, ABD, kirlix


Multipodus boots placed b/l


No surgical intervention planned at this time


Continue IV abx per ID


Podiatry will continue to follow while patient in house

## 2018-03-20 NOTE — CP.PCM.PN
<[place]re [place] - Last Filed: 03/20/18 13:02>





Subjective





- Date & Time of Evaluation


Date of Evaluation: 03/20/18


Time of Evaluation: 10:35





- Subjective


Subjective: 





Stable. Seen at bedside, alert and able to establish a conversation. Denies CP, 

SOB, abd pain, dizziness. Patient states he was using his insulin at home but 

admits eating sweets. Had 1 episode of fever this morning, low grade. Denies 

cough, changes in urination or stools.





Objective





- Vital Signs/Intake and Output


Vital Signs (last 24 hours): 


 











Temp Pulse Resp BP Pulse Ox


 


 100.6 F H  86   20   123/69   98 


 


 03/20/18 07:59  03/20/18 07:59  03/20/18 07:59  03/20/18 07:59  03/20/18 07:59








Intake and Output: 


 











 03/20/18 03/20/18





 06:59 18:59


 


Intake Total 1000 


 


Output Total 700 


 


Balance 300 














- Medications


Medications: 


 Current Medications





Dextrose (Dextrose 50% Inj)  0 ml IV STAT PRN; Protocol


   PRN Reason: Hypoglycemia Protocol


Dextrose (Glutose 15)  0 gm PO ONCE PRN; Protocol


   PRN Reason: Hypoglycemia Protocol


Enoxaparin Sodium (Lovenox)  30 mg SC DAILY [facility]


   PRN Reason: Protocol


   Last Admin: 03/20/18 09:15 Dose:  30 mg


Gabapentin (Neurontin)  600 mg PO BID [facility]


   Last Admin: 03/20/18 09:14 Dose:  600 mg


Glucagon (Glucagen Diagnostic Kit)  0 mg IM STAT PRN; Protocol


   PRN Reason: Hypoglycemia Protocol


Piperacillin Sod/Tazobactam (Sod 3.375 gm/ Sodium Chloride)  100 mls @ 100 mls/

hr IVPB 0200,0800,1400,2000 [facility]


   PRN Reason: Protocol


   Last Admin: 03/20/18 12:01 Dose:  100 mls/hr


Insulin Detemir (Levemir)  40 units SC HS [facility]


   Last Admin: 03/19/18 21:50 Dose:  40 units


Insulin Human Lispro (Humalog)  0 units SC ACHS [facility]


   Last Admin: 03/20/18 06:46 Dose:  Not Given


Insulin Human Lispro (Humalog)  14 units SC AC [facility]


   Last Admin: 03/20/18 08:00 Dose:  14 units


Pantoprazole Sodium (Protonix Inj)  40 mg IVP DAILY [facility]


   Last Admin: 03/20/18 09:17 Dose:  40 mg


Pioglitazone HCl (Actos)  30 mg PO DAILY [facility]


   Last Admin: 03/20/18 09:14 Dose:  30 mg


Tramadol HCl (Ultram)  50 mg PO Q6 PRN


   PRN Reason: Pain, moderate (4-7)


   Last Admin: 03/20/18 12:16 Dose:  50 mg











- Labs


Labs: 


 





 03/20/18 05:55 





 03/20/18 05:55 





 











PT  13.6 Seconds (9.8-13.1)  H  03/16/18  10:26    


 


INR  1.2  (0.9-1.2)   03/16/18  10:26    


 


APTT  32.5 Seconds (25.6-37.1)   03/16/18  10:26    














- Constitutional


Appears: Non-toxic, No Acute Distress, Chronically Ill





- Eye Exam


Eye Exam: EOMI, PERRL





- ENT Exam


ENT Exam: Mucous Membranes Moist





- Respiratory Exam


Respiratory Exam: Clear to Ausculation Bilateral, NORMAL BREATHING PATTERN.  

absent: Decreased Breath Sounds, Rales, Wheezes, Respiratory Distress





- Cardiovascular Exam


Cardiovascular Exam: REGULAR RHYTHM, +S1, +S2.  absent: Gallop





- GI/Abdominal Exam


GI & Abdominal Exam: Soft, Normal Bowel Sounds.  absent: Guarding, Rigid, 

Tenderness, Rebound





- Extremities Exam


Extremities Exam: Normal Capillary Refill.  absent: Calf Tenderness, Pedal Edema

, Tenderness





- Back Exam


Back Exam: absent: CVA tenderness (L), CVA tenderness (R)





- Neurological Exam


Neurological Exam: Alert, Awake, Oriented x3





- Psychiatric Exam


Psychiatric exam: Normal Affect, Normal Mood





- Skin


Skin Exam: Normal Color, Warm





Assessment and Plan





- Assessment and Plan (Free Text)


Assessment: 





DKA resolved. C/W [place] as per Endocrinology. Consult appreciated


Suspected sepsis improving. Low grade fever today. All Cx no growth so far. C/W 

IV Zosyn


ID consult appreciated


Resend for cultures if fever continues


L/foot ulcer. Pain controlled. Podiatry consult appreciated. C/W Wound care. No 

OM on MRI but + for cellulitis/myositis








<Gunner Valdez - Last Filed: 03/20/18 14:58>





Objective





- Vital Signs/Intake and Output


Vital Signs (last 24 hours): 


 











Temp Pulse Resp BP Pulse Ox


 


 100.6 F H  63   20   123/69   98 


 


 03/20/18 07:59  03/20/18 13:01  03/20/18 07:59  03/20/18 07:59  03/20/18 07:59








Intake and Output: 


 











 03/20/18 03/20/18





 06:59 18:59


 


Intake Total 1000 


 


Output Total 700 


 


Balance 300 














- Medications


Medications: 


 Current Medications





Dextrose (Dextrose 50% Inj)  0 ml IV STAT PRN; Protocol


   PRN Reason: Hypoglycemia Protocol


Dextrose (Glutose 15)  0 gm PO ONCE PRN; Protocol


   PRN Reason: Hypoglycemia Protocol


Enoxaparin Sodium (Lovenox)  30 mg SC DAILY [facility]


   PRN Reason: Protocol


   Last Admin: 03/20/18 09:15 Dose:  30 mg


Gabapentin (Neurontin)  600 mg PO BID [facility]


   Last Admin: 03/20/18 09:14 Dose:  600 mg


Glucagon (Glucagen Diagnostic Kit)  0 mg IM STAT PRN; Protocol


   PRN Reason: Hypoglycemia Protocol


Piperacillin Sod/Tazobactam (Sod 3.375 gm/ Sodium Chloride)  100 mls @ 100 mls/

hr IVPB 0200,0800,1400,2000 [facility]


   PRN Reason: Protocol


   Last Admin: 03/20/18 12:01 Dose:  100 mls/hr


Insulin Detemir (Levemir)  40 units SC HS [facility]


   Last Admin: 03/19/18 21:50 Dose:  40 units


Insulin Human Lispro (Humalog)  0 units SC ACHS [facility]


   Last Admin: 03/20/18 11:30 Dose:  Not Given


Insulin Human Lispro (Humalog)  14 units SC AC [facility]


   Last Admin: 03/20/18 11:30 Dose:  Not Given


Pantoprazole Sodium (Protonix Inj)  40 mg IVP DAILY [facility]


   Last Admin: 03/20/18 09:17 Dose:  40 mg


Pioglitazone HCl (Actos)  30 mg PO DAILY [facility]


   Last Admin: 03/20/18 09:14 Dose:  30 mg


Tramadol HCl (Ultram)  50 mg PO Q6 PRN


   PRN Reason: Pain, moderate (4-7)


   Last Admin: 03/20/18 12:16 Dose:  50 mg











- Labs


Labs: 


 





 03/20/18 05:55 





 03/20/18 05:55 





 











PT  13.6 Seconds (9.8-13.1)  H  03/16/18  10:26    


 


INR  1.2  (0.9-1.2)   03/16/18  10:26    


 


APTT  32.5 Seconds (25.6-37.1)   03/16/18  10:26    














Assessment and Plan





- Assessment and Plan (Free Text)


Plan: 





saw pt and discussed plan with resident, agree with above

## 2018-03-21 ENCOUNTER — HOSPITAL ENCOUNTER (INPATIENT)
Dept: HOSPITAL 14 - H.TCU | Age: 68
LOS: 9 days | Discharge: ANOTHER HOSPITAL | DRG: 872 | End: 2018-03-30
Attending: FAMILY MEDICINE | Admitting: FAMILY MEDICINE
Payer: COMMERCIAL

## 2018-03-21 VITALS — BODY MASS INDEX: 22.7 KG/M2

## 2018-03-21 VITALS
OXYGEN SATURATION: 97 % | DIASTOLIC BLOOD PRESSURE: 65 MMHG | TEMPERATURE: 98.9 F | SYSTOLIC BLOOD PRESSURE: 119 MMHG | HEART RATE: 87 BPM

## 2018-03-21 DIAGNOSIS — E11.649: ICD-10-CM

## 2018-03-21 DIAGNOSIS — E11.65: ICD-10-CM

## 2018-03-21 DIAGNOSIS — Z79.4: ICD-10-CM

## 2018-03-21 DIAGNOSIS — A41.9: Primary | ICD-10-CM

## 2018-03-21 DIAGNOSIS — L03.116: ICD-10-CM

## 2018-03-21 DIAGNOSIS — M60.9: ICD-10-CM

## 2018-03-21 DIAGNOSIS — D64.9: ICD-10-CM

## 2018-03-21 DIAGNOSIS — E11.621: ICD-10-CM

## 2018-03-21 LAB
BUN SERPL-MCNC: 12 MG/DL (ref 9–20)
CALCIUM SERPL-MCNC: 8.2 MG/DL (ref 8.4–10.2)
ERYTHROCYTE [DISTWIDTH] IN BLOOD BY AUTOMATED COUNT: 15.8 % (ref 11.5–14.5)
GFR NON-AFRICAN AMERICAN: > 60
HGB BLD-MCNC: 9.7 G/DL (ref 12–18)
MCH RBC QN AUTO: 26.6 PG (ref 27–31)
MCHC RBC AUTO-ENTMCNC: 32.7 G/DL (ref 33–37)
MCV RBC AUTO: 81.2 FL (ref 80–94)
PLATELET # BLD: 309 K/UL (ref 130–400)
RBC # BLD AUTO: 3.66 MIL/UL (ref 4.4–5.9)
WBC # BLD AUTO: 14.3 K/UL (ref 4.8–10.8)

## 2018-03-21 PROCEDURE — F08Z1FZ DRESSING TECHNIQUES TREATMENT USING ASSISTIVE, ADAPTIVE, SUPPORTIVE OR PROTECTIVE EQUIPMENT: ICD-10-PCS | Performed by: FAMILY MEDICINE

## 2018-03-21 PROCEDURE — F07L6FZ THERAPEUTIC EXERCISE TREATMENT OF MUSCULOSKELETAL SYSTEM - LOWER BACK / LOWER EXTREMITY USING ASSISTIVE, ADAPTIVE, SUPPORTIVE OR PROTECTIVE EQUIPMENT: ICD-10-PCS | Performed by: FAMILY MEDICINE

## 2018-03-21 PROCEDURE — F08Z0FZ BATHING/SHOWERING TECHNIQUES TREATMENT USING ASSISTIVE, ADAPTIVE, SUPPORTIVE OR PROTECTIVE EQUIPMENT: ICD-10-PCS | Performed by: FAMILY MEDICINE

## 2018-03-21 PROCEDURE — F07Z9FZ GAIT TRAINING/FUNCTIONAL AMBULATION TREATMENT USING ASSISTIVE, ADAPTIVE, SUPPORTIVE OR PROTECTIVE EQUIPMENT: ICD-10-PCS | Performed by: FAMILY MEDICINE

## 2018-03-21 RX ADMIN — INSULIN LISPRO SCH: 100 INJECTION, SOLUTION INTRAVENOUS; SUBCUTANEOUS at 16:42

## 2018-03-21 RX ADMIN — INSULIN LISPRO SCH: 100 INJECTION, SOLUTION INTRAVENOUS; SUBCUTANEOUS at 07:30

## 2018-03-21 RX ADMIN — INSULIN LISPRO SCH: 100 INJECTION, SOLUTION INTRAVENOUS; SUBCUTANEOUS at 11:30

## 2018-03-21 RX ADMIN — WATER SCH MLS/HR: 1 INJECTION INTRAMUSCULAR; INTRAVENOUS; SUBCUTANEOUS at 09:43

## 2018-03-21 RX ADMIN — WATER SCH MLS/HR: 1 INJECTION INTRAMUSCULAR; INTRAVENOUS; SUBCUTANEOUS at 02:01

## 2018-03-21 RX ADMIN — INSULIN LISPRO SCH: 100 INJECTION, SOLUTION INTRAVENOUS; SUBCUTANEOUS at 21:06

## 2018-03-21 RX ADMIN — INSULIN LISPRO SCH UNITS: 100 INJECTION, SOLUTION INTRAVENOUS; SUBCUTANEOUS at 12:47

## 2018-03-21 RX ADMIN — WATER SCH MLS/HR: 1 INJECTION INTRAMUSCULAR; INTRAVENOUS; SUBCUTANEOUS at 14:19

## 2018-03-21 RX ADMIN — WATER SCH: 1 INJECTION INTRAMUSCULAR; INTRAVENOUS; SUBCUTANEOUS at 18:02

## 2018-03-21 RX ADMIN — ENOXAPARIN SODIUM SCH MG: 30 INJECTION SUBCUTANEOUS at 09:42

## 2018-03-21 RX ADMIN — WATER SCH MLS/HR: 1 INJECTION INTRAMUSCULAR; INTRAVENOUS; SUBCUTANEOUS at 23:59

## 2018-03-21 NOTE — CP.PCM.PN
Objective





- Vital Signs/Intake and Output


Vital Signs (last 24 hours): 


 











Temp Pulse Resp BP Pulse Ox


 


 98 F   86   20   123/61   96 


 


 03/21/18 08:16  03/21/18 08:16  03/21/18 08:16  03/21/18 08:16  03/21/18 08:16











- Medications


Medications: 


 Current Medications





Dextrose (Dextrose 50% Inj)  0 ml IV STAT PRN; Protocol


   PRN Reason: Hypoglycemia Protocol


Dextrose (Glutose 15)  0 gm PO ONCE PRN; Protocol


   PRN Reason: Hypoglycemia Protocol


Enoxaparin Sodium (Lovenox)  30 mg SC DAILY [facility]


   PRN Reason: Protocol


   Last Admin: 03/20/18 09:15 Dose:  30 mg


Gabapentin (Neurontin)  600 mg PO BID [facility]placed [facility]


   Last Admin: 03/20/18 21:14 Dose:  600 mg


Glucagon (Glucagen Diagnostic Kit)  0 mg IM STAT PRN; Protocol


   PRN Reason: Hypoglycemia Protocol


Piperacillin Sod/Tazobactam (Sod 3.375 gm/ Sodium Chloride)  100 mls @ 100 mls/

hr IVPB 0200,0800,1400,2000 [facility]placed [facility]


   PRN Reason: Protocol


   Last Admin: 03/21/18 02:01 Dose:  100 mls/hr


Insulin Detemir (Levemir)  40 units SC HS [facility]placed [facility]


   Last Admin: 03/20/18 21:50 Dose:  40 units


Insulin Human Lispro (Humalog)  0 units SC ACHS Replaced by [facility]


   Last Admin: 03/20/18 21:47 Dose:  3 units


Insulin Human Lispro (Humalog)  14 units SC AC Replaced by [facility]


   Last Admin: 03/20/18 11:30 Dose:  Not Given


Pantoprazole Sodium (Protonix Inj)  40 mg IVP DAILY [facility]


   Last Admin: 03/20/18 09:17 Dose:  40 mg


Pioglitazone HCl (Actos)  30 mg PO DAILY [facility]


   Last Admin: 03/20/18 09:14 Dose:  30 mg


Tramadol HCl (Ultram)  50 mg PO Q6 PRN


   PRN Reason: Pain, moderate (4-7)


   Last Admin: 03/20/18 12:16 Dose:  50 mg











- Labs


Labs: 


 





 03/21/18 06:10 





 03/21/18 06:10 





 











PT  13.6 Seconds (9.8-13.1)  H  03/16/18  10:26    


 


INR  1.2  (0.9-1.2)   03/16/18  10:26    


 


APTT  32.5 Seconds (25.6-37.1)   03/16/18  10:26

## 2018-03-21 NOTE — PN
DATE:



ENDO FOLLOWUP NOTE



LOCATION:  Room 667.



SUBJECTIVE:  This is a 67-year-old male with recent uncontrolled type 2

insulin-requiring diabetes presenting here with diabetic ketoacidosis and

dehydration and has since then improved clinically and metabolically as

noted thereof.  His glucose levels overnight ranged from 134 to 226 and 336

mg/dL.  His latest chemistry showed BUN of 14, sodium of 144, potassium of

3.3, chloride of 109, CO2 of 26, glucose of _____, and creatinine of 0.7. 

So at this time, we will modify once again his basal and bolus insulin

regimen and increase the Levemir to 40 units subcu at bedtime daily as

ordered.  We will continue the Humalog given as _____ units subcu t.i.d.

before meals as ordered.  He has also been placed on oral hypoglycemic drug

therapy by his primary physician as noted.  We will obtain serial

chemistries and supplement accordingly as needed.  We will follow.





__________________________________________

Bernie Collins MD





DD:  03/21/2018 16:00:39

DT:  03/21/2018 16:02:31

Job # [id number]

## 2018-03-21 NOTE — CP.PCM.DIS
Provider





- Provider


Date of Admission: 


03/16/18 11:20





Attending physician: 


Gunner Valdez MD





Consults: 





ID


Podiatry


Endocrinology


Time Spent in preparation of Discharge (in minutes): 35





Diagnosis





- Discharge Diagnosis


(1) DKA (diabetic ketoacidoses)


Status: Resolved   


Comment: resolved after medical treatment   





(2) Sepsis


Status: Acute   Priority: High   


Comment: Stable. C/W IV abx for 7 days as per ID   





(3) Chronic ulcer of leg


Status: Chronic   


Comment[facility]   





Hospital Course





- Lab Results


Lab Results: 


 Micro Results





03/16/18 11:54   Blood   Blood Culture - Final


                            NO GROWTH AFTER 5 DAYS


03/16/18 11:54   Blood   Gram Stain - Final


                            TEST NOT PERFORMED


03/16/18 10:26   Blood   Blood Culture - Final


                            NO GROWTH AFTER 5 DAYS


03/16/18 10:26   Blood   Gram Stain - Final


                            TEST NOT PERFORMED


03/16/18 08:00   Naris   MRSA Culture (Admit) - Final


                            MRSA NOT DETECTED


03/16/18 12:11   Urine   Urine Culture - Final


                            No Growth (<1,000 CFU/ML)





 Most Recent Lab Values











WBC  14.3 K/uL (4.8-10.8)  H  03/21/18  06:10    


 


RBC  3.66 Mil/uL (4.40-5.90)  L  03/21/18  06:10    


 


Hgb  9.7 g/dL (12.0-18.0)  L  03/21/18  06:10    


 


Hct  29.7 % (35.0-51.0)  L  03/21/18  06:10    


 


MCV  81.2 fl (80.0-94.0)   03/21/18  06:10    


 


MCH  26.6 pg (27.0-31.0)  L  03/21/18  06:10    


 


MCHC  32.7 g/dL (33.0-37.0)  L  03/21/18  06:10    


 


RDW  15.8 % (11.5-14.5)  H  03/21/18  06:10    


 


Plt Count  309 K/uL (130-400)   03/21/18  06:10    


 


MPV  10.9 fl (7.2-11.7)   03/19/18  04:35    


 


Neut % (Auto)  74.8 % (50.0-75.0)   03/19/18  04:35    


 


Lymph % (Auto)  14.4 % (20.0-40.0)  L  03/19/18  04:35    


 


Mono % (Auto)  8.3 % (0.0-10.0)   03/19/18  04:35    


 


Eos % (Auto)  2.0 % (0.0-4.0)   03/19/18  04:35    


 


Baso % (Auto)  0.5 % (0.0-2.0)   03/19/18  04:35    


 


Neut # (Auto)  13.8 K/uL (1.8-7.0)  H  03/19/18  04:35    


 


Lymph # (Auto)  2.6 K/uL (1.0-4.3)   03/19/18  04:35    


 


Mono # (Auto)  1.5 K/uL (0.0-0.8)  H  03/19/18  04:35    


 


Eos # (Auto)  0.4 K/uL (0.0-0.7)   03/19/18  04:35    


 


Baso # (Auto)  0.1 K/uL (0.0-0.2)   03/19/18  04:35    


 


Neutrophils % (Manual)  80 % (42-75)  H  03/16/18  10:26    


 


Band Neutrophils %  5 % (0-2)  H  03/16/18  10:26    


 


Lymphocytes % (Manual)  7 % (20-50)  L  03/16/18  10:26    


 


Monocytes % (Manual)  6 % (0-10)   03/16/18  10:26    


 


Myelocytes %  2 % (0-0)  H  03/16/18  10:26    


 


Platelet Estimate  Normal  (NORMAL)   03/16/18  10:26    


 


Large Platelets  Present   03/16/18  10:26    


 


Giant Platelets  Present   03/16/18  10:26    


 


Poikilocytosis (manual  Slight   03/16/18  10:26    


 


Anisocytosis (manual)  Slight   03/16/18  10:26    


 


Claude Cells  Slight   03/16/18  10:26    


 


ESR  86 mm/hr (0-20)  H  03/16/18  17:18    


 


PT  13.6 Seconds (9.8-13.1)  H  03/16/18  10:26    


 


INR  1.2  (0.9-1.2)   03/16/18  10:26    


 


APTT  32.5 Seconds (25.6-37.1)   03/16/18  10:26    


 


pO2  44 mm/Hg (30-55)   03/16/18  17:18    


 


VBG pH  7.14  (7.32-7.43)  L*  03/16/18  17:18    


 


VBG pCO2  20 mmHg (40-60)  L  03/16/18  17:18    


 


VBG HCO3  8.1 mmol/L  03/16/18  17:18    


 


VBG Total CO2  7.4 mmol/L (22-28)  L  03/16/18  17:18    


 


VBG O2 Sat (Calc)  87.8 % (40-65)  H  03/16/18  17:18    


 


VBG Base Excess  -20.3 mmol/L (0.0-2.0)  L  03/16/18  17:18    


 


VBG Potassium  3.7 mmol/L (3.6-5.2)   03/16/18  17:18    


 


Sodium  148.0 mmol/L (132-148)   03/16/18  17:18    


 


Chloride  121.0 mmol/L ()  H  03/16/18  17:18    


 


Glucose  575 mg/dL ()  H* D 03/16/18  17:18    


 


Lactate  1.8 mmol/L (0.7-2.1)   03/16/18  17:18    


 


FiO2  21.0 %  03/16/18  17:18    


 


Crit Value Called To  Lorie wang   03/16/18  17:18    


 


Crit Value Called By  Gigi   03/16/18  17:18    


 


Crit Value Read Back  Y   03/16/18  17:18    


 


Blood Gas Notified Time  1726   03/16/18  17:18    


 


Sodium  140 mmol/l (132-148)   03/21/18  06:10    


 


Potassium  3.4 MMOL/L (3.6-5.0)  L  03/21/18  06:10    


 


Chloride  103 mmol/L ()   03/21/18  06:10    


 


Carbon Dioxide  30 mmol/L (22-30)   03/21/18  06:10    


 


Anion Gap  10  (10-20)   03/21/18  06:10    


 


BUN  12 mg/dl (9-20)   03/21/18  06:10    


 


Creatinine  0.8 mg/dl (0.8-1.5)   03/21/18  06:10    


 


Est GFR ( Amer)  > 60   03/21/18  06:10    


 


Est GFR (Non-Af Amer)  > 60   03/21/18  06:10    


 


POC Glucose (mg/dL)  155 mg/dL ()  H  03/21/18  11:09    


 


Random Glucose  82 mg/dL ()   03/21/18  06:10    


 


Serum Osmolality  365 mosm/kg (272-300)  H  03/16/18  17:18    


 


Uric Acid  1.3 mg/Dl (3.5-8.5)  L  03/20/18  05:55    


 


Calcium  8.2 mg/dL (8.4-10.2)  L  03/21/18  06:10    


 


Phosphorus  1.4 mg/dl (2.5-4.5)  L  03/17/18  04:42    


 


Magnesium  2.2 MG/DL (1.6-2.3)   03/17/18  04:42    


 


Total Bilirubin  0.3 mg/dl (0.2-1.3)   03/20/18  05:55    


 


AST  15 U/L (17-59)  L D 03/20/18  05:55    


 


ALT  25 U/L (21-72)   03/20/18  05:55    


 


Alkaline Phosphatase  56 U/L ()   03/20/18  05:55    


 


Total Creatine Kinase  143 U/L ()   03/16/18  17:18    


 


Troponin I  0.4930 ng/mL (0.00-0.120)  H*  03/17/18  15:00    


 


Total Protein  6.1 G/DL (6.3-8.2)  L  03/20/18  05:55    


 


Albumin  2.5 g/dL (3.5-5.0)  L  03/20/18  05:55    


 


Globulin  3.6 gm/dL (2.2-3.9)   03/20/18  05:55    


 


Albumin/Globulin Ratio  0.7  (1.0-2.1)  L  03/20/18  05:55    


 


Procalcitonin  0.22 NG/ML (0.19-0.49)   03/16/18  17:18    


 


Free T4  1.24 ng/dL (0.78-2.19)   03/16/18  17:18    


 


TSH 3rd Generation  0.60 mIU/ML (0.46-4.68)   03/16/18  17:18    


 


Venous Blood Potassium  3.7 mmol/L (3.6-5.2)   03/16/18  17:18    


 


Urine Color  Yellow  (YELLOW)   03/16/18  12:11    


 


Urine Clarity  Slighty-cloudy  (Clear)   03/16/18  12:11    


 


Urine pH  5.0  (5.0-8.0)   03/16/18  12:11    


 


Ur Specific Gravity  1.016  (1.003-1.030)   03/16/18  12:11    


 


Urine Protein  100 mg/dL (NEGATIVE)   03/16/18  12:11    


 


Urine Glucose (UA)  >=500 mg/dL (Normal)   03/16/18  12:11    


 


Urine Ketones  80 mg/dL (NEGATIVE)   03/16/18  12:11    


 


Urine Blood  Moderate  (NEGATIVE)   03/16/18  12:11    


 


Urine Nitrate  Negative  (NEGATIVE)   03/16/18  12:11    


 


Urine Bilirubin  Negative  (NEGATIVE)   03/16/18  12:11    


 


Urine Urobilinogen  0.2-1.0 mg/dL (0.2-1.0)   03/16/18  12:11    


 


Ur Leukocyte Esterase  Neg Veronica/uL (Negative)   03/16/18  12:11    


 


Urine RBC (Auto)  6 /hpf (0-3)  H  03/16/18  12:11    


 


Urine Microscopic WBC  1 /hpf (0-5)   03/16/18  12:11    


 


Ur Squamous Epith Cells  < 1 /hpf (0-5)   03/16/18  12:11    


 


Urine Bacteria  Rare  (<OCC)   03/16/18  12:11    


 


Hyaline Casts  6-10 /hpf (0-2)  H  03/16/18  12:11    


 


Random Vancomycin  8.1 ug/mL  03/17/18  04:42    


 


Urine Opiates Screen  Negative  (NEGATIVE)   03/16/18  17:50    


 


Urine Methadone Screen  Negative  (NEGATIVE)   03/16/18  17:50    


 


Ur Barbiturates Screen  Negative  (NEGATIVE)   03/16/18  17:50    


 


Ur Phencyclidine Scrn  Negative  (NEGATIVE)   03/16/18  17:50    


 


Ur Amphetamines Screen  Negative  (NEGATIVE)   03/16/18  17:50    


 


U Benzodiazepines Scrn  Negative  (NEGATIVE)   03/16/18  17:50    


 


U Oth Cocaine Metabols  Negative  (NEGATIVE)   03/16/18  17:50    


 


U Cannabinoids Screen  Negative  (NEGATIVE)   03/16/18  17:50    














- Hospital Course


Hospital Course: 





66 y/o M with Hx of DM and chronic leg ulcer was admitted to hosp after being 

found unresponsive at home. At ED patient was noticed to be acidotic with BS 

900s, electrolyte imbalance and hypothermic. Patient was admitted to ICU unit. 

Evaluated by Endocrinology, ID, and podiatry and recovered after agressive 

treatment with fluids, abx and insulin. He has been on regular floor stable, 

mental status is back to baseline and chemistry is back to baseline. Patient 

will need IV abx for 7 more days as per ID recs and is decided to transfer him 

to TCU for further care.





Discharge Exam





- Head Exam


Head Exam: NORMOCEPHALIC





- Eye Exam


Eye Exam: EOMI, PERRL





- ENT Exam


ENT Exam: Mucous Membranes Moist





- Respiratory Exam


Respiratory Exam: NORMAL BREATHING PATTERN, UNREMARKABLE.  absent: Decreased 

Breath Sounds, Rhonchi





- Cardiovascular Exam


Cardiovascular Exam: REGULAR RHYTHM, +S1, +S2.  absent: Gallop





- GI/Abdominal Exam


GI & Abdominal Exam: Normal Bowel Sounds, Soft, Unremarkable.  absent: 

Tenderness


Additional comments: 





Scars





- Extremities Exam


Extremities exam: normal capillary refill, pedal pulses present





- Neurological Exam


Neurological exam: Alert, Oriented x3





- Psychiatric Exam


Psychiatric exam: Normal Affect, Normal Mood





- Skin


Skin Exam: Normal Color, Warm





Discharge Plan





- Discharge Medications


Prescriptions: 


Pantoprazole [Protonix] 40 mg PO DAILY #30 ect


Piperacill/Tazo 3.375gm in Dex [Zosyn 3.375 Gm IV] 3.375 gm IVPB Q8 #15 bag





- Follow Up Plan


Condition: STABLE


Disposition: REHAB FACILITY/REHAB UNIT


Instructions:  Hyperglycemia, Adult (DC), Altered Mental Status (DC)


Referrals: 


Bernie Collins MD [Medical Doctor] - 


Gregor Gutierrez MD [Staff Provider] - 


Levi Ascencio DPM [Staff Provider] -

## 2018-03-21 NOTE — PN
ENDOCRINOLOGY FOLLOWUP NOTE



DATE:



LOCATION:  In room 667



SUBJECTIVE:  This is a 67-year-old male with recent uncontrolled type 2

insulin-requiring diabetes presenting here with diabetic ketoacidosis and

dehydration and is now being followed closely for metabolic management. 

His glycemic levels are fluctuating, but improved and the glucose values

today have ranged from 126 to 134 and 158 mg/dL.  His latest chemistry

showed a BUN of 14, sodium 144, potassium 3.3, chloride 109, CO2 of 26,

glucose 113, and creatinine 0.7.  So, at this time, we will continue the

same basal and bolus insulin regimen to allow for dose equilibration and

keep him on the Humalog given as 14 units subcutaneously t.i.d. before

meals as ordered.  We will also continue the basal insulin given as Levemir

at 40 units subcutaneously at bedtime daily as given.  We will continue

also the low-dose correction scale using Humalog insulin as ordered.  His

primary doctor here in the hospital, Dr. Valdez has placed him on oral

hypoglycemic therapy as noted.  He has been placed also on Actos, which

_____ further weight gain and fluid retention _____ has been watched very

closely as noted.  We will obtain serial chemistries and supplement

accordingly as needed.  We will follow.





__________________________________________

Bernie Collins MD





DD:  03/20/2018 23:19:38

DT:  03/20/2018 23:22:02

Job # [id number]

## 2018-03-21 NOTE — CP.PCM.PCO
Physician Communication Note





- Physician Communication Note


Physician Communication Note: Per Dr. Gutierrez, continue IV abx for 7 more days

## 2018-03-21 NOTE — CP.PCM.PN
Subjective





- Date & Time of Evaluation


Date of Evaluation: 03/21/18


Time of Evaluation: 08:34





- Subjective


Subjective: 





Podiatry Progress Note- Dr. Ascencio





67 year old male admitted for sepsis and AMS seen at bedside this morning. 

Patient is awake and alert. Patient is oriented in person and place. Patient 

reports that he is feeling much better than before. He denies acute overnight 

events. He states that he has less foot pain; rates his pain 8/10. Reports prior

, could not touch the foot due to pain. Patient denies nausea, fever, shortness 

of breath, chest pain, chills or fever. No other pedal complaints





Patient reports that Dr. Stanley is his podiatrist.








Objective





- Vital Signs/Intake and Output


Vital Signs (last 24 hours): 


 











Temp Pulse Resp BP Pulse Ox


 


 99.6 F   93 H  20   136/71   98 


 


 03/20/18 23:34  03/20/18 23:34  03/20/18 23:34  03/20/18 23:34  03/20/18 23:34











- Medications


Medications: 


 Current Medications





Dextrose (Dextrose 50% Inj)  0 ml IV STAT PRN; Protocol


   PRN Reason: Hypoglycemia Protocol


Dextrose (Glutose 15)  0 gm PO ONCE PRN; Protocol


   PRN Reason: Hypoglycemia Protocol


Enoxaparin Sodium (Lovenox)  30 mg SC DAILY [facility]


   PRN Reason: Protocol


   Last Admin: 03/20/18 09:15 Dose:  30 mg


Gabapentin (Neurontin)  600 mg PO BID [facility]


   Last Admin: 03/20/18 21:14 Dose:  600 mg


Glucagon (Glucagen Diagnostic Kit)  0 mg IM STAT PRN; Protocol


   PRN Reason: Hypoglycemia Protocol


Piperacillin Sod/Tazobactam (Sod 3.375 gm/ Sodium Chloride)  100 mls @ 100 mls/

hr IVPB 0200,0800,1400,2000 [facility]


   PRN Reason: Protocol


   Last Admin: 03/21/18 02:01 Dose:  100 mls/hr


Insulin Detemir (Levemir)  40 units SC HS [facility]


   Last Admin: 03/20/18 21:50 Dose:  40 units


Insulin Human Lispro (Humalog)  0 units SC ACHS [facility]


   Last Admin: 03/20/18 21:47 Dose:  3 units


Insulin Human Lispro (Humalog)  14 units SC AC [facility]


   Last Admin: 03/20/18 11:30 Dose:  Not Given


Pantoprazole Sodium (Protonix Inj)  40 mg IVP DAILY [facility]


   Last Admin: 03/20/18 09:17 Dose:  40 mg


Pioglitazone HCl (Actos)  30 mg PO DAILY [facility]


   Last Admin: 03/20/18 09:14 Dose:  30 mg


Tramadol HCl (Ultram)  50 mg PO Q6 PRN


   PRN Reason: Pain, moderate (4-7)


   Last Admin: 03/20/18 12:16 Dose:  50 mg











- Labs


Labs: 


 





 03/21/18 06:10 





 03/21/18 06:10 





 











PT  13.6 Seconds (9.8-13.1)  H  03/16/18  10:26    


 


INR  1.2  (0.9-1.2)   03/16/18  10:26    


 


APTT  32.5 Seconds (25.6-37.1)   03/16/18  10:26    














- Constitutional


Appears: Well, Non-toxic, No Acute Distress





- Extremities Exam


Extremities Exam: absent: Calf Tenderness


Additional comments: 





VASC: LLE DP and PT pulses nonpalpable. RLE DP pulse weakly palpable 1/4, PT 

nonpalpable. CFT <3 seconds to all digits x10. Mild nonpitting edmea noted to 

left foot, most prominent on the plantar aspect of the medial arch





NEURO: Protective and gross sensation diminished





DERM: Necrotic eschar noted to dorsolateral left foot with periwound skin 

erosion - no fluctanance appreciated with palpation of the eschar. Mild 

periwound erythema dorsally, no streaking appreciated, no malodor, no purulent 

drainage expressed. No other clinical signs of infection noted





ORTHO: With palpation, no pain elicited. LLE ankle joint ROM limited





- Neurological Exam


Neurological Exam: Alert, Awake





- Psychiatric Exam


Psychiatric exam: Normal Affect, Normal Mood





Assessment and Plan





- Assessment and Plan (Free Text)


Assessment: 





67M with unstageable necrotic eschar to dorsum of left foot, r/o source sepsis


Plan: 





Patient seen and evaluated at bedside


Patient had episode of fever yesterday, afebrile today, WBC trending down 14.3


Plan discussed with attending Dr. Ascencio


Tib/fib xrays ordered and reviewed; negative for soft tissue emphysema


MRI shows no OM; Dorsal cellulitis with plantar foot myositis in trace fluid 

underlying plantarfascia though chronic fasciitis is not excluded at posterior 

plantar fascia


No surgical intervention planned at this time


Wound cleansed with betadine, dressed with gauze, ABD, kerlix


Multipodus boots placed b/l


Continue IV abx per ID


Podiatry will continue to follow while patient in house

## 2018-03-22 RX ADMIN — INSULIN DETEMIR SCH UNITS: 100 INJECTION, SOLUTION SUBCUTANEOUS at 21:35

## 2018-03-22 RX ADMIN — PANTOPRAZOLE SODIUM SCH MG: 40 TABLET, DELAYED RELEASE ORAL at 09:23

## 2018-03-22 RX ADMIN — INSULIN LISPRO SCH: 100 INJECTION, SOLUTION INTRAVENOUS; SUBCUTANEOUS at 06:35

## 2018-03-22 RX ADMIN — INSULIN LISPRO SCH: 100 INJECTION, SOLUTION INTRAVENOUS; SUBCUTANEOUS at 21:33

## 2018-03-22 RX ADMIN — WATER SCH MLS/HR: 1 INJECTION INTRAMUSCULAR; INTRAVENOUS; SUBCUTANEOUS at 12:13

## 2018-03-22 RX ADMIN — WATER SCH: 1 INJECTION INTRAMUSCULAR; INTRAVENOUS; SUBCUTANEOUS at 10:36

## 2018-03-22 RX ADMIN — INSULIN LISPRO SCH: 100 INJECTION, SOLUTION INTRAVENOUS; SUBCUTANEOUS at 12:13

## 2018-03-22 RX ADMIN — INSULIN LISPRO SCH: 100 INJECTION, SOLUTION INTRAVENOUS; SUBCUTANEOUS at 16:31

## 2018-03-22 RX ADMIN — WATER SCH MLS/HR: 1 INJECTION INTRAMUSCULAR; INTRAVENOUS; SUBCUTANEOUS at 21:37

## 2018-03-22 RX ADMIN — INSULIN LISPRO SCH U: 100 INJECTION, SOLUTION INTRAVENOUS; SUBCUTANEOUS at 07:38

## 2018-03-22 RX ADMIN — INSULIN LISPRO SCH U: 100 INJECTION, SOLUTION INTRAVENOUS; SUBCUTANEOUS at 16:32

## 2018-03-22 RX ADMIN — INSULIN LISPRO SCH: 100 INJECTION, SOLUTION INTRAVENOUS; SUBCUTANEOUS at 12:14

## 2018-03-22 NOTE — CP.PCM.CON
History of Present Illness





- History of Present Illness


History of Present Illness: 








66 y/o M with PMhx of IDDM, s/p pancreatic resection was  brought to ED with 

AMS after being found by brother at home laying in the floor naked. Patient was 

admitted to ICU with severe DKA, AMS, ERIC< Sepsis and infected  L/foot ulcer . 

MRI showed severe cellulitis/ myositis but no definite OM  Is being treatewd 

for severe SSTI with myositis left foot for 3 weeks of IV rx 





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 











Review of Systems





- Constitutional


Constitutional: As Per HPI





- EENT


Eyes: absent: As Per HPI, Blind Spots, Blurred Vision, Change in Vision, 

Decreased Night Vision, Diplopia, Discharge, Dry Eye, Exophthalmos, Floaters, 

Irritation, Itchy Eyes, Loss of Peripheral Vision, Pain, Photophobia, Requires 

Corrective Lenses, Sees Flashes, Spots in Vision, Tunnel Vision, Other Visual 

Disturbances, Loss of Vision, Other


Ears: absent: As Per HPI, Decreased Hearing, Ear Discharge, Ear Pain, Tinnitus, 

Abnormal Hearing, Disequilibrium, Dizziness, Other


Nose/Mouth/Throat: absent: As Per HPI, Epistaxis, Nasal Congestion, Nasal 

Discharge, Nasal Obstruction, Nasal Trauma, Nose Pain, Post Nasal Drip, Sinus 

Pain, Sinus Pressure, Bleeding Gums, Change in Voice, Dental Pain, Dry Mouth, 

Dysphagia, Halitosis, Hoarsness, Lip Swelling, Mouth Lesions, Mouth Pain, 

Odynophagia, Sore Throat, Throat Swelling, Tongue Swelling, Facial Pain, Neck 

Pain, Neck Mass, Other





- Cardiovascular


Cardiovascular: As Per HPI





- Respiratory


Respiratory: absent: As Per HPI, Cough, Dyspnea, Hemoptysis, Dyspnea on Exertion

, Wheezing, Snoring, Stridor, Pain on Inspiration, Chest Congestion, Excessive 

Mucous Production, Change in Mucous Color, Pain with Coughing, Other





- Gastrointestinal


Gastrointestinal: As Per HPI





- Genitourinary


Genitourinary: absent: As Per HPI, Change in Urinary Stream, Difficulty 

Urinating, Dysuria, Flank Pain, Hematuria, Pyuria, Nocturia, Urinary 

Incontinence, Urinary Frequency, Urinary Hesitance, Urinary Urgency, Voiding 

Freq/Small Amts, Freq UTI, Hx Renal/Bladder Calculi, Hx /Renal Surgery, 

Bladder Distension, Other





- Musculoskeletal


Musculoskeletal: As Per HPI





- Integumentary


Integumentary: As Per HPI, Dry Skin, Skin Pain, Wounds





- Neurological


Neurological: As Per HPI





- Psychiatric


Psychiatric: absent: As Per HPI, Abnormal Sleep Pattern, Anhedonia, Anxiety, 

Auditory Hallucinations, Behavioral Changes, Change in Appetite, Change in 

Libido, Confusion, Depression, Difficulty Concentrating, Hallucinations, 

Homicidal Ideation, Hopelessness, Irritability, Memory Loss, Mood Swings, Panic 

Attacks, Paranoia, Suicidal Ideation, Visual Hallucinations, Tactile 

Hallucinations, Other





- Endocrine


Endocrine: absent: As Per HPI, Change in Body Appearance, Change in Libido, 

Cold Intolorance, Deepening of Voice, Excessive Sweating, Fatigue, Flushing, 

Heat Intolorance, Increase in Ring/Shoe/Hat Size, Palpitations, Polydipsia, 

Polyphagia, Polyuria, Other





- Hematologic/Lymphatic


Hematologic: absent: As Per HPI, Easy Bleeding, Easy Bruising, Lymphadenopathy, 

Other





Past Patient History





- Past Social History


Smoking Status: Never Smoked





- CARDIAC


Hx Cardiac Disorders: No





- PULMONARY


Hx Respiratory Disorders: No





- NEUROLOGICAL


Hx Neurological Disorder: No





- RENAL


Hx Chronic Kidney Disease: No





- ENDOCRINE/METABOLIC


Hx Diabetes Mellitus Type 1: No


Hx Diabetes Mellitus Type 2: Yes





- HEMATOLOGICAL/ONCOLOGICAL


Hx Blood Disorders: No





- MUSCULOSKELETAL/RHEUMATOLOGICAL


Hx Musculoskeletal Disorders: No


Hx Falls: Yes





- GASTROINTESTINAL


Hx Pancreatitis: Yes





- PSYCHIATRIC


Hx Substance Use: No





- SURGICAL HISTORY


Other/Comment: removal of pancreas





- ANESTHESIA


Hx Anesthesia: Yes


Hx Anesthesia Reactions: No





Meds


Allergies/Adverse Reactions: 


 Allergies











Allergy/AdvReac Type Severity Reaction Status Date / Time


 


No Known Allergies Allergy   Verified 03/21/18 15:46














- Medications


Medications: 


 Current Medications





Enoxaparin Sodium (Lovenox)  40 mg SC DAILY [facility]


   PRN Reason: Protocol


Gabapentin (Neurontin)  600 mg PO BID [facility]


   Last Admin: 03/22/18 09:22 Dose:  600 mg


Piperacillin Sod/Tazobactam (Sod 3.375 gm/ Sodium Chloride)  100 mls @ 100 mls/

hr IVPB Q8@0500,1300,2100 [facility]


   Last Admin: 03/22/18 12:13 Dose:  100 mls/hr


Insulin Detemir (Levemir)  30 units SC HS [facility]


Insulin Human Lispro (Humalog)  0 units SC ACHS [facility]


   Last Admin: 03/22/18 12:13 Dose:  Not Given


Insulin Human Lispro (Humalog)  14 units SC AC [facility]


   Last Admin: 03/22/18 12:14 Dose:  Not Given


Mupirocin (Bactroban Ointment)  1 applic TOP BID [facility]


   Last Admin: 03/22/18 09:23 Dose:  1 applic


Pantoprazole Sodium (Protonix Ec Tab)  40 mg PO DAILY [facility]


   Last Admin: 03/22/18 09:23 Dose:  40 mg


Pioglitazone HCl (Actos)  30 mg PO DAILY [facility]


   Last Admin: 03/22/18 09:23 Dose:  30 mg


Temazepam (Restoril)  15 mg PO HS [facility]


   PRN Reason: Insomnia


Tramadol HCl (Ultram)  50 mg PO Q6 PRN


   PRN Reason: Pain, moderate (4-7)


   Last Admin: 03/22/18 09:25 Dose:  50 mg











Physical Exam





- Constitutional


Appears: Non-toxic, Chronically Ill





- Head Exam


Head Exam: ATRAUMATIC, NORMAL INSPECTION, NORMOCEPHALIC





- Eye Exam


Eye Exam: EOMI, PERRL.  absent: Scleral icterus





- ENT Exam


ENT Exam: Mucous Membranes Dry, Normal External Ear Exam, Normal Oropharynx





- Neck Exam


Neck exam: Negative for: Lymphadenopathy, Thyromegaly





- Respiratory Exam


Respiratory Exam: Decreased Breath Sounds, Rhonchi





- Cardiovascular Exam


Cardiovascular Exam: REGULAR RHYTHM, +S1, +S2





- GI/Abdominal Exam


GI & Abdominal Exam: Diminished Bowel Sounds, Soft.  absent: Tenderness





- Rectal Exam


Rectal Exam: Deferred





-  Exam


 Exam: NORMAL INSPECTION





- Extremities Exam


Extremities exam: Positive for: pedal edema, tenderness, pedal pulses present.  

Negative for: calf tenderness


Additional comments: 





left foot ulcer with eschar / cellulitis 





- Back Exam


Back exam: absent: CVA tenderness (L), CVA tenderness (R)





- Neurological Exam


Neurological exam: Alert, CN II-XII Intact, Oriented x3, Reflexes Normal





- Psychiatric Exam


Psychiatric exam: Normal Mood





- Skin


Skin Exam: Dry





Results





- Vital Signs


Recent Vital Signs: 


 Last Vital Signs











Temp  97.7 F   03/22/18 07:45


 


Pulse  94 H  03/22/18 10:42


 


Resp  20   03/22/18 07:45


 


BP  123/67   03/22/18 10:42


 


Pulse Ox  97   03/22/18 10:42














- Labs


Labs: 


 Laboratory Results - last 24 hr











  03/21/18 03/22/18 03/22/18





  20:26 05:14 10:56


 


POC Glucose (mg/dL)  248 H  95  58 L














Assessment & Plan





- Assessment and Plan (Free Text)


Assessment: 





left foot diabetic ulcer with cellulitis/ myositis


s/p DKA, Sepsis





Cont iv rx for 3 weeks 


podiatry for follow up

## 2018-03-22 NOTE — CP.PCM.HP
History of Present Illness





- History of Present Illness


History of Present Illness: 





66 y/o M with Hx of DM and chronic leg ulcer was admitted to hosp after being 

found unresponsive at home. Patient was admitted to ICU and later transferred 

to regular floor with DJA, electrolyte imbalance and hypothermia.  Evaluated by 

Endocrinology, ID, and podiatry and recovered after aggressive treatment with 

fluids, abx and insulin. Patient was then admitted Yesterday to TCU unit for 

further abx treatment and PT. Doing better today, stable, no acute events 

overnight. Afebrile. Still c/o L/leg pain intermittent in the wound area.








Present on Admission





- Present on Admission


Any Indicators Present on Admission: Yes


History of Uncontrolled Diabetes: Yes





Review of Systems





- Review of Systems


All systems: reviewed and no additional remarkable complaints except





- Integumentary


Integumentary: Other (L/left ulcer)





Past Patient History





- Past Social History


Smoking Status: Never Smoked





- CARDIAC


Hx Cardiac Disorders: No





- PULMONARY


Hx Respiratory Disorders: No





- NEUROLOGICAL


Hx Neurological Disorder: No





- RENAL


Hx Chronic Kidney Disease: No





- ENDOCRINE/METABOLIC


Hx Diabetes Mellitus Type 2: Yes





- HEMATOLOGICAL/ONCOLOGICAL


Hx Blood Disorders: No





- MUSCULOSKELETAL/RHEUMATOLOGICAL


Hx Musculoskeletal Disorders: No


Hx Falls: Yes





- GASTROINTESTINAL


Hx Pancreatitis: Yes





- PSYCHIATRIC


Hx Substance Use: No





- SURGICAL HISTORY


Other/Comment: removal of pancreas





- ANESTHESIA


Hx Anesthesia: Yes


Hx Anesthesia Reactions: No





Meds


Allergies/Adverse Reactions: 


 Allergies











Allergy/AdvReac Type Severity Reaction Status Date / Time


 


No Known Allergies Allergy   Verified 03/21/18 15:46














Physical Exam





- Constitutional


Appears: Non-toxic, No Acute Distress





- Head Exam


Head Exam: NORMAL INSPECTION





- Eye Exam


Eye Exam: EOMI, PERRL





- ENT Exam


ENT Exam: Mucous Membranes Moist





- Respiratory Exam


Respiratory Exam: Clear to Auscultation Bilateral, NORMAL BREATHING PATTERN.  

absent: Decreased Breath Sounds, Rales





- Cardiovascular Exam


Cardiovascular Exam: REGULAR RHYTHM, +S1, +S2.  absent: Gallop





- GI/Abdominal Exam


GI & Abdominal Exam: Normal Bowel Sounds, Soft.  absent: Distended, Guarding, 

Rebound, Rigid, Tenderness





- Extremities Exam


Extremities exam: Negative for: calf tenderness, normal inspection (L/left 

ulcer covered with dressing, clean and dry.)





Results





- Vital Signs


Recent Vital Signs: 





 Last Vital Signs











Temp  97.7 F   03/22/18 07:45


 


Pulse  94 H  03/22/18 07:45


 


Resp  20   03/22/18 07:45


 


BP  123/67   03/22/18 07:45


 


Pulse Ox  97   03/22/18 07:45














- Labs


Labs: 





 Laboratory Results - last 24 hr











  03/21/18





  20:26


 


POC Glucose (mg/dL)  248 H














Assessment & Plan





- Assessment and Plan (Free Text)


Assessment: 





66 y/o M admitted for infected leg ulcer and deconditioning to TCU





Problems


Uncontrolled DM. Stable


Chronic infected leg ulcer


Deconditioning


S/P DKA/Acidosis/Suspected Sepsis





C/W IV abx as per ID


DM controlled: BS 58 today. Will decrease dose of levemir HS to 30. Monitor


Stable VS.


Markedly improvement in general status


C/W wound care by Podiatry

## 2018-03-22 NOTE — CP.PCM.CON
History of Present Illness





- History of Present Illness


History of Present Illness: 





Podiatry Consult Note- Dr. Ascencio





67 y.o male seen and evaluated for continued treatment of left foot ulceration. 

Known to podiatry service when patient was admitted on the [facility] floors. Patient 

is seen with brother and sister at the time of visitation. Patient is laying 

comfortably in bed, in NAD, and AA0x3. Patient reports that he is continuing to 

feel better each day. He reports working with physical therapy today and 

walking in the surgical shoe without issues. Reports his pain today 4/10 and 

describes the pain being localized to the left foot. Patient denies overnight 

events. Denies nausea, fever, shortness of breath, chest pains, or chills. 

Denies calf pain or tenderness. No new pedal complaints today.





Past Patient History





- Past Social History


Smoking Status: Never Smoked





- CARDIAC


Hx Cardiac Disorders: No





- PULMONARY


Hx Respiratory Disorders: No





- NEUROLOGICAL


Hx Neurological Disorder: No





- RENAL


Hx Chronic Kidney Disease: No





- ENDOCRINE/METABOLIC


Hx Diabetes Mellitus Type 1: No


Hx Diabetes Mellitus Type 2: Yes





- HEMATOLOGICAL/ONCOLOGICAL


Hx Blood Disorders: No





- MUSCULOSKELETAL/RHEUMATOLOGICAL


Hx Musculoskeletal Disorders: No


Hx Falls: Yes





- GASTROINTESTINAL


Hx Pancreatitis: Yes





- PSYCHIATRIC


Hx Substance Use: No





- SURGICAL HISTORY


Other/Comment: removal of pancreas





- ANESTHESIA


Hx Anesthesia: Yes


Hx Anesthesia Reactions: No





Meds


Allergies/Adverse Reactions: 


 Allergies











Allergy/AdvReac Type Severity Reaction Status Date / Time


 


No Known Allergies Allergy   Verified 03/21/18 15:46














- Medications


Medications: 


 Current Medications





Enoxaparin Sodium (Lovenox)  40 mg SC DAILY [facility]


   PRN Reason: Protocol


Gabapentin (Neurontin)  600 mg PO BID [facility]


   Last Admin: 03/22/18 09:22 Dose:  600 mg


Piperacillin Sod/Tazobactam (Sod 3.375 gm/ Sodium Chloride)  100 mls @ 100 mls/

hr IVPB Q8 [facility]


   Last Admin: 03/22/18 10:36 Dose:  Not Given


Insulin Detemir (Levemir)  40 units SC HS [facility]


   Last Admin: 03/21/18 21:07 Dose:  40 units


Insulin Human Lispro (Humalog)  0 units SC ACHS [facility]


   Last Admin: 03/22/18 06:35 Dose:  Not Given


Insulin Human Lispro (Humalog)  14 units SC AC [facility]


   Last Admin: 03/22/18 07:38 Dose:  1 u


Mupirocin (Bactroban Ointment)  1 applic TOP BID [facility]


   Last Admin: 03/22/18 09:23 Dose:  1 applic


Pantoprazole Sodium (Protonix Ec Tab)  40 mg PO DAILY [facility]


   Last Admin: 03/22/18 09:23 Dose:  40 mg


Pioglitazone HCl (Actos)  30 mg PO DAILY [facility]


   Last Admin: 03/22/18 09:23 Dose:  30 mg


Tramadol HCl (Ultram)  50 mg PO Q6 PRN


   PRN Reason: Pain, moderate (4-7)


   Last Admin: 03/22/18 09:25 Dose:  50 mg











Physical Exam





- Constitutional


Appears: Well, Non-toxic, No Acute Distress





- Extremities Exam


Extremities exam: Negative for: calf tenderness


Additional comments: 





VASC: LLE DP and PT pulses nonpalpable. RLE DP pulse weakly palpable 1/4, PT 

nonpalpable. CFT <3 seconds to all digits x10. Mild nonpitting edmea noted to 

left foot, most prominent on the plantar aspect of the medial arch





NEURO: Protective and gross sensation diminished





DERM: Necrotic eschar noted to dorsolateral left foot with periwound skin 

erosion - no fluctanance appreciated with palpation of the eschar. Mild 

periwound erythema dorsally, no streaking appreciated, no malodor, no purulent 

drainage expressed. No other clinical signs of infection noted, mild maceration 

noted to the distal portion of necrotic eschar, no drainage expressed, no 

fluctance noted





ORTHO: With palpation, very mild pain elicited. LLE ankle joint ROM limited





Results





- Vital Signs


Recent Vital Signs: 


 Last Vital Signs











Temp  97.7 F   03/22/18 07:45


 


Pulse  94 H  03/22/18 07:45


 


Resp  20   03/22/18 07:45


 


BP  123/67   03/22/18 07:45


 


Pulse Ox  97   03/22/18 07:45














- Labs


Labs: 


 Laboratory Results - last 24 hr











  03/21/18 03/22/18





  20:26 05:14


 


POC Glucose (mg/dL)  248 H  95














Assessment & Plan





- Assessment and Plan (Free Text)


Assessment: 





67M with unstageable necrotic eschar to dorsum of left foot, r/o source sepsis


Plan: 





Patient seen and evaluated at bedside


Patient had episode of fever yesterday, afebrile today, WBC trended down 14.3 (3

/21/18) since admission (25.6 on 3/16/18)


Plan discussed with attending Dr. Ascencio


Tib/fib xrays ordered and reviewed; negative for soft tissue emphysema


MRI shows no OM; Dorsal cellulitis with plantar foot myositis in trace fluid 

underlying plantarfascia though chronic fasciitis is not excluded at posterior 

plantar fascia


No surgical intervention planned at this time


Wound cleansed with betadine, dressed with gauze, ABD, kerlix


Multipodus boots placed b/l


Patient may WBAT to the heels to the left foot in surgical shoe


Continue IV abx per ID


Podiatry will continue to follow while patient in house

## 2018-03-23 RX ADMIN — ENOXAPARIN SODIUM SCH MG: 40 INJECTION SUBCUTANEOUS at 09:03

## 2018-03-23 RX ADMIN — PANTOPRAZOLE SODIUM SCH MG: 40 TABLET, DELAYED RELEASE ORAL at 09:02

## 2018-03-23 RX ADMIN — INSULIN LISPRO SCH: 100 INJECTION, SOLUTION INTRAVENOUS; SUBCUTANEOUS at 17:27

## 2018-03-23 RX ADMIN — INSULIN LISPRO SCH U: 100 INJECTION, SOLUTION INTRAVENOUS; SUBCUTANEOUS at 07:10

## 2018-03-23 RX ADMIN — INSULIN LISPRO SCH: 100 INJECTION, SOLUTION INTRAVENOUS; SUBCUTANEOUS at 07:09

## 2018-03-23 RX ADMIN — INSULIN LISPRO SCH UNITS: 100 INJECTION, SOLUTION INTRAVENOUS; SUBCUTANEOUS at 17:29

## 2018-03-23 RX ADMIN — WATER SCH MLS/HR: 1 INJECTION INTRAMUSCULAR; INTRAVENOUS; SUBCUTANEOUS at 12:10

## 2018-03-23 RX ADMIN — INSULIN LISPRO SCH: 100 INJECTION, SOLUTION INTRAVENOUS; SUBCUTANEOUS at 11:18

## 2018-03-23 RX ADMIN — WATER SCH MLS/HR: 1 INJECTION INTRAMUSCULAR; INTRAVENOUS; SUBCUTANEOUS at 04:48

## 2018-03-23 RX ADMIN — WATER SCH MLS/HR: 1 INJECTION INTRAMUSCULAR; INTRAVENOUS; SUBCUTANEOUS at 20:44

## 2018-03-23 RX ADMIN — INSULIN LISPRO SCH: 100 INJECTION, SOLUTION INTRAVENOUS; SUBCUTANEOUS at 21:10

## 2018-03-23 RX ADMIN — INSULIN DETEMIR SCH UNITS: 100 INJECTION, SOLUTION SUBCUTANEOUS at 21:07

## 2018-03-23 NOTE — PN
ENDOCRINOLOGY FOLLOWUP NOTE



DATE:



LOCATION:  In room 708



SUBJECTIVE:  This is a 67-year-old male with recent uncontrolled type 2

insulin-requiring diabetes presenting here with diabetic ketoacidosis and

dehydration and is now being transferred to TCU for deconditioning and

ongoing physical therapy as noted and ordered.  He is being referred now

for diabetic evaluation because of extremes of glycemic fluctuations as

noted thereof.  Earlier today at about 10:45, he had the sudden onset of

symptomatic hypoglycemia with the glucose level of 47 related to a very nil

and suboptimal breakfast portions as noted today.  His glucose levels

otherwise have been near optimal and _____ fluctuating and have ranged from

151 to 181 and 252 mg/dL.  His latest chemistries showed a BUN of 12,

sodium 140, potassium 3.4, chloride 103, CO2 of 30, glucose 269, and

creatinine 0.8.  His hemoglobin A1c is 9.6%, which is quite elevated and

indicative of suboptimal metabolic control of his diabetic condition.  So,

at this time, we will modify his basal and bolus insulin regimen and lower

the Humalog to 8 units subcutaneously t.i.d. before meals to start at

dinnertime today as ordered.  We will also modify the coverage scale to a

very low dose algorithm using Humalog insulin as given.  Moreover, we will

lower the basal insulin to Levemir given as 30 units subcutaneously at

bedtime daily to start tonight.  We will titrate incremental as indicated

to optimize metabolic control.  We will follow and advise accordingly.





__________________________________________

Bernie Collins MD





DD:  03/23/2018 17:33:17

DT:  03/23/2018 17:36:13

Job # [id number]

## 2018-03-23 NOTE — CP.PCM.PN
Subjective





- Date & Time of Evaluation


Date of Evaluation: 03/23/18


Time of Evaluation: 09:06





- Subjective


Subjective: 





Podiatry Consult Note- Dr. Ascencio





67 y.o male seen and evaluated for continued treatment of left foot ulceration. 

Patient is laying comfortably in bed, in NAD, and AA0x3. Patient reports that 

he had pain to the left foot yesterday night in which he rates 10/10. Pain was 

managed by pain medication. Denies nausea, fever, vomiting, shortness of breath

, chest pains, or chills. Denies calf pain or tenderness. No new pedal 

complaints today.





Objective





- Vital Signs/Intake and Output


Vital Signs (last 24 hours): 


 











Temp Pulse Resp BP Pulse Ox


 


 97.8 F   85   20   119/67   99 


 


 03/23/18 07:57  03/23/18 07:57  03/23/18 07:57  03/23/18 07:57  03/23/18 07:57











- Medications


Medications: 


 Current Medications





Enoxaparin Sodium (Lovenox)  40 mg SC DAILY [facility]


   PRN Reason: Protocol


   Last Admin: 03/23/18 09:03 Dose:  40 mg


Gabapentin (Neurontin)  600 mg PO BID [facility]


   Last Admin: 03/23/18 09:02 Dose:  600 mg


Piperacillin Sod/Tazobactam (Sod 3.375 gm/ Sodium Chloride)  100 mls @ 100 mls/

hr IVPB Q8@0500,1300,2100 [facility]


   Last Admin: 03/23/18 04:48 Dose:  100 mls/hr


Insulin Detemir (Levemir)  30 units SC HS [facility]


   Last Admin: 03/22/18 21:35 Dose:  30 units


Insulin Human Lispro (Humalog)  0 units SC ACHS [facility]


   Last Admin: 03/23/18 07:09 Dose:  Not Given


Insulin Human Lispro (Humalog)  14 units SC AC [facility]


   Last Admin: 03/23/18 07:10 Dose:  14 u


Mupirocin (Bactroban Ointment)  1 applic TOP BID [facility]


   Last Admin: 03/23/18 09:02 Dose:  1 applic


Pantoprazole Sodium (Protonix Ec Tab)  40 mg PO DAILY [facility]


   Last Admin: 03/23/18 09:02 Dose:  40 mg


Pioglitazone HCl (Actos)  30 mg PO DAILY [facility]


   Last Admin: 03/23/18 09:02 Dose:  30 mg


Temazepam (Restoril)  15 mg PO HS PRN


   PRN Reason: Insomnia


Tramadol HCl (Ultram)  50 mg PO Q6 PRN


   PRN Reason: Pain, moderate (4-7)


   Last Admin: 03/22/18 16:29 Dose:  50 mg











- Constitutional


Appears: Well, Non-toxic, No Acute Distress





- Extremities Exam


Extremities Exam: absent: Calf Tenderness


Additional comments: 





VASC: LLE DP and PT pulses nonpalpable. RLE DP pulse weakly palpable 1/4, PT 

nonpalpable. CFT <3 seconds to all digits x10. Mild nonpitting edmea noted to 

left foot, most prominent on the plantar aspect of the medial arch





NEURO: Protective and gross sensation diminished





DERM: Necrotic eschar noted to dorsolateral left foot with periwound skin 

erosion - no fluctanance appreciated with palpation of the eschar. Mild 

periwound erythema dorsally, no streaking appreciated, no malodor, no purulent 

drainage expressed. No other clinical signs of infection noted, mild maceration 

noted to the distal portion of necrotic eschar, no drainage expressed, no 

fluctance noted





ORTHO: With palpation, very mild pain elicited. LLE ankle joint ROM limited








- Neurological Exam


Neurological Exam: Alert, Awake, Oriented x3





- Psychiatric Exam


Psychiatric exam: Normal Affect, Normal Mood





Assessment and Plan





- Assessment and Plan (Free Text)


Assessment: 








67M with unstageable necrotic eschar to dorsum of left foot


Plan: 





Patient seen and evaluated at bedside


Afebrile, WBC trended down 14.3 on 3/21/18  since admission WBC=25.6 on 3/16/18


Discussed plan in detail with attending Dr. Ascencio


Tib/fib xrays ordered and reviewed; negative for soft tissue emphysema


MRI shows no OM; Dorsal cellulitis with plantar foot myositis in trace fluid 

underlying plantar fascia though chronic fasciitis is not excluded at posterior 

plantar fascia


No surgical intervention planned at this time


Wound cleansed with betadine, dressed with gauze, ABD, kerlix


Multipodus boots placed b/l


Patient may WBAT to the heels to the left foot in surgical shoe


Continue IV abx per ID


Podiatry will continue to follow while patient in house

## 2018-03-23 NOTE — CP.PCM.PN
Subjective





- Date & Time of Evaluation


Date of Evaluation: 03/23/18


Time of Evaluation: 12:20





- Subjective


Subjective: 





Doing better, stable. no acute distress, no acute events overnight. Denies CP , 

palpitations or SOB. Still c/o Left leg pain at times controlled with meds. 





Objective





- Vital Signs/Intake and Output


Vital Signs (last 24 hours): 


 











Temp Pulse Resp BP Pulse Ox


 


 97.8 F   85   20   119/67   99 


 


 03/23/18 07:57  03/23/18 07:57  03/23/18 07:57  03/23/18 07:57  03/23/18 07:57











- Medications


Medications: 


 Current Medications





Enoxaparin Sodium (Lovenox)  40 mg SC DAILY [facility]


   PRN Reason: Protocol


   Last Admin: 03/23/18 09:03 Dose:  40 mg


Gabapentin (Neurontin)  600 mg PO BID [facility]


   Last Admin: 03/23/18 09:02 Dose:  600 mg


Piperacillin Sod/Tazobactam (Sod 3.375 gm/ Sodium Chloride)  100 mls @ 100 mls/

hr IVPB Q8@0500,1300,2100 [facility]


   Last Admin: 03/23/18 12:10 Dose:  100 mls/hr


Insulin Detemir (Levemir)  30 units SC HS [facility]


   Last Admin: 03/22/18 21:35 Dose:  30 units


Insulin Human Lispro (Humalog)  0 units SC ACHS [facility]


   Last Admin: 03/23/18 11:18 Dose:  Not Given


Insulin Human Lispro (Humalog)  14 units SC AC [facility]


   Last Admin: 03/23/18 11:18 Dose:  Not Given


Mupirocin (Bactroban Ointment)  1 applic TOP BID [facility]


   Last Admin: 03/23/18 09:02 Dose:  1 applic


Pantoprazole Sodium (Protonix Ec Tab)  40 mg PO DAILY [facility]


   Last Admin: 03/23/18 09:02 Dose:  40 mg


Pioglitazone HCl (Actos)  30 mg PO DAILY [facility]


   Last Admin: 03/23/18 09:02 Dose:  30 mg


Temazepam (Restoril)  15 mg PO HS PRN


   PRN Reason: Insomnia


Tramadol HCl (Ultram)  50 mg PO Q6 PRN


   PRN Reason: Pain, moderate (4-7)


   Last Admin: 03/23/18 09:10 Dose:  50 mg











- Constitutional


Appears: Non-toxic, No Acute Distress





- Eye Exam


Eye Exam: EOMI, PERRL





- ENT Exam


ENT Exam: Mucous Membranes Moist





- Neck Exam


Neck Exam: Full ROM





- Respiratory Exam


Respiratory Exam: Clear to Ausculation Bilateral, NORMAL BREATHING PATTERN





- Cardiovascular Exam


Cardiovascular Exam: REGULAR RHYTHM, +S1, +S2.  absent: Gallop





- GI/Abdominal Exam


GI & Abdominal Exam: Soft, Normal Bowel Sounds.  absent: Firm, Tenderness, 

Rebound





- Extremities Exam


Extremities Exam: absent: Calf Tenderness, Normal Inspection (left foot covered 

with dressing clean and dry. No signs of acute neurovascular compromise)





- Neurological Exam


Neurological Exam: Alert, Awake, Oriented x3





- Psychiatric Exam


Psychiatric exam: Normal Affect, Normal Mood





- Skin


Skin Exam: Warm





Assessment and Plan





- Assessment and Plan (Free Text)


Assessment: 





left foot infected wound with miositis


C/W IV as per ID


Will need PICC line before Dc home to c/w IV abx


podiatry consult appreciated


ID consult appreciated


C/W Current treatment

## 2018-03-24 RX ADMIN — INSULIN LISPRO SCH: 100 INJECTION, SOLUTION INTRAVENOUS; SUBCUTANEOUS at 21:26

## 2018-03-24 RX ADMIN — INSULIN LISPRO SCH: 100 INJECTION, SOLUTION INTRAVENOUS; SUBCUTANEOUS at 11:50

## 2018-03-24 RX ADMIN — ENOXAPARIN SODIUM SCH MG: 40 INJECTION SUBCUTANEOUS at 08:08

## 2018-03-24 RX ADMIN — PANTOPRAZOLE SODIUM SCH MG: 40 TABLET, DELAYED RELEASE ORAL at 08:09

## 2018-03-24 RX ADMIN — WATER SCH MLS/HR: 1 INJECTION INTRAMUSCULAR; INTRAVENOUS; SUBCUTANEOUS at 21:23

## 2018-03-24 RX ADMIN — WATER SCH MLS/HR: 1 INJECTION INTRAMUSCULAR; INTRAVENOUS; SUBCUTANEOUS at 04:32

## 2018-03-24 RX ADMIN — WATER SCH MLS/HR: 1 INJECTION INTRAMUSCULAR; INTRAVENOUS; SUBCUTANEOUS at 12:03

## 2018-03-24 RX ADMIN — INSULIN LISPRO SCH: 100 INJECTION, SOLUTION INTRAVENOUS; SUBCUTANEOUS at 11:53

## 2018-03-24 RX ADMIN — INSULIN LISPRO SCH UNITS: 100 INJECTION, SOLUTION INTRAVENOUS; SUBCUTANEOUS at 07:45

## 2018-03-24 RX ADMIN — INSULIN LISPRO SCH: 100 INJECTION, SOLUTION INTRAVENOUS; SUBCUTANEOUS at 06:34

## 2018-03-24 RX ADMIN — INSULIN LISPRO SCH: 100 INJECTION, SOLUTION INTRAVENOUS; SUBCUTANEOUS at 16:43

## 2018-03-24 NOTE — PN
DATE:



ENDO FOLLOWUP NOTE



LOCATION:  Room 708.



SUBJECTIVE:  This is a 67-year-old male with recent uncontrolled type 2

insulin-requiring diabetes presenting here with diabetic ketoacidosis and

dehydration and was seen initially at the Intensive Care Unit and has since

then improved clinically and metabolically as noted thereof.  He has since

then been transferred to TCU with ongoing physical and occupational therapy

as noted and given.  His glucose values, however, are quite variable with

the variability of his oral intake and today had an asymptomatic

hypoglycemic episode and the glucose level of 38 mg/dL with the repeat

level of 55 after orange juice intake.  The fasting glucose, however, was

176 this morning and it was 266 at bedtime last night.  So at this time, we

will modify once again his basal and bolus insulin regimen to _____ the

variability of his oral intake and lower the Humalog to 4 units subcu

t.i.d. before meals to start today as ordered.  We will also lower the

basal insulin with Levemir given as 20 units subcu at bedtime daily as

given.  We will continue the low-dose correction scale using Humalog

insulin as ordered.  We will follow.





__________________________________________

Bernie Collins MD





DD:  03/24/2018 13:49:39

DT:  03/24/2018 13:52:49

Job # [id number]

## 2018-03-25 LAB
ALBUMIN SERPL-MCNC: 2.9 G/DL (ref 3.5–5)
ALBUMIN/GLOB SERPL: 0.7 {RATIO} (ref 1–2.1)
ALT SERPL-CCNC: 59 U/L (ref 21–72)
AST SERPL-CCNC: 91 U/L (ref 17–59)
BASOPHILS # BLD AUTO: 0.1 K/UL (ref 0–0.2)
BASOPHILS NFR BLD: 0.8 % (ref 0–2)
BUN SERPL-MCNC: 12 MG/DL (ref 9–20)
CALCIUM SERPL-MCNC: 8.3 MG/DL (ref 8.4–10.2)
EOSINOPHIL # BLD AUTO: 0.4 K/UL (ref 0–0.7)
EOSINOPHIL NFR BLD: 2.9 % (ref 0–4)
ERYTHROCYTE [DISTWIDTH] IN BLOOD BY AUTOMATED COUNT: 16.2 % (ref 11.5–14.5)
GFR NON-AFRICAN AMERICAN: > 60
HGB BLD-MCNC: 8.3 G/DL (ref 12–18)
LYMPHOCYTES # BLD AUTO: 2 K/UL (ref 1–4.3)
LYMPHOCYTES NFR BLD AUTO: 13.8 % (ref 20–40)
MCH RBC QN AUTO: 26.2 PG (ref 27–31)
MCHC RBC AUTO-ENTMCNC: 32.3 G/DL (ref 33–37)
MCV RBC AUTO: 81.2 FL (ref 80–94)
MONOCYTES # BLD: 2.6 K/UL (ref 0–0.8)
MONOCYTES NFR BLD: 18.2 % (ref 0–10)
NEUTROPHILS # BLD: 9.1 K/UL (ref 1.8–7)
NEUTROPHILS NFR BLD AUTO: 64.3 % (ref 50–75)
NRBC BLD AUTO-RTO: 0 % (ref 0–0)
PLATELET # BLD: 512 K/UL (ref 130–400)
PMV BLD AUTO: 12.4 FL (ref 7.2–11.7)
RBC # BLD AUTO: 3.16 MIL/UL (ref 4.4–5.9)
WBC # BLD AUTO: 14.2 K/UL (ref 4.8–10.8)

## 2018-03-25 RX ADMIN — WATER SCH MLS/HR: 1 INJECTION INTRAMUSCULAR; INTRAVENOUS; SUBCUTANEOUS at 05:08

## 2018-03-25 RX ADMIN — INSULIN LISPRO SCH: 100 INJECTION, SOLUTION INTRAVENOUS; SUBCUTANEOUS at 16:53

## 2018-03-25 RX ADMIN — INSULIN LISPRO SCH UNITS: 100 INJECTION, SOLUTION INTRAVENOUS; SUBCUTANEOUS at 12:09

## 2018-03-25 RX ADMIN — INSULIN LISPRO SCH UNITS: 100 INJECTION, SOLUTION INTRAVENOUS; SUBCUTANEOUS at 08:38

## 2018-03-25 RX ADMIN — WATER SCH MLS/HR: 1 INJECTION INTRAMUSCULAR; INTRAVENOUS; SUBCUTANEOUS at 21:06

## 2018-03-25 RX ADMIN — PANTOPRAZOLE SODIUM SCH MG: 40 TABLET, DELAYED RELEASE ORAL at 08:40

## 2018-03-25 RX ADMIN — INSULIN LISPRO SCH: 100 INJECTION, SOLUTION INTRAVENOUS; SUBCUTANEOUS at 12:07

## 2018-03-25 RX ADMIN — INSULIN LISPRO SCH UNITS: 100 INJECTION, SOLUTION INTRAVENOUS; SUBCUTANEOUS at 16:54

## 2018-03-25 RX ADMIN — INSULIN LISPRO SCH: 100 INJECTION, SOLUTION INTRAVENOUS; SUBCUTANEOUS at 21:01

## 2018-03-25 RX ADMIN — ENOXAPARIN SODIUM SCH MG: 40 INJECTION SUBCUTANEOUS at 08:39

## 2018-03-25 RX ADMIN — INSULIN LISPRO SCH: 100 INJECTION, SOLUTION INTRAVENOUS; SUBCUTANEOUS at 08:37

## 2018-03-25 RX ADMIN — WATER SCH MLS/HR: 1 INJECTION INTRAMUSCULAR; INTRAVENOUS; SUBCUTANEOUS at 12:10

## 2018-03-25 NOTE — PN
DATE:



ENDO FOLLOWUP NOTE



LOCATION:  [address] [place] [address].



SUBJECTIVE:  This is a 67-year-old male with recent uncontrolled type 2

insulin-requiring diabetes presenting here with diabetic ketoacidosis and

dehydration and is now being followed closely for metabolic management.  He

received intensive insulin therapy with an insulin drip infusion and also

vigorous IV hydration and has since then improved clinically and

metabolically as noted thereof.  His latest glucose levels overnight are

still fluctuating but improved and the glucose values have ranged from 273

mg/dL to 289 mg/dL.  The latest chemistry showed BUN of 12, sodium of 138,

potassium of 4.6, chloride of 99, CO2 of 32, glucose of 264, and creatinine

of 1.0.  So at this time, we will modify once again his basal insulin and

increase the Levemir to 24 units subcu at bedtime daily as ordered.  We

will continue the Humalog given as 6 units t.i.d. before meals as given. 

He is also on Januvia given as 50 mg once daily as ordered.  We will

continue the low-dose correction scale using Humalog insulin as given.  We

will titrate incrementally as indicated to optimize metabolic control.  We

will follow and advice accordingly.





__________________________________________

Bernie Collins MD





DD:  03/25/2018 10:08:20

DT:  03/25/2018 10:10:33

Job # [id number]

## 2018-03-25 NOTE — CP.PCM.PN
Subjective





- Date & Time of Evaluation


Date of Evaluation: 03/25/18


Time of Evaluation: 13:00





- Subjective


Subjective: 





Podiatry Progress Note- Dr. Ascencio





67 y.o male seen and evaluated for continued treatment of left foot ulceration. 

Patient is laying comfortably in bed, in NAD, and AA0x3. Patient denies acute 

overnight events. Reports less pain. Pain well managed by pain medication. 

Denies nausea, fever, vomiting, shortness of breath, chest pains, or chills. 

Denies calf pain or tenderness. No new pedal complaints today.





Objective





- Vital Signs/Intake and Output


Vital Signs (last 24 hours): 


 











Temp Pulse Resp BP Pulse Ox


 


 98.8 F   86   20   109/66   90 L


 


 03/25/18 15:36  03/25/18 15:36  03/25/18 15:36  03/25/18 15:36  03/25/18 15:36











- Medications


Medications: 


 Current Medications





Enoxaparin Sodium (Lovenox)  40 mg SC DAILY [facility]


   PRN Reason: Protocol


   Last Admin: 03/25/18 08:39 Dose:  40 mg


Gabapentin (Neurontin)  600 mg PO BID [facility]


   Last Admin: 03/25/18 08:40 Dose:  600 mg


Piperacillin Sod/Tazobactam (Sod 3.375 gm/ Sodium Chloride)  100 mls @ 100 mls/

hr IVPB Q8@0500,1300,2100 [facility]


   Last Admin: 03/25/18 12:10 Dose:  100 mls/hr


Insulin Detemir (Levemir)  24 units SC HS [facility]


Insulin Human Lispro (Humalog)  0 units SC ACHS [facility]


   Last Admin: 03/25/18 12:07 Dose:  Not Given


Insulin Human Lispro (Humalog)  6 units SC AC [facility]


   Last Admin: 03/25/18 12:09 Dose:  6 units


Mupirocin (Bactroban Ointment)  1 applic TOP BID [facility]


   Last Admin: 03/25/18 08:38 Dose:  1 applic


Pantoprazole Sodium (Protonix Ec Tab)  40 mg PO DAILY [facility]


   Last Admin: 03/25/18 08:40 Dose:  40 mg


Sitagliptin Phosphate (Januvia)  50 mg PO DAILY [facility]


   Last Admin: 03/25/18 08:40 Dose:  50 mg


Temazepam (Restoril)  15 mg PO HS PRN


   PRN Reason: Insomnia


   Last Admin: 03/25/18 02:12 Dose:  15 mg


Tramadol HCl (Ultram)  50 mg PO Q6 PRN


   PRN Reason: Pain, moderate (4-7)


   Last Admin: 03/23/18 09:10 Dose:  50 mg











- Labs


Labs: 


 





 03/25/18 07:39 





 03/25/18 07:39 











- Constitutional


Appears: Well, Non-toxic, No Acute Distress





- Extremities Exam


Extremities Exam: absent: Calf Tenderness


Additional comments: 





VASC: LLE DP and PT pulses nonpalpable. RLE DP pulse weakly palpable 1/4, PT 

nonpalpable. CFT <3 seconds to all digits x10. Mild nonpitting edmea noted to 

left foot, most prominent on the plantar aspect of the medial arch





NEURO: Protective and gross sensation diminished





DERM: Necrotic eschar is becoming more fibrotic noted to dorsolateral left foot 

with periwound skin erosion - no fluctanance appreciated with palpation of the 

eschar. Mild periwound erythema surrounding entire eschar, no streaking 

appreciated, no malodor, no purulent drainage or drainage expressed. Increase 

in maceration noted to entire dorsolateral ulceration, no drainage expressed, 

no fluctance noted





ORTHO: With palpation, very mild pain elicited. LLE ankle joint ROM limited








- Neurological Exam


Neurological Exam: Alert, Awake, Oriented x3





- Psychiatric Exam


Psychiatric exam: Normal Affect, Normal Mood





Assessment and Plan





- Assessment and Plan (Free Text)


Assessment: 





67M with unstageable necrotic eschar to dorsum of left foot, appearing more 

fibrotic


Plan: 





Patient seen and evaluated at bedside


Afebrile, WBC 14.3 on 3/21/18, 14.2 today


Discussed plan in detail with attending Dr. Ascencio


-Changes to wound noted with increase maceration to the periwound


-Will closely monitor


-Wound cleansed with betadine, dressed with gauze, ABD, kerlix


Tib/fib xrays ordered and reviewed; negative for soft tissue emphysema


MRI shows no OM; Dorsal cellulitis with plantar foot myositis in trace fluid 

underlying plantar fascia though chronic fasciitis is not excluded at posterior 

plantar fascia


Multipodus boots placed b/l


Patient may WBAT to the heels to the left foot in surgical shoe


Continue IV abx per ID


Podiatry will continue to follow while patient in house

## 2018-03-26 LAB
% IRON SATURATION: 9 % (ref 20–55)
ANISOCYTOSIS BLD QL SMEAR: SLIGHT
BASOPHILS # BLD AUTO: 0.1 K/UL (ref 0–0.2)
BASOPHILS NFR BLD: 0.3 % (ref 0–2)
BUN SERPL-MCNC: 13 MG/DL (ref 9–20)
CALCIUM SERPL-MCNC: 8.9 MG/DL (ref 8.4–10.2)
EOSINOPHIL # BLD AUTO: 0.7 K/UL (ref 0–0.7)
EOSINOPHIL NFR BLD: 4.1 % (ref 0–4)
EOSINOPHIL NFR BLD: 8 % (ref 0–7)
ERYTHROCYTE [DISTWIDTH] IN BLOOD BY AUTOMATED COUNT: 15.7 % (ref 11.5–14.5)
FERRITIN SERPL-MCNC: 295 NG/ML (ref 17.9–464)
GFR NON-AFRICAN AMERICAN: > 60
HGB BLD-MCNC: 9 G/DL (ref 12–18)
HYPOCHROMIC: SLIGHT
IRON SERPL-MCNC: 24 UG/DL (ref 49–181)
LYMPHOCYTE: 12 % (ref 20–50)
LYMPHOCYTES # BLD AUTO: 1.6 K/UL (ref 1–4.3)
LYMPHOCYTES NFR BLD AUTO: 9.2 % (ref 20–40)
MCH RBC QN AUTO: 26.3 PG (ref 27–31)
MCHC RBC AUTO-ENTMCNC: 32 G/DL (ref 33–37)
MCV RBC AUTO: 82.1 FL (ref 80–94)
MONOCYTE: 10 % (ref 0–10)
MONOCYTES # BLD: 2.3 K/UL (ref 0–0.8)
MONOCYTES NFR BLD: 12.6 % (ref 0–10)
NEUTROPHILS # BLD: 13.2 K/UL (ref 1.8–7)
NEUTROPHILS NFR BLD AUTO: 70 % (ref 42–75)
NEUTROPHILS NFR BLD AUTO: 73.8 % (ref 50–75)
NRBC BLD AUTO-RTO: 0.1 % (ref 0–0)
PLATELET # BLD EST: (no result) 10*3/UL
PLATELET # BLD: 613 K/UL (ref 130–400)
PMV BLD AUTO: 11.8 FL (ref 7.2–11.7)
RBC # BLD AUTO: 3.41 MIL/UL (ref 4.4–5.9)
SCHISTOCYTES BLD QL SMEAR: SLIGHT
TARGETS BLD QL SMEAR: SLIGHT
TIBC SERPL-MCNC: 248 UG/DL (ref 250–450)
TOTAL CELLS COUNTED BLD: 100
TOXIC GRANULES BLD QL SMEAR: PRESENT
VIT B12 SERPL-MCNC: 581 PG/ML (ref 239–931)
WBC # BLD AUTO: 17.9 K/UL (ref 4.8–10.8)

## 2018-03-26 RX ADMIN — INSULIN LISPRO SCH: 100 INJECTION, SOLUTION INTRAVENOUS; SUBCUTANEOUS at 16:19

## 2018-03-26 RX ADMIN — WATER SCH MLS/HR: 1 INJECTION INTRAMUSCULAR; INTRAVENOUS; SUBCUTANEOUS at 21:16

## 2018-03-26 RX ADMIN — WATER SCH MLS/HR: 1 INJECTION INTRAMUSCULAR; INTRAVENOUS; SUBCUTANEOUS at 12:32

## 2018-03-26 RX ADMIN — INSULIN LISPRO SCH: 100 INJECTION, SOLUTION INTRAVENOUS; SUBCUTANEOUS at 12:31

## 2018-03-26 RX ADMIN — INSULIN LISPRO SCH: 100 INJECTION, SOLUTION INTRAVENOUS; SUBCUTANEOUS at 06:37

## 2018-03-26 RX ADMIN — PANTOPRAZOLE SODIUM SCH MG: 40 TABLET, DELAYED RELEASE ORAL at 08:05

## 2018-03-26 RX ADMIN — INSULIN LISPRO SCH: 100 INJECTION, SOLUTION INTRAVENOUS; SUBCUTANEOUS at 11:28

## 2018-03-26 RX ADMIN — WATER SCH MLS/HR: 1 INJECTION INTRAMUSCULAR; INTRAVENOUS; SUBCUTANEOUS at 04:43

## 2018-03-26 RX ADMIN — INSULIN LISPRO SCH: 100 INJECTION, SOLUTION INTRAVENOUS; SUBCUTANEOUS at 21:08

## 2018-03-26 RX ADMIN — ENOXAPARIN SODIUM SCH: 40 INJECTION SUBCUTANEOUS at 11:22

## 2018-03-26 RX ADMIN — INSULIN LISPRO SCH UNITS: 100 INJECTION, SOLUTION INTRAVENOUS; SUBCUTANEOUS at 06:51

## 2018-03-26 NOTE — CP.PCM.PN
Subjective





- Date & Time of Evaluation


Date of Evaluation: 03/25/18


Time of Evaluation: 10:30





- Subjective


Subjective: 





Patient was noted to have elevated WBC


 Has no cough


 Has no dysuria








Objective





- Vital Signs/Intake and Output


Vital Signs (last 24 hours): 


 











Temp Pulse Resp BP Pulse Ox


 


 99.3 F   86   20   122/70   91 L


 


 03/26/18 19:13  03/26/18 19:13  03/26/18 19:13  03/26/18 19:13  03/26/18 19:13











- Medications


Medications: 


 Current Medications





Acetaminophen (Tylenol 325mg Tab)  650 mg PO Q4 PRN


   PRN Reason: Fever >100.4 F


   Last Admin: 03/26/18 16:03 Dose:  650 mg


Gabapentin (Neurontin)  600 mg PO BID [facility]


   Last Admin: 03/26/18 16:46 Dose:  600 mg


Piperacillin Sod/Tazobactam (Sod 3.375 gm/ Sodium Chloride)  100 mls @ 100 mls/

hr IVPB Q8@0500,1300,2100 [facility]


   Last Admin: 03/26/18 21:16 Dose:  100 mls/hr


Vancomycin HCl 1 gm/ Sodium (Chloride)  250 mls @ 166.667 mls/hr IVPB Q12@0500,

1700 [facility]


   PRN Reason: Protocol


   Last Admin: 03/26/18 16:26 Dose:  166.667 mls/hr


Insulin Detemir (Levemir)  16 units SC HS [facility]


   Last Admin: 03/26/18 21:13 Dose:  16 units


Insulin Human Lispro (Humalog)  0 units SC ACHS [facility]


   Last Admin: 03/26/18 21:08 Dose:  Not Given


Mupirocin (Bactroban Ointment)  1 applic TOP BID [facility]


   Last Admin: 03/26/18 16:46 Dose:  1 applic


Pantoprazole Sodium (Protonix Ec Tab)  40 mg PO DAILY [facility]


   Last Admin: 03/26/18 08:05 Dose:  40 mg


Sitagliptin Phosphate (Januvia)  100 mg PO DAILY [facility]


Temazepam (Restoril)  15 mg PO HS PRN


   PRN Reason: Insomnia


   Last Admin: 03/25/18 02:12 Dose:  15 mg


Tramadol HCl (Ultram)  50 mg PO Q6 PRN


   PRN Reason: Pain, moderate (4-7)


   Last Admin: 03/26/18 21:09 Dose:  50 mg











- Labs


Labs: 


 





 03/26/18 12:20 





 03/26/18 12:20 











- Head Exam


Head Exam: NORMAL INSPECTION





- ENT Exam


ENT Exam: Mucous Membranes Moist





- Respiratory Exam


Respiratory Exam: Clear to Ausculation Bilateral





- Cardiovascular Exam


Cardiovascular Exam: REGULAR RHYTHM





- Neurological Exam


Neurological Exam: Awake





Assessment and Plan


(1) Diabetes mellitus type 2 in nonobese


Status: Acute   





(2) Sepsis


Status: Acute   





(3) Chronic ulcer of leg


Status: Chronic   





- Assessment and Plan (Free Text)


Plan: 





Cont meds


Cont PT


 recheck cbc


 IV antibiotics


 check C and S urine and blood

## 2018-03-26 NOTE — CP.PCM.PN
Subjective





- Date & Time of Evaluation


Date of Evaluation: 03/26/18


Time of Evaluation: 11:00





- Subjective


Subjective: 





Patient remains stable


Has no chest pain or SOB


Noted low grade fever


 Dr davison was informed








Objective





- Vital Signs/Intake and Output


Vital Signs (last 24 hours): 


 











Temp Pulse Resp BP Pulse Ox


 


 99.3 F   86   20   122/70   91 L


 


 03/26/18 19:13  03/26/18 19:13  03/26/18 19:13  03/26/18 19:13  03/26/18 19:13











- Medications


Medications: 


 Current Medications





Acetaminophen (Tylenol 325mg Tab)  650 mg PO Q4 PRN


   PRN Reason: Fever >100.4 F


   Last Admin: 03/26/18 16:03 Dose:  650 mg


Gabapentin (Neurontin)  600 mg PO BID [facility]


   Last Admin: 03/26/18 16:46 Dose:  600 mg


Piperacillin Sod/Tazobactam (Sod 3.375 gm/ Sodium Chloride)  100 mls @ 100 mls/

hr IVPB Q8@0500,1300,2100 [facility]


   Last Admin: 03/26/18 21:16 Dose:  100 mls/hr


Vancomycin HCl 1 gm/ Sodium (Chloride)  250 mls @ 166.667 mls/hr IVPB Q12@0500,

1700 [facility]


   PRN Reason: Protocol


   Last Admin: 03/26/18 16:26 Dose:  166.667 mls/hr


Insulin Detemir (Levemir)  16 units SC HS [facility]


   Last Admin: 03/26/18 21:13 Dose:  16 units


Insulin Human Lispro (Humalog)  0 units SC ACHS [facility]


   Last Admin: 03/26/18 21:08 Dose:  Not Given


Mupirocin (Bactroban Ointment)  1 applic TOP BID [facility]


   Last Admin: 03/26/18 16:46 Dose:  1 applic


Pantoprazole Sodium (Protonix Ec Tab)  40 mg PO DAILY [facility]


   Last Admin: 03/26/18 08:05 Dose:  40 mg


Sitagliptin Phosphate (Januvia)  100 mg PO DAILY [facility]


Temazepam (Restoril)  15 mg PO HS PRN


   PRN Reason: Insomnia


   Last Admin: 03/25/18 02:12 Dose:  15 mg


Tramadol HCl (Ultram)  50 mg PO Q6 PRN


   PRN Reason: Pain, moderate (4-7)


   Last Admin: 03/26/18 21:09 Dose:  50 mg











- Labs


Labs: 


 





 03/26/18 12:20 





 03/26/18 12:20 











- Head Exam


Head Exam: NORMAL INSPECTION





- Eye Exam


Eye Exam: Normal appearance





- Cardiovascular Exam


Cardiovascular Exam: REGULAR RHYTHM





- GI/Abdominal Exam


GI & Abdominal Exam: Normal Bowel Sounds





- Neurological Exam


Neurological Exam: Awake, Oriented x3





Assessment and Plan


(1) Diabetes mellitus type 2 in nonobese


Status: Acute   





(2) Sepsis


Status: Acute   





(3) Chronic ulcer of leg


Status: Chronic   





- Assessment and Plan (Free Text)


Plan: 





Cont meds


Cont tx


Cont PT


Iv antibiotics


 ID eval


 CXR

## 2018-03-26 NOTE — CP.PCM.PN
Subjective





- Date & Time of Evaluation


Date of Evaluation: 03/24/18


Time of Evaluation: 10:40





- Subjective


Subjective: 





Patient remains to have elevated FBS


Has no fever.


Doing well with PT.





Objective





- Vital Signs/Intake and Output


Vital Signs (last 24 hours): 


 











Temp Pulse Resp BP Pulse Ox


 


 99.3 F   86   20   122/70   91 L


 


 03/26/18 19:13  03/26/18 19:13  03/26/18 19:13  03/26/18 19:13  03/26/18 19:13











- Medications


Medications: 


 Current Medications





Acetaminophen (Tylenol 325mg Tab)  650 mg PO Q4 PRN


   PRN Reason: Fever >100.4 F


   Last Admin: 03/26/18 16:03 Dose:  650 mg


Gabapentin (Neurontin)  600 mg PO BID [facility]


   Last Admin: 03/26/18 16:46 Dose:  600 mg


Piperacillin Sod/Tazobactam (Sod 3.375 gm/ Sodium Chloride)  100 mls @ 100 mls/

hr IVPB Q8@0500,1300,2100 [facility]


   Last Admin: 03/26/18 21:16 Dose:  100 mls/hr


Vancomycin HCl 1 gm/ Sodium (Chloride)  250 mls @ 166.667 mls/hr IVPB Q12@0500,

1700 [facility]


   PRN Reason: Protocol


   Last Admin: 03/26/18 16:26 Dose:  166.667 mls/hr


Insulin Detemir (Levemir)  16 units SC HS [facility]


   Last Admin: 03/26/18 21:13 Dose:  16 units


Insulin Human Lispro (Humalog)  0 units SC ACHS [facility]


   Last Admin: 03/26/18 21:08 Dose:  Not Given


Mupirocin (Bactroban Ointment)  1 applic TOP BID [facility]


   Last Admin: 03/26/18 16:46 Dose:  1 applic


Pantoprazole Sodium (Protonix Ec Tab)  40 mg PO DAILY [facility]


   Last Admin: 03/26/18 08:05 Dose:  40 mg


Sitagliptin Phosphate (Januvia)  100 mg PO DAILY [facility]


Temazepam (Restoril)  15 mg PO HS PRN


   PRN Reason: Insomnia


   Last Admin: 03/25/18 02:12 Dose:  15 mg


Tramadol HCl (Ultram)  50 mg PO Q6 PRN


   PRN Reason: Pain, moderate (4-7)


   Last Admin: 03/26/18 21:09 Dose:  50 mg











- Labs


Labs: 


 





 03/26/18 12:20 





 03/26/18 12:20 











- Head Exam


Head Exam: NORMAL INSPECTION





- Eye Exam


Eye Exam: Normal appearance





- Respiratory Exam


Respiratory Exam: Clear to Ausculation Bilateral





- Cardiovascular Exam


Cardiovascular Exam: REGULAR RHYTHM





- GI/Abdominal Exam


GI & Abdominal Exam: Normal Bowel Sounds





- Neurological Exam


Neurological Exam: Awake, Oriented x3





Assessment and Plan


(1) Diabetes mellitus type 2 in nonobese


Status: Acute   





(2) Sepsis


Status: Acute   





(3) Chronic ulcer of leg


Status: Chronic   





- Assessment and Plan (Free Text)


Plan: 





Cont meds


Cont tx Cont PT


monitor cbc cmp

## 2018-03-26 NOTE — CP.PCM.PN
Subjective





- Date & Time of Evaluation


Date of Evaluation: 03/26/18


Time of Evaluation: 09:00





- Subjective


Subjective: 





fever this am


denies cough or diarrhea


alert oriented 


c/o pain left foot 





Objective





- Vital Signs/Intake and Output


Vital Signs (last 24 hours): 


 











Temp Pulse Resp BP Pulse Ox


 


 97.9 F   78   20   106/65   99 


 


 03/26/18 07:37  03/26/18 07:37  03/26/18 07:37  03/26/18 07:37  03/26/18 07:37











- Medications


Medications: 


 Current Medications





Enoxaparin Sodium (Lovenox)  40 mg SC DAILY [facility]


   PRN Reason: Protocol


   Last Admin: 03/25/18 08:39 Dose:  40 mg


Gabapentin (Neurontin)  600 mg PO BID [facility]


   Last Admin: 03/26/18 08:05 Dose:  600 mg


Piperacillin Sod/Tazobactam (Sod 3.375 gm/ Sodium Chloride)  100 mls @ 100 mls/

hr IVPB Q8@0500,1300,2100 [facility]


   Last Admin: 03/26/18 04:43 Dose:  100 mls/hr


Insulin Detemir (Levemir)  24 units SC HS [facility]


   Last Admin: 03/25/18 21:03 Dose:  24 units


Insulin Human Lispro (Humalog)  0 units SC ACHS [facility]


   Last Admin: 03/26/18 06:37 Dose:  Not Given


Insulin Human Lispro (Humalog)  6 units SC AC [facility]


   Last Admin: 03/26/18 06:51 Dose:  6 units


Mupirocin (Bactroban Ointment)  1 applic TOP BID [facility]


   Last Admin: 03/26/18 08:07 Dose:  1 applic


Pantoprazole Sodium (Protonix Ec Tab)  40 mg PO DAILY [facility]


   Last Admin: 03/26/18 08:05 Dose:  40 mg


Sitagliptin Phosphate (Januvia)  50 mg PO DAILY [facility]


   Last Admin: 03/26/18 08:05 Dose:  50 mg


Temazepam (Restoril)  15 mg PO HS PRN


   PRN Reason: Insomnia


   Last Admin: 03/25/18 02:12 Dose:  15 mg


Tramadol HCl (Ultram)  50 mg PO Q6 PRN


   PRN Reason: Pain, moderate (4-7)


   Last Admin: 03/26/18 08:16 Dose:  50 mg











- Labs


Labs: 


 





 03/25/18 07:39 





 03/25/18 07:39 











- Constitutional


Appears: Non-toxic, Chronically Ill





- Head Exam


Head Exam: NORMOCEPHALIC





- Eye Exam


Eye Exam: PERRL.  absent: Scleral icterus





- ENT Exam


ENT Exam: Mucous Membranes Dry





- Neck Exam


Neck Exam: absent: Lymphadenopathy





- Respiratory Exam


Respiratory Exam: Decreased Breath Sounds





- Cardiovascular Exam


Cardiovascular Exam: REGULAR RHYTHM





- GI/Abdominal Exam


GI & Abdominal Exam: Distended, Soft





- Rectal Exam


Rectal Exam: Deferred





-  Exam


 Exam: NORMAL INSPECTION





- Extremities Exam


Extremities Exam: Pedal Edema





- Back Exam


Back Exam: absent: CVA tenderness (L), CVA tenderness (R)





- Neurological Exam


Neurological Exam: Alert, Awake, Oriented x3


Neuro motor strength exam: Left Upper Extremity: 4, Right Upper Extremity: 4, 

Left Lower Extremity: 4, Right Lower Extremity: 4





- Psychiatric Exam


Psychiatric exam: Normal Mood





- Skin


Skin Exam: Dry


Additional comments: 











DERM: Necrotic eschar is becoming more fibrotic noted to dorsolateral left foot 

with periwound skin erosion - no fluctanance appreciated with palpation of the 

eschar. Mild periwound erythema surrounding entire eschar, no streaking 

appreciated, no malodor, no purulent drainage or drainage expressed. Increase 

in maceration noted to entire dorsolateral ulceration, no drainage expressed, 

no fluctance noted











Assessment and Plan





- Assessment and Plan (Free Text)


Assessment: 





66 yo male with large ulcer left foot and recent fever


no signs of systemic sepsis at this time


IV antibiotics in progress as well as wound care


will check lactate level 


given tylenol for fever


if fever recurs/ persists will need full septic work up CXR and possible re-

imaging of foot to r/o abscess

## 2018-03-26 NOTE — RAD
HISTORY:

elevated white count 17.9  



COMPARISON:

No prior.



TECHNIQUE:

Chest PA and lateral



FINDINGS:



LUNGS:

Airspace disease not excluded the bilateral lung bases.



PLEURA:

Small bilateral pleural effusions are now identified in the interval.



CARDIOVASCULAR:

Cardiac silhouette appears prominent and there is pulmonary vascular 

in toward engorgement of the hilar regions bilaterally, findings 

suggests interval active CHF.



OSSEOUS STRUCTURES:

No significant abnormalities.



VISUALIZED UPPER ABDOMEN:

Surgical clip again noted left upper quadrant abdomen.



OTHER FINDINGS:

None.



IMPRESSION:

Active CHF is identified with underlying potential bibasilar airspace 

disease.

## 2018-03-26 NOTE — CP.PCM.PN
Subjective





- Date & Time of Evaluation


Date of Evaluation: 03/26/18


Time of Evaluation: 13:00





- Subjective


Subjective: 





Podiatry Progress Note- Dr. Ascencio





67 y.o male seen and evaluated with attending at bedside for continued 

treatment of left foot ulceration. Patient is seen sitting in the chair at 

bedside, in NAD, and AA0x3. Patient reports having fevers last night. Reports 

less pain. Pain well managed by pain medication. Denies nausea, vomiting, 

shortness of breath, chest pains, or chills. Denies calf pain or tenderness. No 

new pedal complaints today. Patient reports has been walking with surgical shoe 

without problems. 





Objective





- Vital Signs/Intake and Output


Vital Signs (last 24 hours): 


 











Temp Pulse Resp BP Pulse Ox


 


 101.8 F H  91 H  20   101/65   90 L


 


 03/26/18 15:22  03/26/18 15:22  03/26/18 15:22  03/26/18 15:22  03/26/18 15:22











- Medications


Medications: 


 Current Medications





Acetaminophen (Tylenol 325mg Tab)  650 mg PO Q4 PRN


   PRN Reason: Fever >100.4 F


Gabapentin (Neurontin)  [facility]ID [facility]


Piperacillin Sod/Tazobactam (Sod 3.375 gm/ Sodium Chloride)  100 mls @ 100 mls/

hr IVPB Q8@0500,1300,[facility]


   Last Admin: 03/26/18 12:32 Dose:  100 mls/hr


Vancomycin HCl 1 gm/ Sodium (Chloride)  250 mls @ 166.667 mls/hr IVPB Q12@0500,

1700 [facility]


   PRN Reason: Protocol


Insulin Detemir (Levemir)  24 units SC HS [facility]


   Last Admin: 03/25/18 21:03 Dose:  24 units


Insulin Human Lispro (Humalog)  0 units SC ACHS [facility]


   Last Admin: 03/26/18 11:28 Dose:  Not Given


Insulin Human Lispro (Humalog)  6 units SC AC [facility]


   Last Admin: 03/26/18 12:31 Dose:  Not Given


Mupirocin (Bactroban Ointment)  1 applic TOP [facility]ID [facility]


   Last Admin: 03/26/18 08:07 Dose:  1 applic


Pantoprazole Sodium (Protonix Ec Tab)  40 mg PO DAILY [facility]


   Last Admin: 03/26/18 08:05 Dose:  40 mg


Sitagliptin Phosphate (Januvia)  50 mg PO DAILY [facility]


   Last Admin: 03/26/18 08:05 Dose:  50 mg


Temazepam (Restoril)  15 mg PO HS PRN


   PRN Reason: Insomnia


   Last Admin: 03/25/18 02:12 Dose:  15 mg


Tramadol HCl (Ultram)  50 mg PO Q6 PRN


   PRN Reason: Pain, moderate (4-7)


   Last Admin: 03/26/18 08:16 Dose:  50 mg











- Labs


Labs: 


 





 03/26/18 12:20 





 03/26/18 12:20 











- Constitutional


Appears: Well, Non-toxic, No Acute Distress





- Extremities Exam


Extremities Exam: absent: Calf Tenderness


Additional comments: 





VASC: LLE DP and PT pulses nonpalpable. RLE DP pulse weakly palpable 1/4, PT 

nonpalpable. CFT <3 seconds to all digits x10. Mild nonpitting edmea noted to 

left foot, most prominent on the plantar aspect of the medial arch





NEURO: Protective and gross sensation diminished





DERM: Necrotic eschar is becoming more fibrotic noted to dorsolateral left foot 

with periwound skin erosion and detachment- no notable fluctanance appreciated 

with palpation of the eschar. Majority of the wound most likely to level of skin

, with portion of ulceration likely down to level of tendon. Mild periwound 

erythema surrounding entire eschar, no streaking appreciated, no malodor, no 

purulent drainage or drainage expressed. Maceration noted to entire 

dorsolateral ulceration, no drainage expressed, no fluctance noted











ORTHO: With palpation, very mild pain elicited. LLE ankle joint ROM limited





- Neurological Exam


Neurological Exam: Alert, Awake, Oriented x3





- Psychiatric Exam


Psychiatric exam: Normal Affect, Normal Mood





Assessment and Plan





- Assessment and Plan (Free Text)


Assessment: 





67M with unstageable necrotic eschar to dorsum of left foot, appearing more 

fibrotic


Plan: 








-Patient seen and evaluated at bedside with attending Dr. Ascencio


-Febrile, WBC 17.9


-Discussed plan in detail with attending Dr. Ascencio


-Tib/fib xrays ordered and reviewed; negative for soft tissue emphysema


-MRI shows no OM; Dorsal cellulitis with plantar foot myositis in trace fluid 

underlying plantar fascia though chronic fasciitis is not excluded at posterior 

plantar fascia


-Wound cleansed with betadine, dressed with gauze, ABD, kerlix


-Eschar is becoming more fibrous and macerated and detaching to the periwound


   -Will order Santyl to apply to wounds tomorrow


   -Wound likely to level of the skin with some portion likely to level of 

tendon


   -Santyl will gently debride ulceration


-Need to r/o other sources of elevated WBC; foot appears clinically stable, no 

abscess appreciated


-If persist fever persist and Santyl does not clinically improve wound, may 

bring patient to the OR for a more aggressive wound debridement 


-Will closely monitor


-Multipodus boots placed b/l


-Patient may WBAT to the heels to the left foot in surgical shoe


-Continue IV abx per ID


-Podiatry will continue to follow while patient in house

## 2018-03-27 LAB
BACTERIA #/AREA URNS HPF: (no result) /[HPF]
BILIRUB UR-MCNC: NEGATIVE MG/DL
BUN SERPL-MCNC: 12 MG/DL (ref 9–20)
CALCIUM SERPL-MCNC: 8.5 MG/DL (ref 8.4–10.2)
COLOR UR: YELLOW
ERYTHROCYTE [DISTWIDTH] IN BLOOD BY AUTOMATED COUNT: 15.9 % (ref 11.5–14.5)
GFR NON-AFRICAN AMERICAN: > 60
GLUCOSE UR STRIP-MCNC: >=500 MG/DL
HGB BLD-MCNC: 8.2 G/DL (ref 12–18)
LEUKOCYTE ESTERASE UR-ACNC: (no result) LEU/UL
MCH RBC QN AUTO: 26.4 PG (ref 27–31)
MCHC RBC AUTO-ENTMCNC: 32.2 G/DL (ref 33–37)
MCV RBC AUTO: 81.7 FL (ref 80–94)
PH UR STRIP: 5 [PH] (ref 5–8)
PLATELET # BLD: 582 K/UL (ref 130–400)
PROT UR STRIP-MCNC: 30 MG/DL
RBC # BLD AUTO: 3.11 MIL/UL (ref 4.4–5.9)
RBC # UR STRIP: NEGATIVE /UL
SP GR UR STRIP: 1.02 (ref 1–1.03)
SQUAMOUS EPITHIAL: < 1 /HPF (ref 0–5)
URINE CLARITY: (no result)
UROBILINOGEN UR-MCNC: 2 MG/DL (ref 0.2–1)
WBC # BLD AUTO: 13.8 K/UL (ref 4.8–10.8)

## 2018-03-27 RX ADMIN — WATER SCH MLS/HR: 1 INJECTION INTRAMUSCULAR; INTRAVENOUS; SUBCUTANEOUS at 21:41

## 2018-03-27 RX ADMIN — WATER SCH MLS/HR: 1 INJECTION INTRAMUSCULAR; INTRAVENOUS; SUBCUTANEOUS at 13:05

## 2018-03-27 RX ADMIN — WATER SCH MLS/HR: 1 INJECTION INTRAMUSCULAR; INTRAVENOUS; SUBCUTANEOUS at 04:24

## 2018-03-27 RX ADMIN — INSULIN LISPRO SCH: 100 INJECTION, SOLUTION INTRAVENOUS; SUBCUTANEOUS at 06:49

## 2018-03-27 RX ADMIN — INSULIN LISPRO SCH: 100 INJECTION, SOLUTION INTRAVENOUS; SUBCUTANEOUS at 11:20

## 2018-03-27 RX ADMIN — INSULIN LISPRO SCH UNITS: 100 INJECTION, SOLUTION INTRAVENOUS; SUBCUTANEOUS at 17:11

## 2018-03-27 RX ADMIN — COLLAGENASE SANTYL SCH: 250 OINTMENT TOPICAL at 10:05

## 2018-03-27 RX ADMIN — INSULIN LISPRO SCH: 100 INJECTION, SOLUTION INTRAVENOUS; SUBCUTANEOUS at 21:35

## 2018-03-27 RX ADMIN — INSULIN LISPRO SCH: 100 INJECTION, SOLUTION INTRAVENOUS; SUBCUTANEOUS at 16:49

## 2018-03-27 RX ADMIN — INSULIN DETEMIR SCH UNITS: 100 INJECTION, SOLUTION SUBCUTANEOUS at 21:41

## 2018-03-27 RX ADMIN — PANTOPRAZOLE SODIUM SCH MG: 40 TABLET, DELAYED RELEASE ORAL at 08:23

## 2018-03-27 NOTE — PN
DATE:



ENDO FOLLOWUP NOTE



LOCATION:  [address] [place] [address].



SUBJECTIVE:  This is a 67-year-old male with recent uncontrolled type 2

insulin-requiring diabetes, now being followed closely for metabolic

management.  His glycemic levels are fluctuating, but much improved at this

time and apparently because of improved oral intake, hyperglycemic levels

are supervened overnight as noted and the glucose values have ranged from

217 mg/dL to 289 mg/dL.  His latest chemistry showed BUN of 12, sodium of

135, potassium of 4.4, chloride of 96, CO2 of 29, glucose of 196, and

creatinine of 0.9.  So at this time, we will restart his Humalog given as 4

units subcu t.i.d. before meals to start today as ordered.  We will

continue the Levemir given at a higher dose of _____ units subcu at bedtime

daily to start tonight.  We will continue the Januvia given as 100 mg once

daily in the morning as ordered.  We will also continue the low-dose

correction scale using Humalog insulin as given.  We will follow.





__________________________________________

Bernie Collins MD





DD:  03/27/2018 15:28:44

DT:  03/27/2018 15:30:41

Job # [id number]

## 2018-03-27 NOTE — PN
DATE:  03/26/2019



ENDO FOLLOWUP NOTE



LOCATION:  In [facility], room 708.



This is the 67-year-old male with recent uncontrolled type 2

insulin-requiring diabetes, now being followed closely for metabolic

management.  His oral intake remains quite variable at this time with

supervening hypoglycemic episodes today with glucose levels down to 72

mg/dL and a repeat value of 214 tonight as noted.  The latest chemistry

showed a BUN of 13, sodium 139, potassium 4.5, chloride 97, CO2 of 29,

glucose 98 and creatinine 1.1.  So at this time, we will actually

discontinue his Humalog insulin given as 6 units t.i.d. before meals as

ordered.  We will increase his Januvia to 100 mg once daily in the morning

as given.  We will also lower the basal insulin to Levemir down to 16 units

subcutaneous at bedtime daily as given.  We will titrate incremental as

indicated to optimize metabolic control.  We will obtain serial chemistries

and supplement accordingly needed.  We will follow.





__________________________________________

Bernie Collins MD



DD:  03/26/2018 22:30:05

DT:  03/26/2018 22:32:28

Job # [id number]

## 2018-03-27 NOTE — CP.PCM.PN
Subjective





- Date & Time of Evaluation


Date of Evaluation: 03/27/18


Time of Evaluation: 11:00





- Subjective


Subjective: 





Podiatry Progress Note- Dr. Ascencio





67 y.o male seen and evaluated at bedside for continued treatment of left foot 

ulceration. Patient is seen sitting in bed and in NAD. Patient seen after 

returning from physical therapy. Patient denies fevers last night. Reports 

little pain to the foot. Pain well managed by pain medication. Denies nausea, 

vomiting, shortness of breath, chest pains, or chills. Denies calf pain or 

tenderness. No new pedal complaints today. 





Objective





- Vital Signs/Intake and Output


Vital Signs (last 24 hours): 


 











Temp Pulse Resp BP Pulse Ox


 


 97.9 F   87   20   126/80   88 L


 


 03/27/18 08:00  03/27/18 08:00  03/27/18 08:00  03/27/18 08:00  03/27/18 08:48











- Medications


Medications: 


 Current Medications





Acetaminophen (Tylenol 325mg Tab)  650 mg PO Q4 PRN


   PRN Reason: Fever >100.4 F


   Last Admin: 03/26/18 16:03 Dose:  650 mg


Collagenase (Santyl)  1 applic TOP DAILY [facility]


   Last Admin: 03/27/18 10:05 Dose:  Not Given


Gabapentin (Neurontin)  600 mg PO BID [facility]


   Last Admin: 03/27/18 08:23 Dose:  600 mg


Piperacillin Sod/Tazobactam (Sod 3.375 gm/ Sodium Chloride)  100 mls @ 100 mls/

hr IVPB Q8@0500,1300,2100 [facility]


   Last Admin: 03/27/18 04:24 Dose:  100 mls/hr


Vancomycin HCl 1 gm/ Sodium (Chloride)  250 mls @ 166.667 mls/hr IVPB Q12@0500,

1700 [facility]


   PRN Reason: Protocol


   Last Admin: 03/27/18 05:24 Dose:  166.667 mls/hr


Insulin Detemir (Levemir)  16 units SC HS [facility]


   Last Admin: 03/26/18 21:13 Dose:  16 units


Insulin Human Lispro (Humalog)  0 units SC ACHS [facility]


   Last Admin: 03/27/18 11:20 Dose:  Not Given


Mupirocin (Bactroban Ointment)  1 applic TOP DAILY [facility]


Pantoprazole Sodium (Protonix Ec Tab)  40 mg PO DAILY [facility]


   Last Admin: 03/27/18 08:23 Dose:  40 mg


Sitagliptin Phosphate (Januvia)  100 mg PO DAILY@0800 [facility]


   Last Admin: 03/27/18 07:58 Dose:  Not Given


Temazepam (Restoril)  15 mg PO HS PRN


   PRN Reason: Insomnia


   Last Admin: 03/25/18 02:12 Dose:  15 mg


Tramadol HCl (Ultram)  50 mg PO Q6 PRN


   PRN Reason: Pain, moderate (4-7)


   Last Admin: 03/26/18 21:09 Dose:  50 mg











- Labs


Labs: 


 





 03/27/18 05:45 





 03/27/18 05:45 











- Constitutional


Appears: Well, Non-toxic, No Acute Distress





- Extremities Exam


Extremities Exam: absent: Calf Tenderness


Additional comments: 





VASC: LLE DP and PT pulses nonpalpable. RLE DP pulse weakly palpable 1/4, PT 

nonpalpable. CFT <3 seconds to all digits x10. Digital hair is present. Mild 

nonpitting edmea noted to left foot, most prominent on the plantar aspect of 

the medial arch, temperature to the LE is warm to warm bilaterally





NEURO: Protective and gross sensation diminished





DERM: Necrotic eschar is becoming more fibrotic noted to dorsolateral left foot 

with periwound skin erosion and detachment- no notable fluctanance appreciated 

with palpation of the eschar. Majority of the wound most likely to level of skin

, with portion of ulceration likely down to level of tendon. Mild periwound 

erythema surrounding entire eschar, no streaking appreciated, no malodor, no 

purulent drainage or drainage expressed. Less maceration noted to entire 

dorsolateral ulceratio compared to yesterday n, no drainage expressed, no 

fluctance noted





ORTHO: mild pain with palpation to the left foot





- Neurological Exam


Neurological Exam: Alert, Awake, Oriented x3





- Psychiatric Exam


Psychiatric exam: Normal Affect, Normal Mood





Assessment and Plan





- Assessment and Plan (Free Text)


Assessment: 





67M with unstageable necrotic eschar to dorsum of left foot, appearing more 

fibrotic


Plan: 











-Patient seen and evaluated 


-Afebrile today, WBC 13.8 today down from 17.9 yesterday


-Discussed plan in detail with attending Dr. Ascencio


-Tib/fib xrays ordered and reviewed; negative for soft tissue emphysema


-MRI shows no OM; Dorsal cellulitis with plantar foot myositis in trace fluid 

underlying plantar fascia though chronic fasciitis is not excluded at posterior 

plantar fascia


-Wound cleansed with saline, Santly applied, dressed with gauze, ABD, kerlix


-Eschar is becoming more fibrous and macerated and detaching to the periwound


   -Will order Santyl to apply to wounds tomorrow


   -Wound likely to level of the skin with some portion likely to level of 

tendon


   -Santyl will gently debride ulceration


-Need to r/o other sources of elevated WBC; foot appears clinically stable, no 

abscess appreciated


-If persist fever persist and Santyl does not clinically improve wound, may 

bring patient to the OR for a more aggressive wound debridement 


-Will closely monitor


-Multipodus boots placed b/l


-Patient may WBAT to the heels to the left foot in surgical shoe


-Continue IV abx per ID


-Podiatry will continue to follow while patient in house

## 2018-03-27 NOTE — CP.PCM.PN
Subjective





- Date & Time of Evaluation


Date of Evaluation: 03/27/18


Time of Evaluation: 07:00





- Subjective


Subjective: 





t max down


Vanco added


consider imaging


podiatry eval





Objective





- Vital Signs/Intake and Output


Vital Signs (last 24 hours): 


 











Temp Pulse Resp BP Pulse Ox


 


 97.9 F   87   20   126/80   88 L


 


 03/27/18 08:00  03/27/18 08:00  03/27/18 08:00  03/27/18 08:00  03/27/18 08:48











- Medications


Medications: 


 Current Medications





Acetaminophen (Tylenol 325mg Tab)  650 mg PO Q4 PRN


   PRN Reason: Fever >100.4 F


   Last Admin: 03/26/18 16:03 Dose:  650 mg


Collagenase (Santyl)  1 applic TOP DAILY [facility]


   Last Admin: 03/27/18 10:05 Dose:  Not Given


Gabapentin (Neurontin)  600 mg PO BID [facility]


   Last Admin: 03/27/18 08:23 Dose:  600 mg


Piperacillin Sod/Tazobactam (Sod 3.375 gm/ Sodium Chloride)  100 mls @ 100 mls/

hr IVPB Q8@0500,1300,2100 [facility]


   Last Admin: 03/27/18 04:24 Dose:  100 mls/hr


Vancomycin HCl 1 gm/ Sodium (Chloride)  250 mls @ 166.667 mls/hr IVPB Q12@0500,

1700 [facility]


   PRN Reason: Protocol


   Last Admin: 03/27/18 05:24 Dose:  166.667 mls/hr


Insulin Detemir (Levemir)  16 units SC HS [facility]


   Last Admin: 03/26/18 21:13 Dose:  16 units


Insulin Human Lispro (Humalog)  0 units SC ACHS [facility]


   Last Admin: 03/27/18 06:49 Dose:  Not Given


Mupirocin (Bactroban Ointment)  1 applic TOP DAILY [facility]


Pantoprazole Sodium (Protonix Ec Tab)  40 mg PO DAILY [facility]


   Last Admin: 03/27/18 08:23 Dose:  40 mg


Sitagliptin Phosphate (Januvia)  100 mg PO DAILY@0800 [facility]


   Last Admin: 03/27/18 07:58 Dose:  Not Given


Temazepam (Restoril)  15 mg PO HS PRN


   PRN Reason: Insomnia


   Last Admin: 03/25/18 02:12 Dose:  15 mg


Tramadol HCl (Ultram)  50 mg PO Q6 PRN


   PRN Reason: Pain, moderate (4-7)


   Last Admin: 03/26/18 21:09 Dose:  50 mg











- Labs


Labs: 


 





 03/27/18 05:45 





 03/27/18 05:45 











- Constitutional


Appears: Non-toxic





- Head Exam


Head Exam: NORMOCEPHALIC





- Eye Exam


Eye Exam: PERRL





- ENT Exam


ENT Exam: Mucous Membranes Dry





- Neck Exam


Neck Exam: absent: Lymphadenopathy





- Respiratory Exam


Respiratory Exam: Decreased Breath Sounds





- Cardiovascular Exam


Cardiovascular Exam: REGULAR RHYTHM





- GI/Abdominal Exam


GI & Abdominal Exam: Distended





- Rectal Exam


Rectal Exam: Deferred





-  Exam


 Exam: NORMAL INSPECTION





Assessment and Plan





- Assessment and Plan (Free Text)


Plan: 





add vanco


cont ewound care 


podiatry re-eval

## 2018-03-27 NOTE — CP.PCM.PN
Subjective





- Date & Time of Evaluation


Date of Evaluation: 03/27/18


Time of Evaluation: 11:35





- Subjective


Subjective: 





Stable. Had low grade fever Yesterday. No acute events overnight. denies dysuria

, SOB, palpitations, diarrhea, CP, headache. 





Objective





- Vital Signs/Intake and Output


Vital Signs (last 24 hours): 


 











Temp Pulse Resp BP Pulse Ox


 


 97.9 F   87   20   126/80   88 L


 


 03/27/18 08:00  03/27/18 08:00  03/27/18 08:00  03/27/18 08:00  03/27/18 08:48











- Medications


Medications: 


 Current Medications





Acetaminophen (Tylenol 325mg Tab)  650 mg PO Q4 PRN


   PRN Reason: Fever >100.4 F


   Last Admin: 03/26/18 16:03 Dose:  650 mg


Collagenase (Santyl)  1 applic TOP DAILY [facility]


   Last Admin: 03/27/18 10:05 Dose:  Not Given


Gabapentin (Neurontin)  600 mg PO BID [facility]


   Last Admin: 03/27/18 08:23 Dose:  600 mg


Piperacillin Sod/Tazobactam (Sod 3.375 gm/ Sodium Chloride)  100 mls @ 100 mls/

hr IVPB Q8@0500,1300,2100 [facility]


   Last Admin: 03/27/18 04:24 Dose:  100 mls/hr


Vancomycin HCl 1 gm/ Sodium (Chloride)  250 mls @ 166.667 mls/hr IVPB Q12@0500,

1700 [facility]


   PRN Reason: Protocol


   Last Admin: 03/27/18 05:24 Dose:  166.667 mls/hr


Insulin Detemir (Levemir)  16 units SC HS [facility]


   Last Admin: 03/26/18 21:13 Dose:  16 units


Insulin Human Lispro (Humalog)  0 units SC ACHS [facility]


   Last Admin: 03/27/18 11:20 Dose:  Not Given


Mupirocin (Bactroban Ointment)  1 applic TOP DAILY [facility]


Pantoprazole Sodium (Protonix Ec Tab)  40 mg PO DAILY [facility]


   Last Admin: 03/27/18 08:23 Dose:  40 mg


Sitagliptin Phosphate (Januvia)  100 mg PO DAILY@0800 [facility]


   Last Admin: 03/27/18 07:58 Dose:  Not Given


Temazepam (Restoril)  15 mg PO HS PRN


   PRN Reason: Insomnia


   Last Admin: 03/25/18 02:12 Dose:  15 mg


Tramadol HCl (Ultram)  50 mg PO Q6 PRN


   PRN Reason: Pain, moderate (4-7)


   Last Admin: 03/26/18 21:09 Dose:  50 mg











- Labs


Labs: 


 





 03/27/18 05:45 





 03/27/18 05:45 











- Constitutional


Appears: Non-toxic, No Acute Distress





- Eye Exam


Eye Exam: EOMI, PERRL





- ENT Exam


ENT Exam: Mucous Membranes Moist





- Respiratory Exam


Respiratory Exam: Clear to Ausculation Bilateral, NORMAL BREATHING PATTERN.  

absent: Decreased Breath Sounds, Rales, Rhonchi, Wheezes, Respiratory Distress, 

Stridor





- Cardiovascular Exam


Cardiovascular Exam: REGULAR RHYTHM, +S1, +S2.  absent: Gallop





- GI/Abdominal Exam


GI & Abdominal Exam: Soft, Normal Bowel Sounds.  absent: Tenderness, Hernia, 

Rebound





- Extremities Exam


Extremities Exam: Tenderness (LLE. L/foot covered with dressing georgi tis clean 

and dry.)





- Neurological Exam


Neurological Exam: Alert, Awake, Oriented x3





- Psychiatric Exam


Psychiatric exam: Normal Affect, Normal Mood





- Skin


Skin Exam: Warm





Assessment and Plan





- Assessment and Plan (Free Text)


Assessment: 





Left foot infected wound


Fever yesterday: F/U repeat UCx, BCx and WCx


ESR and Lactate elevated yesterday


ID consult appreciated: c/W Zosyn, started on Vanco IV


VS stable


WBC improved since yesterday


Patient doesnt look septic


For Echocardiogram and PICC line placement


Will order RF and KADE


Podiatry consult appreciated

## 2018-03-28 ENCOUNTER — HOSPITAL ENCOUNTER (OUTPATIENT)
Dept: HOSPITAL 14 - H.OPSURG | Age: 68
Discharge: NURSING, REHAB OR LONG-TERM CARE | End: 2018-03-28
Attending: FAMILY MEDICINE
Payer: MEDICARE

## 2018-03-28 VITALS
TEMPERATURE: 97.9 F | HEART RATE: 81 BPM | OXYGEN SATURATION: 94 % | SYSTOLIC BLOOD PRESSURE: 107 MMHG | DIASTOLIC BLOOD PRESSURE: 61 MMHG

## 2018-03-28 VITALS — BODY MASS INDEX: 26.3 KG/M2

## 2018-03-28 VITALS — RESPIRATION RATE: 18 BRPM

## 2018-03-28 DIAGNOSIS — L08.89: Primary | ICD-10-CM

## 2018-03-28 LAB
BASOPHILS # BLD AUTO: 0.1 K/UL (ref 0–0.2)
BASOPHILS NFR BLD: 0.5 % (ref 0–2)
EOSINOPHIL # BLD AUTO: 0.6 K/UL (ref 0–0.7)
EOSINOPHIL NFR BLD: 4.9 % (ref 0–4)
ERYTHROCYTE [DISTWIDTH] IN BLOOD BY AUTOMATED COUNT: 15.9 % (ref 11.5–14.5)
HGB BLD-MCNC: 8.4 G/DL (ref 12–18)
LYMPHOCYTES # BLD AUTO: 1.8 K/UL (ref 1–4.3)
LYMPHOCYTES NFR BLD AUTO: 14.9 % (ref 20–40)
MCH RBC QN AUTO: 26.6 PG (ref 27–31)
MCHC RBC AUTO-ENTMCNC: 32.6 G/DL (ref 33–37)
MCV RBC AUTO: 81.7 FL (ref 80–94)
MONOCYTES # BLD: 1.4 K/UL (ref 0–0.8)
MONOCYTES NFR BLD: 11.3 % (ref 0–10)
NEUTROPHILS # BLD: 8.3 K/UL (ref 1.8–7)
NEUTROPHILS NFR BLD AUTO: 68.4 % (ref 50–75)
NRBC BLD AUTO-RTO: 0.2 % (ref 0–0)
PLATELET # BLD: 578 K/UL (ref 130–400)
PMV BLD AUTO: 11.7 FL (ref 7.2–11.7)
RBC # BLD AUTO: 3.15 MIL/UL (ref 4.4–5.9)
WBC # BLD AUTO: 12.1 K/UL (ref 4.8–10.8)

## 2018-03-28 PROCEDURE — 76937 US GUIDE VASCULAR ACCESS: CPT

## 2018-03-28 PROCEDURE — 36569 INSJ PICC 5 YR+ W/O IMAGING: CPT

## 2018-03-28 PROCEDURE — 77001 FLUOROGUIDE FOR VEIN DEVICE: CPT

## 2018-03-28 RX ADMIN — WATER SCH MLS/HR: 1 INJECTION INTRAMUSCULAR; INTRAVENOUS; SUBCUTANEOUS at 04:07

## 2018-03-28 RX ADMIN — WATER SCH MLS/HR: 1 INJECTION INTRAMUSCULAR; INTRAVENOUS; SUBCUTANEOUS at 21:55

## 2018-03-28 RX ADMIN — COLLAGENASE SANTYL SCH: 250 OINTMENT TOPICAL at 08:06

## 2018-03-28 RX ADMIN — INSULIN LISPRO SCH UNITS: 100 INJECTION, SOLUTION INTRAVENOUS; SUBCUTANEOUS at 12:08

## 2018-03-28 RX ADMIN — INSULIN LISPRO SCH UNITS: 100 INJECTION, SOLUTION INTRAVENOUS; SUBCUTANEOUS at 08:17

## 2018-03-28 RX ADMIN — INSULIN LISPRO SCH: 100 INJECTION, SOLUTION INTRAVENOUS; SUBCUTANEOUS at 08:06

## 2018-03-28 RX ADMIN — INSULIN LISPRO SCH: 100 INJECTION, SOLUTION INTRAVENOUS; SUBCUTANEOUS at 11:17

## 2018-03-28 RX ADMIN — WATER SCH MLS/HR: 1 INJECTION INTRAMUSCULAR; INTRAVENOUS; SUBCUTANEOUS at 12:12

## 2018-03-28 RX ADMIN — INSULIN LISPRO SCH: 100 INJECTION, SOLUTION INTRAVENOUS; SUBCUTANEOUS at 21:52

## 2018-03-28 RX ADMIN — INSULIN LISPRO SCH UNITS: 100 INJECTION, SOLUTION INTRAVENOUS; SUBCUTANEOUS at 17:03

## 2018-03-28 RX ADMIN — PANTOPRAZOLE SODIUM SCH MG: 40 TABLET, DELAYED RELEASE ORAL at 08:17

## 2018-03-28 RX ADMIN — INSULIN DETEMIR SCH UNITS: 100 INJECTION, SOLUTION SUBCUTANEOUS at 21:55

## 2018-03-28 RX ADMIN — INSULIN LISPRO SCH: 100 INJECTION, SOLUTION INTRAVENOUS; SUBCUTANEOUS at 16:42

## 2018-03-28 NOTE — PN
DATE:



ENDO FOLLOWUP NOTE



LOCATION:  [facility], room 708.



SUBJECTIVE:  This is a 67-year-old male with recent uncontrolled type 2

insulin-requiring diabetes, now being followed closely for metabolic

management.  His glucose levels were near optimal last night till this

morning and then at noon time because of very nil oral intake at lunch, his

glucose dropped to 55 mg/dL.  The latest glucose levels otherwise this

morning ranged from 129-168 and 207 mg/dL.  The latest chemistry showed BUN

of 12, sodium of 135, potassium of 4.4, chloride of 96, CO2 of 29, glucose

of 196, and creatinine of 0.9.  So at this time, we will continue the same

modified basal and bolus insulin regimen to allow for dose equilibration

and keep him on the Humalog given as 4 units subcu t.i.d. before meals as

ordered.  We will continue the Levemir given as 18 units subcu at bedtime

daily as given.  He is also on Januvia given as 100 mg daily as ordered. 

We will continue the low-dose correction scale using Humalog insulin as

given.  We will follow and advice accordingly.





__________________________________________

Bernie Collins MD





DD:  03/28/2018 15:41:35

DT:  03/28/2018 15:46:09

Job # [id number]

## 2018-03-28 NOTE — CP.PCM.PN
Subjective





- Date & Time of Evaluation


Date of Evaluation: 03/28/18


Time of Evaluation: 08:15





- Subjective


Subjective: 





Feeling "great". Stable. No acute events overnight. Echo results reviewed. 

Denies changes in urination or stools. Denies CP, palpitations, SOB, abd pain. 

Afebrile





Objective





- Vital Signs/Intake and Output


Vital Signs (last 24 hours): 


 











Temp Pulse Resp BP Pulse Ox


 


 98.9 F   82   18   114/66   98 


 


 03/28/18 07:52  03/28/18 07:52  03/28/18 07:52  03/28/18 07:52  03/28/18 07:52











- Medications


Medications: 


 Current Medications





Acetaminophen (Tylenol 325mg Tab)  650 mg PO Q4 PRN


   PRN Reason: Fever >100.4 F


   Last Admin: 03/26/18 16:03 Dose:  650 mg


Collagenase (Santyl)  1 applic TOP DAILY [facility]


   Last Admin: 03/28/18 08:06 Dose:  Not Given


Gabapentin (Neurontin)  600 mg PO BID [facility]


   Last Admin: 03/28/18 08:17 Dose:  600 mg


Piperacillin Sod/Tazobactam (Sod 3.375 gm/ Sodium Chloride)  100 mls @ 100 mls/

hr IVPB Q8@0500,1300,2100 [facility]


   Last Admin: 03/28/18 04:07 Dose:  100 mls/hr


Vancomycin HCl 1 gm/ Sodium (Chloride)  250 mls @ 166.667 mls/hr IVPB Q12@0500,

1700 [facility]


   PRN Reason: Protocol


   Last Admin: 03/28/18 04:07 Dose:  166.667 mls/hr


Insulin Detemir (Levemir)  18 units SC HS [facility]


   Last Admin: 03/27/18 21:41 Dose:  18 units


Insulin Human Lispro (Humalog)  0 units SC ACHS [facility]


   Last Admin: 03/28/18 08:06 Dose:  Not Given


Insulin Human Lispro (Humalog)  4 units SC AC [facility]


   Last Admin: 03/28/18 08:17 Dose:  4 units


Losartan Potassium (Cozaar)  25 mg PO DAILY [facility]


Mupirocin (Bactroban Ointment)  1 applic TOP DAILY [facility]


   Last Admin: 03/28/18 08:16 Dose:  1 applic


Pantoprazole Sodium (Protonix Ec Tab)  40 mg PO DAILY [facility]


   Last Admin: 03/28/18 08:17 Dose:  40 mg


Sitagliptin Phosphate (Januvia)  100 mg PO DAILY@0800 [facility]


   Last Admin: 03/28/18 08:17 Dose:  100 mg


Temazepam (Restoril)  15 mg PO HS PRN


   PRN Reason: Insomnia


   Last Admin: 03/25/18 02:12 Dose:  15 mg


Tramadol HCl (Ultram)  50 mg PO Q6 PRN


   PRN Reason: Pain, moderate (4-7)


   Last Admin: 03/26/18 21:09 Dose:  50 mg











- Labs


Labs: 


 





 03/28/18 05:40 





 03/27/18 05:45 











- Constitutional


Appears: Non-toxic, No Acute Distress





- Eye Exam


Eye Exam: EOMI, PERRL





- ENT Exam


ENT Exam: Mucous Membranes Moist





- Respiratory Exam


Respiratory Exam: NORMAL BREATHING PATTERN.  absent: Chest Wall Tenderness, 

Decreased Breath Sounds, Rales, Rhonchi, Wheezes, Respiratory Distress





- Cardiovascular Exam


Cardiovascular Exam: REGULAR RHYTHM, +S1, +S2.  absent: Gallop





- GI/Abdominal Exam


GI & Abdominal Exam: Soft, Normal Bowel Sounds.  absent: Distended, Firm, 

Guarding, Rigid, Tenderness, Rebound





- Extremities Exam


Extremities Exam: Normal Capillary Refill.  absent: Calf Tenderness, Normal 

Inspection (left foot covered with dressing, clean and dry), Pedal Edema





- Neurological Exam


Neurological Exam: Alert, Awake, Oriented x3





- Psychiatric Exam


Psychiatric exam: Normal Affect, Normal Mood





- Skin


Skin Exam: Warm.  absent: Pallor, Rash





Assessment and Plan





- Assessment and Plan (Free Text)


Assessment: 





left foot infected wound


Vanco through 24


Will hold next dose of vanco


ID recs appreciated


Echo shows low EF 40-45%(Systolic CHF)


No pedal edema or rales on PE


Patient's o2sat less than 95 at times


Lasix 20 mg PO once


Start Losartan 25 mg daily


Anemia stable

## 2018-03-28 NOTE — CP.PCM.PN
Subjective





- Date & Time of Evaluation


Date of Evaluation: 03/28/18


Time of Evaluation: 09:00





- Subjective


Subjective: 





recent bout of sepsis improving after adding Vanco


left foot only source


reccomend repeat MRI


may need 6 weeks IV rx as wbc esr still elevated


consider BRYNN placenment 





Objective





- Vital Signs/Intake and Output


Vital Signs (last 24 hours): 


 











Temp Pulse Resp BP Pulse Ox


 


 98.9 F   94 H  18   110/67   93 L


 


 03/28/18 07:52  03/28/18 14:55  03/28/18 07:52  03/28/18 12:11  03/28/18 14:55











- Medications


Medications: 


 Current Medications





Acetaminophen (Tylenol 325mg Tab)  650 mg PO Q4 PRN


   PRN Reason: Fever >100.4 F


   Last Admin: 03/26/18 16:03 Dose:  650 mg


Collagenase (Santyl)  1 applic TOP DAILY [facility]


   Last Admin: 03/28/18 08:06 Dose:  Not Given


Furosemide (Lasix)  40 mg PO DAILY [facility]


Gabapentin (Neurontin)  600 mg PO BID [facility]


   Last Admin: 03/28/18 17:03 Dose:  600 mg


Piperacillin Sod/Tazobactam (Sod 3.375 gm/ Sodium Chloride)  100 mls @ 100 mls/

hr IVPB Q8@0500,1300,2100 [facility]


   Last Admin: 03/28/18 12:12 Dose:  100 mls/hr


Vancomycin HCl 1 gm/ Sodium (Chloride)  250 mls @ 166.667 mls/hr IVPB Q12@0500,

1700 [facility]


   PRN Reason: Protocol


   Last Admin: 03/28/18 04:07 Dose:  166.667 mls/hr


Insulin Detemir (Levemir)  18 units SC HS [facility]


   Last Admin: 03/27/18 21:41 Dose:  18 units


Insulin Human Lispro (Humalog)  0 units SC ACHS [facility]


   Last Admin: 03/28/18 16:42 Dose:  Not Given


Insulin Human Lispro (Humalog)  4 units SC AC [facility]


   Last Admin: 03/28/18 17:03 Dose:  4 units


Losartan Potassium (Cozaar)  25 mg PO DAILY [facility]


   Last Admin: 03/28/18 09:42 Dose:  25 mg


Mupirocin (Bactroban Ointment)  1 applic TOP DAILY [facility]


   Last Admin: 03/28/18 08:16 Dose:  1 applic


Pantoprazole Sodium (Protonix Ec Tab)  40 mg PO DAILY [facility]


   Last Admin: 03/28/18 08:17 Dose:  40 mg


Potassium Chloride (K-Dur 20 Meq Er Tab)  20 meq PO DAILY [facility]


Sitagliptin Phosphate (Januvia)  100 mg PO DAILY@0800 [facility]


   Last Admin: 03/28/18 08:17 Dose:  100 mg


Temazepam (Restoril)  15 mg PO HS PRN


   PRN Reason: Insomnia


   Last Admin: 03/25/18 02:12 Dose:  15 mg


Tramadol HCl (Ultram)  50 mg PO Q6 PRN


   PRN Reason: Pain, moderate (4-7)


   Last Admin: 03/28/18 15:37 Dose:  50 mg











- Labs


Labs: 


 





 03/28/18 05:40 





 03/27/18 05:45 











- Constitutional


Appears: Non-toxic, Chronically Ill





- Head Exam


Head Exam: NORMOCEPHALIC





- Eye Exam


Eye Exam: absent: Scleral icterus





- ENT Exam


ENT Exam: Mucous Membranes Dry





- Neck Exam


Neck Exam: absent: Lymphadenopathy





- Respiratory Exam


Respiratory Exam: Decreased Breath Sounds





- Cardiovascular Exam


Cardiovascular Exam: REGULAR RHYTHM





- GI/Abdominal Exam


GI & Abdominal Exam: Distended





- Rectal Exam


Rectal Exam: Deferred





-  Exam


 Exam: NORMAL INSPECTION





- Extremities Exam


Extremities Exam: Pedal Edema, Tenderness.  absent: Calf Tenderness





- Back Exam


Back Exam: absent: CVA tenderness (L), CVA tenderness (R)





- Neurological Exam


Neurological Exam: Alert, Awake, Normal Gait, Oriented x3


Neuro motor strength exam: Left Upper Extremity: 3, Right Upper Extremity: 3, 

Left Lower Extremity: 3, Right Lower Extremity: 3





- Psychiatric Exam


Psychiatric exam: Depressed





- Skin


Skin Exam: Dry





Assessment and Plan





- Assessment and Plan (Free Text)


Plan: 





sepsis resolving on vanco 


consider MRI left foot - repeat


consider cont vanco for 6 weeks with follow up by podiatry

## 2018-03-28 NOTE — CP.SDSHP
Same Day Surgery [facility]





- History


Proposed Procedure: PICC placement


Pre-Op Diagnosis: Infection





- Allergies


Allergies: 


Allergies





No Known Allergies Allergy (Verified 03/21/18 15:46)


 











- Physical Exam


Vital Signs: 


 Vital Signs











  03/28/18 03/28/18 03/28/18





  11:49 11:53 13:37


 


Temperature 96.8 F L  98.9 F


 


Pulse Rate 80  82


 


Respiratory 20 20 18





Rate   


 


Blood Pressure 110/67  117/70


 


O2 Sat by Pulse 91 L  99





Oximetry   











Mental Status: Alert & Oriented x3





- Impression


Impression: Pt with foot infection requiring IV abx. Plan single lumen picc 

placement right arm. Informed consent obtained.


Pt. Evaluated Today:Candidate for Anesthesia & Procedure: No





Short Stay Discharge





- Short Stay Discharge


Admitting Diagnosis/Reason for Visit: LT FOOT INFECTED WOUND


Disposition: REHAB FACILITY/REHAB UNIT


Referrals: 


Sabine Moon MD [Primary Care Provider] -

## 2018-03-28 NOTE — CP.PCM.PN
Subjective





- Date & Time of Evaluation


Date of Evaluation: 03/28/18


Time of Evaluation: 09:00





- Subjective


Subjective: 





Podiatry Progress Note- Dr. Ascencio





67 y.o male seen and evaluated at bedside for continued treatment of left foot 

ulceration. Seen resting comfortably in bed, in NAD, and AA0x3. Patient is seen 

sitting in bed and in NAD. Patient reports that he is feeling well. Denies 

nausea, vomiting, shortness of breath, chest pains, or chills. Denies calf pain 

or tenderness. No new pedal complaints today. 





Objective





- Vital Signs/Intake and Output


Vital Signs (last 24 hours): 


 











Temp Pulse Resp BP Pulse Ox


 


 98.9 F   82   18   114/66   98 


 


 03/28/18 07:52  03/28/18 09:42  03/28/18 07:52  03/28/18 09:42  03/28/18 07:52











- Medications


Medications: 


 Current Medications





Acetaminophen (Tylenol 325mg Tab)  650 mg PO Q4 PRN


   PRN Reason: Fever >100.4 F


   Last Admin: 03/26/18 16:03 Dose:  650 mg


Collagenase (Santyl)  1 applic TOP DAILY [facility]


   Last Admin: 03/28/18 08:06 Dose:  Not Given


Gabapentin (Neurontin)  600 mg PO BID [facility]


   Last Admin: 03/28/18 08:17 Dose:  600 mg


Piperacillin Sod/Tazobactam (Sod 3.375 gm/ Sodium Chloride)  100 mls @ 100 mls/

hr IVPB Q8@0500,1300,2100 [facility]


   Last Admin: 03/28/18 04:07 Dose:  100 mls/hr


Vancomycin HCl 1 gm/ Sodium (Chloride)  250 mls @ 166.667 mls/hr IVPB Q12@0500,

1700 [facility]


   PRN Reason: Protocol


   Last Admin: 03/28/18 04:07 Dose:  166.667 mls/hr


Insulin Detemir (Levemir)  18 units SC HS [facility]


   Last Admin: 03/27/18 21:41 Dose:  18 units


Insulin Human Lispro (Humalog)  0 units SC ACHS [facility]


   Last Admin: 03/28/18 08:06 Dose:  Not Given


Insulin Human Lispro (Humalog)  4 units SC AC [facility]


   Last Admin: 03/28/18 08:17 Dose:  4 units


Losartan Potassium (Cozaar)  25 mg PO DAILY [facility]


   Last Admin: 03/28/18 09:42 Dose:  25 mg


Mupirocin (Bactroban Ointment)  1 applic TOP DAILY [facility]


   Last Admin: 03/28/18 08:16 Dose:  1 applic


Pantoprazole Sodium (Protonix Ec Tab)  40 mg PO DAILY [facility]


   Last Admin: 03/28/18 08:17 Dose:  40 mg


Sitagliptin Phosphate (Januvia)  100 mg PO DAILY@0800 [facility]


   Last Admin: 03/28/18 08:17 Dose:  100 mg


Temazepam (Restoril)  15 mg PO HS PRN


   PRN Reason: Insomnia


   Last Admin: 03/25/18 02:12 Dose:  15 mg


Tramadol HCl (Ultram)  50 mg PO Q6 PRN


   PRN Reason: Pain, moderate (4-7)


   Last Admin: 03/26/18 21:09 Dose:  50 mg











- Labs


Labs: 


 





 03/28/18 05:40 





 03/27/18 05:45 











- Constitutional


Appears: Well, Non-toxic, No Acute Distress





- Extremities Exam


Extremities Exam: absent: Calf Tenderness


Additional comments: 





VASC: LLE DP and PT pulses nonpalpable. RLE DP pulse weakly palpable 1/4, PT 

nonpalpable. CFT <3 seconds to all digits x10. Digital hair is present. Mild 

nonpitting edmea noted to left foot, most prominent on the plantar aspect of 

the medial arch, temperature to the LE is warm to warm bilaterally





NEURO: Protective and gross sensation diminished





DERM: Necrotic eschar is becoming more fibrotic noted to dorsolateral left foot 

with periwound skin erosion and detachment- no notable fluctanance appreciated 

with palpation of the eschar. Majority of the wound most likely to level of skin

, with portion of ulceration likely down to level of tendon. Mild periwound 

erythema surrounding entire eschar, no streaking appreciated, no malodor, no 

purulent drainage or drainage expressed. Less maceration noted to entire 

dorsolateral ulceratio compared to yesterday n, no drainage expressed, no 

fluctance noted





ORTHO: mild pain with palpation to the left foot








- Neurological Exam


Neurological Exam: Alert, Awake, Oriented x3





- Psychiatric Exam


Psychiatric exam: Normal Affect, Normal Mood





Assessment and Plan





- Assessment and Plan (Free Text)


Assessment: 





67M with unstageable necrotic eschar to dorsum of left foot, appearing more 

fibrotic


Plan: 





-Patient seen and evaluated 


-Afebrile today, WBC 12.1 trending down


-Discussed plan in detail with attending Dr. Ascencio


-Tib/fib xrays ordered and reviewed; negative for soft tissue emphysema


-MRI shows no OM; Dorsal cellulitis with plantar foot myositis in trace fluid 

underlying plantar fascia though chronic fasciitis is not excluded at posterior 

plantar fascia


-Wound cleansed with saline, Santly applied, dressed with gauze, ABD, kerlix


-Eschar is becoming more fibrous; less maceration noted to the wound


   -c/w santyl


   -Wound likely to level of the skin with some portion likely to level of 

tendon


-Need to r/o other sources of elevated WBC; foot appears clinically stable, no 

abscess appreciated


-Will order MRI to re-evaluation foot as source 


-If persist fever persist and Santyl does not clinically improve wound, may 

bring patient to the OR for a more aggressive wound debridement 


-Will closely monitor


-Multipodus boots placed b/l


-Patient may WBAT to the heels to the left foot in surgical shoe


-Continue IV abx per ID


-Podiatry will continue to follow while patient in house

## 2018-03-28 NOTE — PCM.SURG1
Surgeon's Initial Post Op Note





- Surgeon's Notes


Surgeon: Alejandro Kamara MD


Assistant: NONE


Type of Anesthesia: Local


Pre-Operative Diagnosis: Infection


Operative Findings: US showed a patent right basilic vein


Post-Operative Diagnosis: Infection


Operation Performed: Single lumen picc placement right basilic vein, 37 cm. Tip 

is in the SVC.


Specimen/Specimens Removed: None


Estimated Blood Loss: EBL {In ML}: 2


Blood Products Given: N/A


Drains Used: No Drains


Post-Op Condition: Fair


Date of Surgery/Procedure: 03/28/18


Time of Surgery/Procedure: 13:55

## 2018-03-29 VITALS — OXYGEN SATURATION: 97 % | TEMPERATURE: 99.3 F

## 2018-03-29 VITALS — HEART RATE: 83 BPM

## 2018-03-29 VITALS — RESPIRATION RATE: 20 BRPM

## 2018-03-29 RX ADMIN — INSULIN LISPRO SCH: 100 INJECTION, SOLUTION INTRAVENOUS; SUBCUTANEOUS at 13:18

## 2018-03-29 RX ADMIN — INSULIN LISPRO SCH UNITS: 100 INJECTION, SOLUTION INTRAVENOUS; SUBCUTANEOUS at 07:44

## 2018-03-29 RX ADMIN — WATER SCH MLS/HR: 1 INJECTION INTRAMUSCULAR; INTRAVENOUS; SUBCUTANEOUS at 13:21

## 2018-03-29 RX ADMIN — COLLAGENASE SANTYL SCH: 250 OINTMENT TOPICAL at 08:15

## 2018-03-29 RX ADMIN — WATER SCH MLS/HR: 1 INJECTION INTRAMUSCULAR; INTRAVENOUS; SUBCUTANEOUS at 05:13

## 2018-03-29 RX ADMIN — INSULIN LISPRO SCH: 100 INJECTION, SOLUTION INTRAVENOUS; SUBCUTANEOUS at 06:47

## 2018-03-29 RX ADMIN — PANTOPRAZOLE SODIUM SCH MG: 40 TABLET, DELAYED RELEASE ORAL at 08:13

## 2018-03-29 RX ADMIN — INSULIN LISPRO SCH UNITS: 100 INJECTION, SOLUTION INTRAVENOUS; SUBCUTANEOUS at 17:11

## 2018-03-29 RX ADMIN — INSULIN DETEMIR SCH UNITS: 100 INJECTION, SOLUTION SUBCUTANEOUS at 22:02

## 2018-03-29 RX ADMIN — INSULIN LISPRO SCH: 100 INJECTION, SOLUTION INTRAVENOUS; SUBCUTANEOUS at 17:11

## 2018-03-29 RX ADMIN — INSULIN LISPRO SCH: 100 INJECTION, SOLUTION INTRAVENOUS; SUBCUTANEOUS at 21:19

## 2018-03-29 RX ADMIN — WATER SCH MLS/HR: 1 INJECTION INTRAMUSCULAR; INTRAVENOUS; SUBCUTANEOUS at 21:17

## 2018-03-29 NOTE — CP.PCM.PN
Subjective





- Date & Time of Evaluation


Date of Evaluation: 03/29/18


Time of Evaluation: 10:00





- Subjective


Subjective: 


Podiatry Progress Note- Dr. Ascencio





67 y.o male seen and evaluated at bedside for continued treatment of left foot 

ulceration. Seen resting comfortably in bed, in NAD, and AA0x3. Brother is at 

bedside during visitation. Patient reports that he is feeling well. Denies 

nausea, vomiting, shortness of breath, chest pains, or chills. Denies calf pain 

or tenderness. No new pedal complaints today. 








Objective





- Vital Signs/Intake and Output


Vital Signs (last 24 hours): 


 











Temp Pulse Resp BP Pulse Ox


 


 98.9 F   82   20   111/68   97 


 


 03/29/18 07:37  03/29/18 08:13  03/29/18 07:37  03/29/18 08:14  03/29/18 07:37











- Medications


Medications: 


 Current Medications





Acetaminophen (Tylenol 325mg Tab)  650 mg PO Q4 PRN


   PRN Reason: Fever >100.4 F


   Last Admin: 03/26/18 16:03 Dose:  650 mg


Collagenase (Santyl)  1 applic TOP DAILY [facility]


   Last Admin: 03/29/18 08:15 Dose:  Not Given


Ferrous Sulfate (Feosol)  325 mg PO DAILY [facility]


Furosemide (Lasix)  40 mg PO DAILY [facility]


   Last Admin: 03/29/18 08:14 Dose:  40 mg


Gabapentin (Neurontin)  400 mg PO HS [facility]


Piperacillin Sod/Tazobactam (Sod 3.375 gm/ Sodium Chloride)  100 mls @ 100 mls/

hr IVPB Q8@0500,1300,2100 [facility]


   Last Admin: 03/29/18 05:13 Dose:  100 mls/hr


Vancomycin HCl 1 gm/ Sodium (Chloride)  250 mls @ 166.667 mls/hr IVPB Q12@0500,

1700 [facility]


   PRN Reason: Protocol


   Last Admin: 03/28/18 04:07 Dose:  166.667 mls/hr


Insulin Detemir (Levemir)  18 units SC HS [facility]


   Last Admin: 03/28/18 21:55 Dose:  18 units


Insulin Human Lispro (Humalog)  0 units SC ACHS [facility]


   Last Admin: 03/29/18 06:47 Dose:  Not Given


Insulin Human Lispro (Humalog)  4 units SC AC [facility]


   Last Admin: 03/29/18 07:44 Dose:  4 units


Losartan Potassium (Cozaar)  25 mg PO DAILY [facility]


   Last Admin: 03/29/18 08:13 Dose:  25 mg


Mupirocin (Bactroban Ointment)  1 applic TOP DAILY [facility]


   Last Admin: 03/29/18 08:20 Dose:  1 applic


Pantoprazole Sodium (Protonix Ec Tab)  40 mg PO DAILY [facility]


   Last Admin: 03/29/18 08:13 Dose:  40 mg


Potassium Chloride (K-Dur 20 Meq Er Tab)  20 meq PO DAILY [facility]


   Last Admin: 03/29/18 08:14 Dose:  20 meq


Sitagliptin Phosphate (Januvia)  100 mg PO DAILY@0800 [facility]


   Last Admin: 03/29/18 08:13 Dose:  100 mg


Temazepam (Restoril)  15 mg PO HS PRN


   PRN Reason: Insomnia


   Last Admin: 03/25/18 02:12 Dose:  15 mg


Tramadol HCl (Ultram)  50 mg PO Q6 PRN


   PRN Reason: Pain, moderate (4-7)


   Last Admin: 03/28/18 15:37 Dose:  50 mg











- Labs


Labs: 


 





 03/28/18 05:40 





 03/27/18 05:45 











- Constitutional


Appears: Well, Non-toxic, No Acute Distress





- Extremities Exam


Extremities Exam: absent: Calf Tenderness


Additional comments: 








VASC: LLE DP and PT pulses nonpalpable. RLE DP pulse weakly palpable 1/4, PT 

nonpalpable. CFT <3 seconds to all digits x10. Digital hair is present. Mild 

nonpitting edmea noted to left foot, most prominent on the plantar aspect of 

the medial arch, temperature to the LE is warm to warm bilaterally





NEURO: Protective and gross sensation diminished





DERM: Necrotic eschar located on the entire lateral aspect of dorsum foot is 

becoming more fibrotic with periwound skin erosion and detachment- no notable 

fluctanance appreciated with palpation of the eschar. Majority of the wound 

most likely to level of skin, with portion of ulceration likely down to level 

of tendon. Mild periwound erythema surrounding entire eschar, no streaking 

appreciated, no malodor, no purulent drainage or drainage expressed. Less 

maceration noted to entire dorsolateral ulceratio compared to yesterday n, no 

drainage expressed, no fluctance noted





Edema to the plantar foot has improved today





ORTHO: mild pain with palpation to the left foot





- Neurological Exam


Neurological Exam: Alert, Awake, Oriented x3





- Psychiatric Exam


Psychiatric exam: Normal Affect, Normal Mood





Assessment and Plan





- Assessment and Plan (Free Text)


Assessment: 








67M with unstageable necrotic eschar to dorsum of left foot, appearing more 

fibrotic


Plan: 





-Patient seen and evaluated 


-Afebrile today, WBC 12.1 on 3/28/18


-Discussed plan in detail with attending Dr. Ascencio


-Tib/fib xrays ordered and reviewed; negative for soft tissue emphysema


-MRI shows no OM; Dorsal cellulitis with plantar foot myositis in trace fluid 

underlying plantar fascia though chronic fasciitis is not excluded at posterior 

plantar fascia


-Wound cleansed with saline, Santly applied, dressed with gauze, ABD, kerlix


-Eschar is becoming more fibrous; less maceration noted to the wound


   -c/w santyl


   -Wound likely to level of the skin with some portion likely to level of 

tendon


-Need to r/o other sources of elevated WBC; foot appears clinically stable, no 

abscess appreciated


-Will order MRI to re-evaluation foot as source


-If persist fever persist and Santyl does not clinically improve wound, may 

bring patient to the OR for a more aggressive wound debridement 


-Will closely monitor


-Multipodus boots placed b/l


-Patient may WBAT to the heels to the left foot in surgical shoe


-Wound culture pending- preliminary yeast species


-Continue IV abx per ID, recommendations appreciated


-Podiatry will continue to follow while patient in house

## 2018-03-29 NOTE — PN
DATE:



ENDO FOLLOWUP NOTE



LOCATION:  [facility].



SUBJECTIVE:  This is a 67-year-old male with recent uncontrolled type 2

insulin-requiring diabetes now being followed closely for metabolic

management.  His oral intake remains quite variable at this time, but is

slowly improving as noted today.  His glycemic profile has also been

fluctuating and the latest glucose levels overnight showed glucose values

ranging from  and 171 mg/dL.  His proBNP is 3320.  The latest

chemistry showed BUN of 12, sodium of 135, potassium of 4.4, chloride of

96, CO2 of 29, glucose of 196, and creatinine of 0.9.  So at this time, we

will continue the same basal and bolus insulin regimen as given with

Humalog given as 4 units subcu t.i.d. before meals as ordered.  We will

also continue the basal insulin given as Levemir at _____ units subcu at

bedtime daily as given.  We will titrate incrementally as indicated to

optimize metabolic control.  We will continue also the low-dose correction

scale using Humalog insulin to obviate hypoglycemia and detailed orders

have been given.  We will follow and advice accordingly.





__________________________________________

Bernie Collins MD





DD:  03/29/2018 15:15:17

DT:  03/29/2018 15:17:24

Job # [id number]

## 2018-03-30 ENCOUNTER — HOSPITAL ENCOUNTER (INPATIENT)
Dept: HOSPITAL 14 - H.ER | Age: 68
LOS: 5 days | Discharge: NURSING, REHAB OR LONG-TERM CARE | DRG: 246 | End: 2018-04-04
Attending: FAMILY MEDICINE | Admitting: FAMILY MEDICINE
Payer: MEDICARE

## 2018-03-30 VITALS — BODY MASS INDEX: 26.4 KG/M2

## 2018-03-30 VITALS — SYSTOLIC BLOOD PRESSURE: 156 MMHG | DIASTOLIC BLOOD PRESSURE: 87 MMHG

## 2018-03-30 DIAGNOSIS — I21.4: Primary | ICD-10-CM

## 2018-03-30 DIAGNOSIS — E11.621: ICD-10-CM

## 2018-03-30 DIAGNOSIS — Z95.5: ICD-10-CM

## 2018-03-30 DIAGNOSIS — M60.9: ICD-10-CM

## 2018-03-30 DIAGNOSIS — Z79.4: ICD-10-CM

## 2018-03-30 DIAGNOSIS — L97.529: ICD-10-CM

## 2018-03-30 DIAGNOSIS — I27.20: ICD-10-CM

## 2018-03-30 DIAGNOSIS — L03.116: ICD-10-CM

## 2018-03-30 DIAGNOSIS — I50.23: ICD-10-CM

## 2018-03-30 DIAGNOSIS — I31.3: ICD-10-CM

## 2018-03-30 DIAGNOSIS — F31.9: ICD-10-CM

## 2018-03-30 DIAGNOSIS — I11.0: ICD-10-CM

## 2018-03-30 DIAGNOSIS — Z79.82: ICD-10-CM

## 2018-03-30 LAB
APTT BLD: 35.5 SECONDS (ref 25.6–37.1)
BNP SERPL-MCNC: 3600 PG/ML (ref 0–900)
INR PPP: 1.2 (ref 0.9–1.2)
PROTHROMBIN TIME: 13.5 SECONDS (ref 9.8–13.1)
TROPONIN I SERPL-MCNC: 1.04 NG/ML (ref 0–0.12)

## 2018-03-30 RX ADMIN — INSULIN LISPRO SCH U: 100 INJECTION, SOLUTION INTRAVENOUS; SUBCUTANEOUS at 06:20

## 2018-03-30 RX ADMIN — INSULIN DETEMIR SCH UNITS: 100 INJECTION, SOLUTION SUBCUTANEOUS at 21:43

## 2018-03-30 RX ADMIN — INSULIN LISPRO SCH UNITS: 100 INJECTION, SOLUTION INTRAVENOUS; SUBCUTANEOUS at 13:43

## 2018-03-30 RX ADMIN — WATER SCH MLS/HR: 1 INJECTION INTRAMUSCULAR; INTRAVENOUS; SUBCUTANEOUS at 05:50

## 2018-03-30 RX ADMIN — ENOXAPARIN SODIUM SCH MG: 80 INJECTION SUBCUTANEOUS at 21:42

## 2018-03-30 RX ADMIN — ENOXAPARIN SODIUM SCH: 80 INJECTION SUBCUTANEOUS at 21:43

## 2018-03-30 NOTE — CARD
--------------- APPROVED REPORT --------------





EKG Measurement

Heart [id number]

ME 126P52

[id number]

[id number]

[id number]



<Conclusion>

Normal sinus rhythm

Low voltage QRS

Prolonged QT

Abnormal ECG

## 2018-03-30 NOTE — MRI
MRI left forefoot 



History: Infection.  Abscess. 



Comparison: None available. 



Technique: Multi-echo multiplanar sequences were performed through 

the left forefoot without the use of intravenous contrast. 



Findings: 



Extensive subcutaneous edema along the dorsum of the forefoot 

predominantly overlying the 5th metatarsal bone with probable 

associated fluid collection and or possible abscess collection 

measuring 8.2 x 5.3 x 1.4 centimeters. This appears contiguous with 

fluid surrounding the extensor digitorum longus tendon sheath with 

associated fluid in the extensor digitorum longus tendon sheath. This 

may represent a contiguous infected tenosynovitis of the extensor 

digitorum longus tendon sheath. In addition, there is some patchy 

signal abnormality seen at the bases of the 4th and 5th metatarsal 

bones as well as at the cuboid bone adjacent to the collection with 

increased STIR signal and minimal patchy decreased T1 signal.  These 

findings are nonspecific and may be the swelling of degenerative 

changes; however, the sequelae of developing acute infectious and or 

inflammatory changes at this level cannot entirely be excluded. 

Clinical correlation. 



Additional milder scattered areas of non specific reactive edema seen 

within middle and lateral cuneiform bones as well as at the 5th 

proximal phalanx. 



Minimal subchondral cyst formation noted at the head of the 1st 

proximal phalanx. 



Mild hallux valgus deformity with degenerative changes and mild 

subchondral cyst formation at the 1st metatarsal head. 



Mild fraying and increased signal seen at the visualized Lisfranc 

ligament suggestive for moderate grade sprain and or partial tearing. 

Clinical correlation. 



Degenerative changes noted at the dorsal aspect of the talonavicular 

joint space. 



Diffuse edema throughout the entire musculature of the foot as well 

as within the intrinsic hindfoot extensor muscles. 



Trace edema within the sinus tarsi with mild effacement of the sinus 

tarsi which may represent a sinus tarsi syndrome. 



Extensive thickening of the medial greater than lateral bands of the 

plantar fascia with edema both superficial to the lateral bands. 



Degenerative chondral loss within the midfoot. 



Impression: 



1. Extensive subcutaneous edema along the dorsum of the forefoot 

predominantly overlying the 5th metatarsal bone with probable 

associated fluid collection and or possible abscess collection 

measuring 8.2 x 5.3 x 1.4 centimeters. This appears contiguous with 

fluid surrounding the extensor digitorum longus tendon sheath with 

associated fluid in the extensor digitorum longus tendon sheath. This 

may represent a contiguous infected tenosynovitis of the extensor 

digitorum longus tendon sheath. In addition, there is some patchy 

signal abnormality seen at the bases of the 4th and 5th metatarsal 

bones as well as at the cuboid bone adjacent to the collection with 

increased STIR signal and minimal patchy decreased T1 signal.  These 

findings are nonspecific and may be the swelling of degenerative 

changes; however, the sequelae of developing acute infectious and or 

inflammatory changes at this level cannot entirely be excluded. 

Clinical correlation. 



2. Additional milder scattered areas of non specific reactive edema 

seen within middle and lateral cuneiform bones as well as at the 5th 

proximal phalanx. 



3. Minimal subchondral cyst formation noted at the head of the 1st 

proximal phalanx. 



4. Mild hallux valgus deformity with degenerative changes and mild 

subchondral cyst formation at the 1st metatarsal head. 



5. Mild fraying and increased signal seen at the visualized Lisfranc 

ligament suggestive for moderate grade sprain and or partial tearing. 

Clinical correlation. 



6. Degenerative changes noted at the dorsal aspect of the 

talonavicular joint space. 



7. Diffuse edema throughout the entire musculature of the foot as 

well as within the intrinsic hindfoot extensor muscles. 



8. Trace edema within the sinus tarsi with mild effacement of the 

sinus tarsi which may represent a sinus tarsi syndrome. 



9. Extensive thickening of the medial greater than lateral bands of 

the plantar fascia with edema both superficial to the lateral bands. 



10. Degenerative chondral loss within the midfoot. 



These findings were preliminarily reported at 8:27 p.m. on 03/29/2018 

by Dr. Alejandro Ahn from [facility].

## 2018-03-30 NOTE — CP.PCM.CON
History of Present Illness





- History of Present Illness


History of Present Illness: 





67 year old male with medical history of diabetes and chronic leg ulcers, 

presents to the emergency department from TCU for an evaluation of shortness of 

breath with decreasing O2 saturation and fluid retention during physical 

therapy treatments. Patient was recently diagnosed with CHF and has not been 

able to maintain proper oxygen levels. He was last given Lasix 40mg PO at 0030 

earlier this morning. 








68 y/o M with PMhx of IDDM, s/p pancreatic resection was  brought to ED with 

AMS after being found by brother at home laying in the floor naked. Patient was 

admitted to ICU with severe DKA, AMS, ERIC< Sepsis and infected  L/foot ulcer . 

MRI showed severe cellulitis/ myositis but no definite OM  Is being treatewd 

for severe SSTI with myositis left foot for 3 weeks of IV rx 





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 











Review of Systems





- Constitutional


Constitutional: As Per HPI





- EENT


Eyes: absent: As Per HPI, Blind Spots, Blurred Vision, Change in Vision, 

Decreased Night Vision, Diplopia, Discharge, Dry Eye, Exophthalmos, Floaters, 

Irritation, Itchy Eyes, Loss of Peripheral Vision, Pain, Photophobia, Requires 

Corrective Lenses, Sees Flashes, Spots in Vision, Tunnel Vision, Other Visual 

Disturbances, Loss of Vision, Other


Ears: absent: As Per HPI, Decreased Hearing, Ear Discharge, Ear Pain, Tinnitus, 

Abnormal Hearing, Disequilibrium, Dizziness, Other


Nose/Mouth/Throat: absent: As Per HPI, Epistaxis, Nasal Congestion, Nasal 

Discharge, Nasal Obstruction, Nasal Trauma, Nose Pain, Post Nasal Drip, Sinus 

Pain, Sinus Pressure, Bleeding Gums, Change in Voice, Dental Pain, Dry Mouth, 

Dysphagia, Halitosis, Hoarsness, Lip Swelling, Mouth Lesions, Mouth Pain, 

Odynophagia, Sore Throat, Throat Swelling, Tongue Swelling, Facial Pain, Neck 

Pain, Neck Mass, Other





- Cardiovascular


Cardiovascular: As Per HPI





- Respiratory


Respiratory: absent: As Per HPI, Cough, Dyspnea, Hemoptysis, Dyspnea on Exertion

, Wheezing, Snoring, Stridor, Pain on Inspiration, Chest Congestion, Excessive 

Mucous Production, Change in Mucous Color, Pain with Coughing, Other





- Gastrointestinal


Gastrointestinal: As Per HPI





- Genitourinary


Genitourinary: absent: As Per HPI, Change in Urinary Stream, Difficulty 

Urinating, Dysuria, Flank Pain, Hematuria, Pyuria, Nocturia, Urinary 

Incontinence, Urinary Frequency, Urinary Hesitance, Urinary Urgency, Voiding 

Freq/Small Amts, Freq UTI, Hx Renal/Bladder Calculi, Hx /Renal Surgery, 

Bladder Distension, Other





- Musculoskeletal


Musculoskeletal: As Per HPI





- Integumentary


Integumentary: As Per HPI, Dry Skin, Skin Pain, Wounds





- Neurological


Neurological: As Per HPI





- Psychiatric


Psychiatric: absent: As Per HPI, Abnormal Sleep Pattern, Anhedonia, Anxiety, 

Auditory Hallucinations, Behavioral Changes, Change in Appetite, Change in 

Libido, Confusion, Depression, Difficulty Concentrating, Hallucinations, 

Homicidal Ideation, Hopelessness, Irritability, Memory Loss, Mood Swings, Panic 

Attacks, Paranoia, Suicidal Ideation, Visual Hallucinations, Tactile 

Hallucinations, Other





- Endocrine


Endocrine: absent: As Per HPI, Change in Body Appearance, Change in Libido, 

Cold Intolorance, Deepening of Voice, Excessive Sweating, Fatigue, Flushing, 

Heat Intolorance, Increase in Ring/Shoe/Hat Size, Palpitations, Polydipsia, 

Polyphagia, Polyuria, Other





- Hematologic/Lymphatic


Hematologic: absent: As Per HPI, Easy Bleeding, Easy Bruising, Lymphadenopathy, 

Other





Past Patient History





- Past Social History


Alcohol: Occasional


Drugs: Denies





- CARDIAC


Hx Cardiac Disorders: No





- PULMONARY


Hx Respiratory Disorders: No





- NEUROLOGICAL


Hx Neurological Disorder: No





- RENAL


Hx Chronic Kidney Disease: No





- ENDOCRINE/METABOLIC


Hx Endocrine Disorders: Yes


Hx Diabetes Mellitus Type 2: Yes





- HEMATOLOGICAL/ONCOLOGICAL


Hx Blood Disorders: No





- MUSCULOSKELETAL/RHEUMATOLOGICAL


Hx Musculoskeletal Disorders: No





- GASTROINTESTINAL


Hx Pancreatitis: Yes





- PSYCHIATRIC


Hx Psychophysiologic Disorder: No


Hx Substance Use: No





- SURGICAL HISTORY


Hx Surgeries: No


Other/Comment: removal of pancreas





- ANESTHESIA


Hx Anesthesia: Yes


Hx Anesthesia Reactions: No


Hx Malignant Hyperthermia: No





Meds


Allergies/Adverse Reactions: 


 Allergies











Allergy/AdvReac Type Severity Reaction Status Date / Time


 


No Known Allergies Allergy   Verified 03/30/18 06:46














- Medications


Medications: 


 Current Medications





Acetaminophen (Tylenol 325mg Tab)  650 mg PO Q4 PRN


   PRN Reason: Fever >100.4 F


Collagenase (Santyl)  1 applic TOP DAILY [facility]


Ferrous Sulfate (Feosol)  325 mg PO DAILY SUZY


Fluconazole (Diflucan)  200 mg PO DAILY SUZY


   PRN Reason: Protocol


Furosemide (Lasix)  40 mg PO DAILY [facility]


Gabapentin (Neurontin)  400 mg PO HS [facility]


Vancomycin HCl 1 gm/ Sodium (Chloride)  250 mls @ 166.667 mls/hr IVPB DAILY SUZY


   PRN Reason: Protocol


Insulin Detemir (Levemir)  18 units SC HS [facility]


Insulin Human Lispro (Humalog)  4 units SC AC [facility]


   Last Admin: 03/30/18 13:43 Dose:  4 units


Losartan Potassium (Cozaar)  25 mg PO DAILY [facility]


Naproxen (Naproxen)  500 mg PO Q12 [facility]


Pantoprazole Sodium (Protonix Ec Tab)  40 mg PO DAILY [facility]


Potassium Chloride (K-Dur 20 Meq Er Tab)  20 meq PO DAILY [facility]


Sitagliptin Phosphate (Januvia)  100 mg PO DAILY@0800 SUZY


Temazepam (Restoril)  15 mg PO HS PRN


   PRN Reason: Insomnia











Physical Exam





- Constitutional


Appears: Cachectic, Chronically Ill





- Head Exam


Head Exam: ATRAUMATIC, NORMOCEPHALIC





- Eye Exam


Eye Exam: PERRL.  absent: Scleral icterus





- ENT Exam


ENT Exam: Mucous Membranes Dry





- Neck Exam


Neck exam: Negative for: Lymphadenopathy





- Respiratory Exam


Respiratory Exam: Decreased Breath Sounds, Prolonged Expiratory Phase, Rales, 

Rhonchi





- Cardiovascular Exam


Cardiovascular Exam: REGULAR RHYTHM





- GI/Abdominal Exam


GI & Abdominal Exam: Diminished Bowel Sounds, Soft.  absent: Tenderness





- Rectal Exam


Rectal Exam: Deferred





-  Exam


 Exam: NORMAL INSPECTION





- Extremities Exam


Extremities exam: Positive for: pedal edema, pedal pulses present.  Negative for

: calf tenderness, tenderness


Additional comments: 





eschar left foot / cellulitis





- Back Exam


Back exam: absent: CVA tenderness (L), CVA tenderness (R)





- Neurological Exam


Neurological exam: Alert, CN II-XII Intact, Oriented x3, Reflexes Normal





- Psychiatric Exam


Psychiatric exam: Normal Mood





- Skin


Skin Exam: Dry





Results





- Vital Signs


Recent Vital Signs: 


 Last Vital Signs











Temp  98.7 F   03/30/18 06:47


 


Pulse  84   03/30/18 11:45


 


Resp  24   03/30/18 11:45


 


BP  139/67   03/30/18 11:45


 


Pulse Ox  99   03/30/18 11:45














- Labs


Labs: 


 Laboratory Results - last 24 hr











  03/30/18 03/30/18 03/30/18





  08:08 08:08 11:04


 


PT   13.5 H 


 


INR   1.2 


 


APTT   35.5 


 


POC Glucose (mg/dL)    288 H


 


Lactate Dehydrogenase  585  


 


Total Creatine Kinase  86  


 


Troponin I  1.0400 H*  


 


NT-Pro-B Natriuret Pep  3600 H  














Assessment & Plan


(1) CHF (congestive heart failure)


Status: Acute   





(2) Diabetes mellitus type 2 in nonobese


Status: Acute   





(3) Sepsis


Status: Acute   Priority: High   





- Assessment and Plan (Free Text)


Assessment: 





await mri left foot 


r/o abscess


consider FREDDY / cardio eval


cont iv vanco and diflucan PO

## 2018-03-30 NOTE — RAD
HISTORY:

increasing shortness of breath  



COMPARISON:

Chest radiograph performed approximately 7.5 hours prior. 



FINDINGS:



LUNGS:

Pulmonary vascular congestion. Bibasilar atelectasis.



PLEURA:

Small left and trace right pleural effusions, unchanged. No 

pneumothorax apparent.



CARDIOVASCULAR:

Atherosclerotic aortic calcifications.  Cardiomediastinal silhouette 

stably enlarged.



OSSEOUS STRUCTURES:

Unchanged.



VISUALIZED UPPER ABDOMEN:

Normal.



OTHER FINDINGS:

Right upper extremity PICC, unchanged.



IMPRESSION:

Grossly stable pulmonary vascular congestion and small left and trace 

right pleural effusions.

## 2018-03-30 NOTE — ED PDOC
HPI: SOB/CHF/COPD


Time Seen by Provider: 03/30/18 07:02


Chief Complaint (Nursing): Shortness Of Breath


Chief Complaint (Provider): Shortness Of Breath


History Per: Patient


History/Exam Limitations: no limitations


Onset/Duration Of Symptoms: Sudden Onset


Current Symptoms Are (Timing): Still Present


Additional Complaint(s): 





67 year old male with medical history of diabetes and chronic leg ulcers, 

presents to the emergency department from TCU for an evaluation of shortness of 

breath with decreasing O2 saturation and fluid retention during physical 

therapy treatments. Patient was recently diagnosed with CHF and has not been 

able to maintain proper oxygen levels. He was last given Lasix 40mg PO at 0030 

earlier this morning. 





PMD: Gunner Valdez MD





Past Medical History


Reviewed: Historical Data, Nursing Documentation, Vital Signs


Vital Signs: 


 Last Vital Signs











Temp  98.7 F   03/30/18 06:47


 


Pulse  90   03/30/18 06:47


 


Resp  26 H  03/30/18 07:10


 


BP  147/82   03/30/18 06:47


 


Pulse Ox  96   03/30/18 09:06














- Medical History


PMH: Diabetes, Pancreatitis


   Denies: Chronic Kidney Disease





- Family History


Family History: States: Unknown Family Hx





- Social History


Current smoker - smoking cessation education provided: No


Alcohol: Occasional


Drugs: Denies





- Immunization History


Hx Tetanus Toxoid Vaccination: No


Hx Influenza Vaccination: No


Hx Pneumococcal Vaccination: No





- Home Medications


Home Medications: 


 Ambulatory Orders











 Medication  Instructions  Recorded


 


Gabapentin [Neurontin] 600 mg PO BID  tab 03/21/18


 


Insulin Detemir [Levemir] 40 units SC HS  vial 03/21/18


 


Insulin Lispro [Humalog (Insulin 14 unit SQ AC 03/21/18





Lispro)]  


 


Mupirocin 2% Ointment [Bactroban 1 applic TOP BID  tube 03/21/18





Ointment]  


 


Pantoprazole [Protonix] 40 mg PO DAILY #30 ect 03/21/18


 


Pioglitazone [Actos] 30 mg PO DAILY  tab 03/21/18


 


Piperacill/Tazo 3.375gm in Dex 3.375 gm IVPB Q8 #15 bag 03/21/18





[Zosyn 3.375 Gm IV]  


 


traMADol [Ultram] 50 mg PO Q6 PRN #20 tab 03/21/18














- Allergies


Allergies/Adverse Reactions: 


 Allergies











Allergy/AdvReac Type Severity Reaction Status Date / Time


 


No Known Allergies Allergy   Verified 03/30/18 06:46














Review of Systems


ROS Statement: Except As Marked, All Systems Reviewed And Found Negative


Constitutional: Negative for: Fever, Chills


Cardiovascular: Negative for: Chest Pain


Respiratory: Positive for: Shortness of Breath


Gastrointestinal: Negative for: Nausea, Vomiting, Abdominal Pain





Physical Exam





- Reviewed


Nursing Documentation Reviewed: Yes


Vital Signs Reviewed: Yes





- Physical Exam


Appears: Positive for: Non-toxic, No Acute Distress


Head Exam: Positive for: ATRAUMATIC, NORMAL INSPECTION, NORMOCEPHALIC


Skin: Positive for: Normal Color


Eye Exam: Positive for: Normal appearance


ENT: Positive for: Normal ENT Inspection


Neck: Positive for: Normal (with no JVD), Painless ROM, Supple


Cardiovascular/Chest: Positive for: Regular Rate, Rhythm, Chest Non Tender.  

Negative for: Murmur


Respiratory: Positive for: Decreased Breath Sounds (MCFP up B/L bases).  

Negative for: Normal Breath Sounds, Wheezing, Respiratory Distress


Gastrointestinal/Abdominal: Positive for: Normal Exam, Soft.  Negative for: 

Tenderness, Mass


Extremity: Positive for: Normal ROM (upper/lower).  Negative for: Pedal Edema (

bilateral), Calf Tenderness (bilateral)


Neurologic/Psych: Positive for: Alert, Oriented.  Negative for: Motor/Sensory 

Deficits





- ECG


O2 Sat by Pulse Oximetry: 96 (RA)


Pulse Ox Interpretation: Normal





Medical Decision Making


Medical Decision Making: 





Initial Impression: New onset of CHF





Initial Plan:


* EKG


* BNP


* CPK


* LDH


* Troponin I


* PTT 


* PT


* CXR


________________________________________________________________________________

_____________





Time: 0735


--Patient will be admitted for management of CHF to telemetry unit.





--------------------------------------------------------------------------------

--------------------------


Scribe Attestation:


Documented by Bernie Cardoza, acting as a scribe for Savanna Irvin MD.





Provider Scribe Attestation:


All medical record entries made by the Scribe were at my direction and 

personally dictated by me. I have reviewed the chart and agree that the record 

accurately reflects my personal performance of the history, physical exam, 

medical decision making, and the department course for this patient. I have 

also personally directed, reviewed, and agree with the discharge instructions 

and disposition.





9.10a - case d/w Dr. Valdez. Aware that troponin is elevated. EKG is normal. 

patient is without chest pain or shortness of breath in the ER. Per Dr. Valdez, 

okay to admit to telemetry.





Disposition





- Clinical Impression


Clinical Impression: 


 CHF (congestive heart failure)








- Patient ED Disposition


Is Patient to be Admitted: No


Doctor Will See Patient In The: Office





- Disposition


Disposition: Transfer of Care


Disposition Time: 09:27


Condition: STABLE


Instructions:  Heart Failure, Adult


Forms:  [employer] (English)





- Pt Status Changed To:


Hospital Disposition Of: Inpatient





- Admit Certification


Admit to Inpatient:: After my assessment, the patient will require 

hospitalization for at least two midnights.  This is because of the severity of 

symptoms shown, intensity of services needed, and/or the medical risk in this 

patient being treated as an outpatient.





- POA


Present On Arrival: None

## 2018-03-30 NOTE — CP.PCM.CON
History of Present Illness





- History of Present Illness


History of Present Illness: 





Podiatry Consult Note- Dr. Ascencio





67 y.o male with PMH of DM and recently diagnosed CHF seen and evaluated at 

bedside for continued treatment of left foot ulceration. Patient was in Tele 

receiving IV abx for left foot ulceration when patient had shortness of breath 

and chest pain, went to the ED, and no admitted to Tele. Patient is known to 

podiatry service and Dr. Ascencio. Podiatry has continued to treat patient's 

chronic left foot wound with debridment of ulceration with Santyl. MRI on 3/17/

19 shows no OM; Dorsal cellulitis with plantar foot myositis in trace fluid 

underlying plantar fascia though chronic fasciitis is not excluded at posterior 

plantar fascia. Repeat MRI ordered and pending results from TCU. Patient 

reports that he is feeling well now. Denies of chest pain or shortness of 

breath during visitation. Denies nausea, vomiting, or chills. Denies calf pain 

or tenderness. No new pedal complaints today. Reports that his wound looks 

better and less pain. Rates the pain 5/10 today. Dressing c/d/i. No 

strikethrough. Multipodus boots on during visitation. 





Past Patient History





- Past Social History


Alcohol: Occasional


Drugs: Denies





- CARDIAC


Hx Cardiac Disorders: No





- PULMONARY


Hx Respiratory Disorders: No





- NEUROLOGICAL


Hx Neurological Disorder: No





- RENAL


Hx Chronic Kidney Disease: No





- ENDOCRINE/METABOLIC


Hx Endocrine Disorders: Yes





- HEMATOLOGICAL/ONCOLOGICAL


Hx Blood Disorders: No





- MUSCULOSKELETAL/RHEUMATOLOGICAL


Hx Musculoskeletal Disorders: No





- GASTROINTESTINAL


Hx Pancreatitis: Yes





- PSYCHIATRIC


Hx Psychophysiologic Disorder: No





- SURGICAL HISTORY


Hx Surgeries: No


Other/Comment: removal of pancreas





- ANESTHESIA


Hx Anesthesia: Yes


Hx Anesthesia Reactions: No


Hx Malignant Hyperthermia: No





Meds


Allergies/Adverse Reactions: 


 Allergies











Allergy/AdvReac Type Severity Reaction Status Date / Time


 


No Known Allergies Allergy   Verified 03/30/18 06:46














- Medications


Medications: 


 Current Medications





Acetaminophen (Tylenol 325mg Tab)  650 mg PO Q4 PRN


   PRN Reason: Fever >100.4 F


Collagenase (Santyl)  1 applic TOP DAILY [facility]


Dextrose (Dextrose 50% Inj)  0 ml IV STAT PRN; Protocol


   PRN Reason: Hypoglycemia Protocol


Dextrose (Glutose 15)  0 gm PO ONCE PRN; Protocol


   PRN Reason: Hypoglycemia Protocol


Enoxaparin Sodium (Lovenox)  80 mg SC Q12 [facility]


   PRN Reason: Protocol


   Last Admin: 03/30/18 21:43 Dose:  Not Given


Ferrous Sulfate (Feosol)  325 mg PO DAILY [facility]


Fluconazole (Diflucan)  200 mg PO DAILY [facility]


   PRN Reason: Protocol


   Last Admin: 03/30/18 14:27 Dose:  200 mg


Furosemide (Lasix)  40 mg PO DAILY [facility]


Gabapentin (Neurontin)  400 mg PO HS [facility]


   Last Admin: 03/30/18 21:42 Dose:  400 mg


Glucagon (Glucagen Diagnostic Kit)  0 mg IM STAT PRN; Protocol


   PRN Reason: Hypoglycemia Protocol


Vancomycin HCl 1 gm/ Sodium (Chloride)  250 mls @ 166.667 mls/hr IVPB DAILY [facility]


   PRN Reason: Protocol


Insulin Detemir (Levemir)  18 units SC HS [facility]


   Last Admin: 03/30/18 21:43 Dose:  18 units


Insulin Human Lispro (Humalog)  4 units SC AC [facility]


   Last Admin: 03/30/18 13:43 Dose:  4 units


Losartan Potassium (Cozaar)  25 mg PO DAILY [facility]


Naproxen (Naproxen)  500 mg PO Q12 [facility]


   Last Admin: 03/30/18 22:01 Dose:  500 mg


Pantoprazole Sodium (Protonix Ec Tab)  40 mg PO DAILY [facility]


Potassium Chloride (K-Dur 20 Meq Er Tab)  20 meq PO DAILY [facility]


Sitagliptin Phosphate (Januvia)  100 mg PO DAILY@0800 [facility]


Temazepam (Restoril)  15 mg PO HS PRN


   PRN Reason: Insomnia











Physical Exam





- Constitutional


Appears: Well, Non-toxic, No Acute Distress





- Extremities Exam


Extremities exam: Negative for: calf tenderness


Additional comments: 





VASC: LLE DP and PT pulses nonpalpable. RLE DP pulse weakly palpable 1/4, PT 

nonpalpable. CFT <3 seconds to all digits x10. Digital hair is present. Mild 

nonpitting edmea noted to left foot, most prominent on the plantar aspect of 

the medial arch, temperature to the LE is warm to warm bilaterally





NEURO: Protective and gross sensation diminished





DERM: Necrotic eschar located on the entire lateral aspect of dorsum foot is 

now becoming more fibrotic (70%) with periwound skin erosion and detachment 

noted. Mild fluctanance appreciated with palpation lateral of the ulceration. 

Majority of the wound most likely to level of skin, with portion of ulceration 

likely down to level of tendon. Mild periwound erythema surrounding entire 

eschar, no streaking appreciated, no malodor, no purulent drainage or drainage 

expressed. Less maceration noted to entire dorsolateral ulceration compared to 

yesterday, no drainage expressed, no fluctance noted





Edema to the plantar foot has improved today, less swelling noted to the foot





ORTHO: mild pain with palpation to the left foot





Results





- Vital Signs


Recent Vital Signs: 


 Last Vital Signs











Temp  97.3 F L  03/30/18 19:31


 


Pulse  89   03/30/18 19:31


 


Resp  20   03/30/18 19:31


 


BP  148/80   03/30/18 19:31


 


Pulse Ox  96   03/30/18 19:31














- Labs


Labs: 


 Laboratory Results - last 24 hr











  03/30/18 03/30/18 03/30/18





  08:08 08:08 11:04


 


PT   13.5 H 


 


INR   1.2 


 


APTT   35.5 


 


POC Glucose (mg/dL)    288 H


 


Lactate Dehydrogenase  585  


 


Total Creatine Kinase  86  


 


Troponin I  1.0400 H*  


 


NT-Pro-B Natriuret Pep  3600 H  














  03/30/18





  17:41


 


PT 


 


INR 


 


APTT 


 


POC Glucose (mg/dL) 


 


Lactate Dehydrogenase 


 


Total Creatine Kinase 


 


Troponin I  0.9160 H*


 


NT-Pro-B Natriuret Pep 














Assessment & Plan





- Assessment and Plan (Free Text)


Assessment: 





67M with unstageable necrotic eschar to dorsum of left foot, appearing more 

fibrotic


Plan: 


-Patient seen and evaluated 


-Afebrile today, WBC 13.8 on 3/30/18


-Discussed plan in detail with attending Dr. Ascencio


-Tib/fib xrays ordered and reviewed; negative for soft tissue emphysema


-MRI on 3/17/18 shows no OM; Dorsal cellulitis with plantar foot myositis in 

trace fluid underlying plantar fascia though chronic fasciitis is not excluded 

at posterior plantar fascia





-MRI on 3/30/18 Impression: 1. Extensive subcutaneous edema along the dorsum of 

the forefoot predominantly overlying the 5th metatarsal bone with probable 

associated fluid collection and or possible abscess collection measuring 8.2 x 

5.3 x 1.4 centimeters. This appears contiguous with fluid surrounding the 

extensor digitorum longus tendon sheath with associated fluid in the extensor 

digitorum longus tendon sheath. This may represent a contiguous infected 

tenosynovitis of the extensor digitorum longus tendon sheath. In addition, 

there is some patchy signal abnormality seen at the bases of the 4th and 5th 

metatarsal bones as well as at the cuboid bone adjacent to the collection with 

increased STIR signal and minimal patchy decreased T1 signal.  These findings 

are nonspecific and may be the swelling of degenerative changes; however, the 

sequelae of developing acute infectious and or inflammatory changes at this 

level cannot entirely be excluded. Clinical correlation.  2. Additional milder 

scattered areas of non specific reactive edema seen within middle and lateral 

cuneiform bones as well as at the 5th proximal phalanx. 





-Ulceration prep with betadine. .3 cm incision made to the lateral aspect of 

ulceration with sterile dissecting scissors to the level of skin at bedside .5 

cc of minimal drainage expressed, clear yellow and purulence like drainage. 

Wound cleansed with saline, Santly applied, dressed with gauze, ABD, kerlix


-Eschar is becoming more fibrous; less maceration noted to the wound


   -c/w santyl


   -Wound likely to level of the skin with some portion likely to level of 

tendon


-Discussed with patient. May need to bring patient to the OR for a more 

aggressive wound debridement and incision and drainage


-Will closely monitor


-Multipodus boots placed b/l


-Patient may WBAT to the heels to the left foot in surgical shoe


-Wound culture - Candida Parapsilosis


-Continue IV abx per ID


-Podiatry will continue to follow while patient in house

## 2018-03-30 NOTE — CP.PCM.CON
History of Present Illness





- History of Present Illness


History of Present Illness: 





I was asked to evaluate patient by Dr Valdez,





Patient is a 67 year od male with PMH HTN DM, pancreatic disease who presents 

with dyspnea.  The patient was in the TCU for management of  foot ulcer, He 

developed dyspnea and acute CHF.  He was brought to the ER, and found to have 

elevated pro BNP.  Troponin was elevated.  He denies current chest pain





Review of Systems





- Constitutional


Constitutional: absent: As Per HPI, Anorexia, Chills, Daytime Sleepiness, 

Excessive Sweating, Fatigue, Fever, Frequent Falls, Headache, Increased Appetite

, Lethargy, Malaise, Night Sweats, Snoring, Sleep Apnea, Weight Gain, Weight 

Loss, Weakness, Other





- EENT


Eyes: absent: As Per HPI, Blind Spots, Blurred Vision, Change in Vision, 

Decreased Night Vision, Diplopia, Discharge, Dry Eye, Exophthalmos, Floaters, 

Irritation, Itchy Eyes, Loss of Peripheral Vision, Pain, Photophobia, Requires 

Corrective Lenses, Sees Flashes, Spots in Vision, Tunnel Vision, Other Visual 

Disturbances, Loss of Vision, Other


Ears: absent: As Per HPI, Decreased Hearing, Ear Discharge, Ear Pain, Tinnitus, 

Abnormal Hearing, Disequilibrium, Dizziness, Other


Nose/Mouth/Throat: absent: As Per HPI, Epistaxis, Nasal Congestion, Nasal 

Discharge, Nasal Obstruction, Nasal Trauma, Nose Pain, Post Nasal Drip, Sinus 

Pain, Sinus Pressure, Bleeding Gums, Change in Voice, Dental Pain, Dry Mouth, 

Dysphagia, Halitosis, Hoarsness, Lip Swelling, Mouth Lesions, Mouth Pain, 

Odynophagia, Sore Throat, Throat Swelling, Tongue Swelling, Facial Pain, Neck 

Pain, Neck Mass, Other





- Cardiovascular


Cardiovascular: Dyspnea





- Respiratory


Respiratory: absent: As Per HPI, Cough, Dyspnea, Hemoptysis, Dyspnea on Exertion

, Wheezing, Snoring, Stridor, Pain on Inspiration, Chest Congestion, Excessive 

Mucous Production, Change in Mucous Color, Pain with Coughing, Other





- Gastrointestinal


Gastrointestinal: absent: As Per HPI, Abdominal Pain, Belching, Bloating, 

Change in Bowel Habits, Change in Stool Character, Coffee Ground Emesis, 

Constipation, Cramping, Diarrhea, Dyspepsia, Dysphagia, Early Satiety, 

Excessive Flatus, Fecal Incontinence, Heartburn, Hematemesis, Hematochezia, 

Loose Stools, Melena, Nausea, Odynophagia, Temesmus, Vomiting, Other





- Genitourinary


Genitourinary: absent: As Per HPI, Change in Urinary Stream, Difficulty 

Urinating, Dysuria, Flank Pain, Hematuria, Pyuria, Nocturia, Urinary 

Incontinence, Urinary Frequency, Urinary Hesitance, Urinary Urgency, Voiding 

Freq/Small Amts, Freq UTI, Hx Renal/Bladder Calculi, Hx /Renal Surgery, 

Bladder Distension, Other





- Musculoskeletal


Musculoskeletal: absent: As Per HPI, Abnormal Gait, Arthralgias, Atrophy, Back 

Pain, Deformity, Joint Swelling, Limited Range of Motion, Loss of Height, 

Muscle Cramps, Muscle Weakness, Myalgias, Neck Pain, Numbness, Radiating Pain 

into Limb, Stiffness, Tingling, Other





- Integumentary


Integumentary: absent: As Per HPI, Acne, Alopecia, Bleeding Lesions, Change in 

Hair, Change in Nails, Change in Pigmentation, Changing Lesions, Dry Skin, 

Erythema, Furuncle, Hirsutism, Lesions, New Lesions, Non-Healing Lesions, 

Photosensitivity, Pruritus, Rash, Skin Pain, Skin Ulcer, Sores, Striae, Swelling

, Unusual Bruising, Wounds, Jaundice, Other





- Neurological


Neurological: absent: As Per HPI, Abnormal Gait, Abnormal Hearing, Abnormal 

Movements, Abnormal Speech, Behavioral Changes, Burning Sensations, Confusion, 

Convulsions, Disequilibrium, Dizziness, Numbness, Focal Weakness, Frequent Falls

, Headaches, Lack of Coordination, Loss of Vision, Memory Loss, Paresthesias, 

Radicular Pain, Restless Legs, Sensory Deficit, Syncope, Tingling, Tremor, 

Vertigo, Weakness, Other Visual Disturbances, Other





- Psychiatric


Psychiatric: absent: As Per HPI, Abnormal Sleep Pattern, Anhedonia, Anxiety, 

Auditory Hallucinations, Behavioral Changes, Change in Appetite, Change in 

Libido, Confusion, Depression, Difficulty Concentrating, Hallucinations, 

Homicidal Ideation, Hopelessness, Irritability, Memory Loss, Mood Swings, Panic 

Attacks, Paranoia, Suicidal Ideation, Visual Hallucinations, Tactile 

Hallucinations, Other





- Endocrine


Endocrine: absent: As Per HPI, Change in Body Appearance, Change in Libido, 

Cold Intolorance, Deepening of Voice, Excessive Sweating, Fatigue, Flushing, 

Heat Intolorance, Increase in Ring/Shoe/Hat Size, Palpitations, Polydipsia, 

Polyphagia, Polyuria, Other





- Hematologic/Lymphatic


Hematologic: absent: As Per HPI, Easy Bleeding, Easy Bruising, Lymphadenopathy, 

Other





Past Patient History





- Past Social History


Alcohol: Occasional


Drugs: Denies





- CARDIAC


Hx Cardiac Disorders: No





- PULMONARY


Hx Respiratory Disorders: No





- NEUROLOGICAL


Hx Neurological Disorder: No





- RENAL


Hx Chronic Kidney Disease: No





- ENDOCRINE/METABOLIC


Hx Endocrine Disorders: Yes


Hx Diabetes Mellitus Type 2: Yes





- HEMATOLOGICAL/ONCOLOGICAL


Hx Blood Disorders: No





- MUSCULOSKELETAL/RHEUMATOLOGICAL


Hx Musculoskeletal Disorders: No





- GASTROINTESTINAL


Hx Pancreatitis: Yes





- PSYCHIATRIC


Hx Psychophysiologic Disorder: No


Hx Substance Use: No





- SURGICAL HISTORY


Hx Surgeries: No


Other/Comment: removal of pancreas





- ANESTHESIA


Hx Anesthesia: Yes


Hx Anesthesia Reactions: No


Hx Malignant Hyperthermia: No





Meds


Allergies/Adverse Reactions: 


 Allergies











Allergy/AdvReac Type Severity Reaction Status Date / Time


 


No Known Allergies Allergy   Verified 03/30/18 06:46














- Medications


Medications: 


 Current Medications





Acetaminophen (Tylenol 325mg Tab)  650 mg PO Q4 PRN


   PRN Reason: Fever >100.4 F


Collagenase (Santyl)  1 applic TOP DAILY [facility]


Dextrose (Dextrose 50% Inj)  0 ml IV STAT PRN; Protocol


   PRN Reason: Hypoglycemia Protocol


Dextrose (Glutose 15)  0 gm PO ONCE PRN; Protocol


   PRN Reason: Hypoglycemia Protocol


Ferrous Sulfate (Feosol)  325 mg PO DAILY [facility]


Fluconazole (Diflucan)  200 mg PO DAILY [facility]


   PRN Reason: Protocol


   Last Admin: 03/30/18 14:27 Dose:  200 mg


Furosemide (Lasix)  40 mg PO DAILY [facility]


Gabapentin (Neurontin)  400 mg PO HS [facility]


Glucagon (Glucagen Diagnostic Kit)  0 mg IM STAT PRN; Protocol


   PRN Reason: Hypoglycemia Protocol


Vancomycin HCl 1 gm/ Sodium (Chloride)  250 mls @ 166.667 mls/hr IVPB DAILY [facility]


   PRN Reason: Protocol


Insulin Detemir (Levemir)  18 units SC HS [facility]


Insulin Human Lispro (Humalog)  4 units SC AC [facility]


   Last Admin: 03/30/18 13:43 Dose:  4 units


Losartan Potassium (Cozaar)  25 mg PO DAILY [facility]


Naproxen (Naproxen)  500 mg PO Q12 [facility]


Pantoprazole Sodium (Protonix Ec Tab)  40 mg PO DAILY [facility]


Potassium Chloride (K-Dur 20 Meq Er Tab)  20 meq PO DAILY [facility]


Sitagliptin Phosphate (Januvia)  100 mg PO DAILY@0800 [facility]


Temazepam (Restoril)  15 mg PO HS PRN


   PRN Reason: Insomnia











Physical Exam





- Constitutional


Appears: Non-toxic





- Head Exam


Head Exam: NORMAL INSPECTION





- Eye Exam


Eye Exam: Normal appearance





- ENT Exam


ENT Exam: Mucous Membranes Moist





- Neck Exam


Neck exam: Positive for: Full Rom





- Respiratory Exam


Respiratory Exam: NORMAL BREATHING PATTERN





- Cardiovascular Exam


Cardiovascular Exam: REGULAR RHYTHM





- GI/Abdominal Exam


GI & Abdominal Exam: Normal Bowel Sounds





- Rectal Exam


Rectal Exam: Deferred





- Extremities Exam


Extremities exam: Negative for: pedal edema





- Back Exam


Back exam: NORMAL INSPECTION





- Neurological Exam


Neurological exam: Alert, Oriented x3





- Psychiatric Exam


Psychiatric exam: Normal Affect





- Skin


Skin Exam: Normal Color





Results





- Vital Signs


Recent Vital Signs: 


 Last Vital Signs











Temp  98.7 F   03/30/18 06:47


 


Pulse  89   03/30/18 14:09


 


Resp  26 H  03/30/18 14:09


 


BP  136/80   03/30/18 14:09


 


Pulse Ox  96   03/30/18 14:09














- Labs


Labs: 


 Laboratory Results - last 24 hr











  03/30/18 03/30/18 03/30/18





  08:08 08:08 11:04


 


PT   13.5 H 


 


INR   1.2 


 


APTT   35.5 


 


POC Glucose (mg/dL)    288 H


 


Lactate Dehydrogenase  585  


 


Total Creatine Kinase  86  


 


Troponin I  1.0400 H*  


 


NT-Pro-B Natriuret Pep  3600 H  














- EKG Data


EKG Interpreted by: Myself





Assessment & Plan


(1) NSTEMI (non-ST elevated myocardial infarction)


Assessment and Plan: 


previous echocardiogram shows mild apical hypokinesis.  The patient likely has 

underlying CAD.  he has risk factors for CAD given DM.  recommend cardiac cath 

Monday.  add lovenox, ASA.  trend troponin.  statin therapy.


Status: Acute

## 2018-03-30 NOTE — CP.PCM.HP
<Andre Bay - Last Filed: 03/30/18 14:07>





History of Present Illness





- History of Present Illness


History of Present Illness: 





66 y/o M with PMhx of uncontrolled IDDM and bipolar disorder that was being 

treated at [facility] for left foot infected ulcer and recently diagnosed CHF had to 

be transferred overnight to ED for persistent SOB. As per records patient woke 

up short of breath, Osat was low 90s. He dint not have any other complains and 

today she denies CP, palpitations, SOB is improved, patient doesnt know why he 

became SOB. Afebrile. He was on IV abx broad coverage for septic episode during 

his stay in TCU. He was followed by ID and Podiatry. Recent Echo showed 

systolic CHF with valvulopathy and pulm HTN patient was started on ACEs. BS 

have been difficult to control and insulin was being managed by 

Endocrinologist. Denies diarrhea vomiting, nausea, Hx of CAD, COPD. Pain in L/

LE is conrtolled with meds.





Present on Admission





- Present on Admission


Any Indicators Present on Admission: Yes


History of Uncontrolled Diabetes: Yes





Review of Systems





- Review of Systems


All systems: reviewed and no additional remarkable complaints except (as per HPi

)





Past Patient History





- Past Social History


Alcohol: Occasional


Drugs: Denies





- CARDIAC


Hx Cardiac Disorders: No





- PULMONARY


Hx Respiratory Disorders: No





- NEUROLOGICAL


Hx Neurological Disorder: No





- RENAL


Hx Chronic Kidney Disease: No





- ENDOCRINE/METABOLIC


Hx Endocrine Disorders: Yes


Hx Diabetes Mellitus Type 2: Yes





- HEMATOLOGICAL/ONCOLOGICAL


Hx Blood Disorders: No





- MUSCULOSKELETAL/RHEUMATOLOGICAL


Hx Musculoskeletal Disorders: No





- GASTROINTESTINAL


Hx Pancreatitis: Yes





- PSYCHIATRIC


Hx Psychophysiologic Disorder: No


Hx Substance Use: No





- SURGICAL HISTORY


Hx Surgeries: No


Other/Comment: removal of pancreas





- ANESTHESIA


Hx Anesthesia: Yes


Hx Anesthesia Reactions: No


Hx Malignant Hyperthermia: No





Meds


Allergies/Adverse Reactions: 


 Allergies











Allergy/AdvReac Type Severity Reaction Status Date / Time


 


No Known Allergies Allergy   Verified 03/30/18 06:46














Physical Exam





- Constitutional


Appears: Non-toxic, No Acute Distress





- Eye Exam


Eye Exam: EOMI, PERRL





- ENT Exam


ENT Exam: Mucous Membranes Moist





- Respiratory Exam


Respiratory Exam: Decreased Breath Sounds, NORMAL BREATHING PATTERN.  absent: 

Wheezes, Respiratory Distress





- Cardiovascular Exam


Cardiovascular Exam: REGULAR RHYTHM, +S1, +S2.  absent: Gallop





- GI/Abdominal Exam


GI & Abdominal Exam: Normal Bowel Sounds, Soft.  absent: Distended, Firm, 

Rebound, Rigid, Tenderness





- Extremities Exam


Extremities exam: Positive for: pedal edema (trace), tenderness (L/foot covered 

with dressing clean and dry)





- Back Exam


Back exam: absent: CVA tenderness (L), CVA tenderness (R)





- Neurological Exam


Neurological exam: Alert, Oriented x3





- Skin


Skin Exam: Warm





Results





- Vital Signs


Recent Vital Signs: 





 Last Vital Signs











Temp  98.7 F   03/30/18 06:47


 


Pulse  84   03/30/18 11:45


 


Resp  24   03/30/18 11:45


 


BP  139/67   03/30/18 11:45


 


Pulse Ox  99   03/30/18 11:45














- Labs


Labs: 





 Laboratory Results - last 24 hr











  03/30/18 03/30/18 03/30/18





  08:08 08:08 11:04


 


PT   13.5 H 


 


INR   1.2 


 


APTT   35.5 


 


POC Glucose (mg/dL)    288 H


 


Lactate Dehydrogenase  585  


 


Total Creatine Kinase  86  


 


Troponin I  1.0400 H*  


 


NT-Pro-B Natriuret Pep  3600 H  














Assessment & Plan





- Assessment and Plan (Free Text)


Assessment: 





Acute on chronic CHF


Systolic with valvulopathy


Echo report reviewed as well as elevated ProBnp


Cardio consult: F/U recs


Stable


Fluid restriction


ID consulted about possiblity of switching abx to PO since patient receiving 

significant amount of fluids with IV abx


  Advised to c/w Vanco only


C/W Lasix daily





Elevated troponins


Chronic, Apical hypokynesis on Echo


Cardio consult


no CP or Hx of CP





Patient also with loculated pericardial effusion, low voltage EKG and long QT


Start Naproxen 500 mg BID





Left foot infected Wound Cx postive for candida


Start Diflucan 200 mg daily


Podiatry consult


Wound care


Watch for signs of sepsis(No sepsis criteria at this time)


IV Vanco








<Gunner Valdez - Last Filed: 04/01/18 23:30>





Results





- Vital Signs


Recent Vital Signs: 





 Last Vital Signs











Temp  98.1 F   04/01/18 19:49


 


Pulse  88   04/01/18 19:49


 


Resp  19   04/01/18 19:49


 


BP  122/70   04/01/18 19:49


 


Pulse Ox  95   04/01/18 19:49














- Labs


Result Diagrams: 


 03/31/18 06:39





 03/31/18 06:39


Labs: 





 Laboratory Results - last 24 hr











  04/01/18 04/01/18 04/01/18





  05:29 06:15 10:42


 


POC Glucose (mg/dL)  189 H   70


 


Vancomycin Trough   9.5 














  04/01/18 04/01/18





  16:58 21:28


 


POC Glucose (mg/dL)  223 H  193 H


 


Vancomycin Trough  














Assessment & Plan





- Assessment and Plan (Free Text)


Plan: 





I was present during evaluation and discussed with Dr Bay re plans of 

care and tx.





Gunner Valdez M.D.

## 2018-03-30 NOTE — PN
DATE:



ENDO FOLLOWUP NOTE



LOCATION:  Room 708.



SUBJECTIVE:  This is a 67-year-old male with recent uncontrolled type 2

insulin-requiring diabetes, now being followed closely for metabolic

management.  His glycemic levels are fluctuating, but much improved at this

time and the glucose levels overnight showed levels of _____ mg/dL to 232

mg/dL.  So at this time to allow for dose equilibration, we will continue

the same modified basal and bolus insulin regimen as ordered.  We will

continue Humalog given as 4 units subcu t.i.d. before meals and Levemir

given as basal insulin at _____ units subcu at bedtime daily as given.  We

will continue also the Januvia given as 100 mg once daily as ordered.  We

will follow with his medical doctor for outpatient medical and diabetic

management.  He is being scheduled for discharge today as noted.





__________________________________________

Bernie Collins MD





DD:  03/30/2018 11:28:40

DT:  03/30/2018 11:31:42

Job # [id number]

## 2018-03-30 NOTE — RAD
HISTORY:

dyspnea  



COMPARISON:

Chest radiograph dated 03/26/2018. 



FINDINGS:



LUNGS:

Pulmonary vascular congestion. Bibasilar atelectasis.



PLEURA:

Small left and trace right pleural effusions. No pneumothorax 

apparent.



CARDIOVASCULAR:

Atherosclerotic aortic calcifications.  Cardiomediastinal silhouette 

stably enlarged



OSSEOUS STRUCTURES:

Unchanged



VISUALIZED UPPER ABDOMEN:

Normal.



OTHER FINDINGS:

Right upper extremity PICC with catheter tip in the SVC.



IMPRESSION:

Pulmonary vascular congestion with small left and trace right pleural 

effusions.

## 2018-03-31 LAB
BUN SERPL-MCNC: 9 MG/DL (ref 9–20)
CALCIUM SERPL-MCNC: 8.8 MG/DL (ref 8.4–10.2)
ERYTHROCYTE [DISTWIDTH] IN BLOOD BY AUTOMATED COUNT: 16.6 % (ref 11.5–14.5)
GFR NON-AFRICAN AMERICAN: > 60
HGB BLD-MCNC: 8.8 G/DL (ref 12–18)
MCH RBC QN AUTO: 26.7 PG (ref 27–31)
MCHC RBC AUTO-ENTMCNC: 32.6 G/DL (ref 33–37)
MCV RBC AUTO: 82.1 FL (ref 80–94)
PLATELET # BLD: 570 K/UL (ref 130–400)
RBC # BLD AUTO: 3.3 MIL/UL (ref 4.4–5.9)
WBC # BLD AUTO: 13.3 K/UL (ref 4.8–10.8)

## 2018-03-31 RX ADMIN — INSULIN LISPRO SCH UNITS: 100 INJECTION, SOLUTION INTRAVENOUS; SUBCUTANEOUS at 08:12

## 2018-03-31 RX ADMIN — INSULIN LISPRO SCH UNITS: 100 INJECTION, SOLUTION INTRAVENOUS; SUBCUTANEOUS at 12:00

## 2018-03-31 RX ADMIN — ENOXAPARIN SODIUM SCH MG: 80 INJECTION SUBCUTANEOUS at 23:10

## 2018-03-31 RX ADMIN — INSULIN DETEMIR SCH UNITS: 100 INJECTION, SOLUTION SUBCUTANEOUS at 23:11

## 2018-03-31 RX ADMIN — COLLAGENASE SANTYL SCH APPLIC: 250 OINTMENT TOPICAL at 16:49

## 2018-03-31 RX ADMIN — ENOXAPARIN SODIUM SCH MG: 80 INJECTION SUBCUTANEOUS at 10:05

## 2018-03-31 RX ADMIN — INSULIN LISPRO SCH UNITS: 100 INJECTION, SOLUTION INTRAVENOUS; SUBCUTANEOUS at 16:49

## 2018-03-31 RX ADMIN — PANTOPRAZOLE SODIUM SCH MG: 40 TABLET, DELAYED RELEASE ORAL at 10:05

## 2018-03-31 RX ADMIN — POTASSIUM CHLORIDE SCH MEQ: 20 TABLET, EXTENDED RELEASE ORAL at 10:07

## 2018-03-31 NOTE — CP.PCM.PN
Subjective





- Date & Time of Evaluation


Date of Evaluation: 03/31/18


Time of Evaluation: 08:00





- Subjective


Subjective: 





events noted


developed NSTEMI/ CHF


left foot wound same


cont Vanco





Objective





- Vital Signs/Intake and Output


Vital Signs (last 24 hours): 


 











Temp Pulse Resp BP Pulse Ox


 


 98.6 F   84   18   100/70   95 


 


 03/31/18 12:00  03/31/18 12:00  03/31/18 12:00  03/31/18 12:00  03/31/18 12:00











- Medications


Medications: 


 Current Medications





Acetaminophen (Tylenol 325mg Tab)  650 mg PO Q4 PRN


   PRN Reason: Fever >100.4 F


Atorvastatin Calcium (Lipitor)  40 mg PO DAILY [facility]


   Last Admin: 03/31/18 10:11 Dose:  40 mg


Collagenase (Santyl)  1 applic TOP DAILY [facility]


Dextrose (Dextrose 50% Inj)  0 ml IV STAT PRN; Protocol


   PRN Reason: Hypoglycemia Protocol


Dextrose (Glutose 15)  0 gm PO ONCE PRN; Protocol


   PRN Reason: Hypoglycemia Protocol


Enoxaparin Sodium (Lovenox)  80 mg SC Q12 [facility]


   PRN Reason: Protocol


   Last Admin: 03/31/18 10:05 Dose:  80 mg


Ferrous Sulfate (Feosol)  325 mg PO DAILY [facility]


   Last Admin: 03/31/18 10:06 Dose:  325 mg


Fluconazole (Diflucan)  200 mg PO DAILY [facility]


   PRN Reason: Protocol


   Last Admin: 03/31/18 10:05 Dose:  200 mg


Furosemide (Lasix)  40 mg PO DAILY [facility]


   Last Admin: 03/31/18 10:07 Dose:  40 mg


Gabapentin (Neurontin)  400 mg PO HS [facility]


   Last Admin: 03/30/18 21:42 Dose:  400 mg


Glucagon (Glucagen Diagnostic Kit)  0 mg IM STAT PRN; Protocol


   PRN Reason: Hypoglycemia Protocol


Vancomycin HCl 1 gm/ Sodium (Chloride)  250 mls @ 166.667 mls/hr IVPB DAILY [facility]


   PRN Reason: Protocol


   Last Admin: 03/31/18 10:12 Dose:  166.667 mls/hr


Insulin Detemir (Levemir)  18 units SC HS [facility]


   Last Admin: 03/30/18 21:43 Dose:  18 units


Insulin Human Lispro (Humalog)  4 units SC AC [facility]


   Last Admin: 03/31/18 08:12 Dose:  4 units


Losartan Potassium (Cozaar)  25 mg PO DAILY [facility]


   Last Admin: 03/31/18 10:06 Dose:  25 mg


Naproxen (Naproxen)  500 mg PO Q12 [facility]


   Last Admin: 03/31/18 10:08 Dose:  500 mg


Pantoprazole Sodium (Protonix Ec Tab)  40 mg PO DAILY [facility]


   Last Admin: 03/31/18 10:05 Dose:  40 mg


Potassium Chloride (K-Dur 20 Meq Er Tab)  20 meq PO DAILY [facility]


   Last Admin: 03/31/18 10:07 Dose:  20 meq


Sitagliptin Phosphate (Januvia)  100 mg PO DAILY@0800 [facility]


   Last Admin: 03/31/18 10:07 Dose:  100 mg


Temazepam (Restoril)  15 mg PO HS PRN


   PRN Reason: Insomnia











- Labs


Labs: 


 





 03/31/18 06:39 





 03/31/18 06:39 





 











PT  13.5 Seconds (9.8-13.1)  H  03/30/18  08:08    


 


INR  1.2  (0.9-1.2)   03/30/18  08:08    


 


APTT  35.5 Seconds (25.6-37.1)   03/30/18  08:08    














- Constitutional


Appears: Non-toxic, Chronically Ill





- Head Exam


Head Exam: NORMOCEPHALIC





- Eye Exam


Eye Exam: PERRL





- ENT Exam


ENT Exam: Mucous Membranes Dry





- Neck Exam


Neck Exam: absent: Lymphadenopathy





- Respiratory Exam


Respiratory Exam: Decreased Breath Sounds, Clear to Ausculation Bilateral





- Cardiovascular Exam


Cardiovascular Exam: REGULAR RHYTHM





- GI/Abdominal Exam


GI & Abdominal Exam: Distended, Soft





- Rectal Exam


Rectal Exam: Deferred





-  Exam


 Exam: NORMAL INSPECTION





- Extremities Exam


Extremities Exam: absent: Pedal Edema





- Back Exam


Back Exam: absent: CVA tenderness (L), CVA tenderness (R)





- Neurological Exam


Neurological Exam: Alert, Awake, Oriented x3


Neuro motor strength exam: Left Upper Extremity: 4, Right Upper Extremity: 4, 

Left Lower Extremity: 4, Right Lower Extremity: 4





- Psychiatric Exam


Psychiatric exam: Depressed





Assessment and Plan


(1) CHF (congestive heart failure)


Status: Acute   





(2) Diabetes mellitus type 2 in nonobese


Status: Acute   





(3) Sepsis


Status: Acute   





- Assessment and Plan (Free Text)


Assessment: 





cont iv rx


cardiac and vascular eval ongoing


cath on monday


cont iv antibiotics

## 2018-03-31 NOTE — CP.PCM.PN
Subjective





- Date & Time of Evaluation


Date of Evaluation: 03/31/18


Time of Evaluation: 07:10





- Subjective


Subjective: 





patient has no new dyspnea.





Objective





- Vital Signs/Intake and Output


Vital Signs (last 24 hours): 


 











Temp Pulse Resp BP Pulse Ox


 


 97.9 F   77   16   122/69   98 


 


 03/31/18 05:27  03/31/18 05:27  03/31/18 05:27  03/31/18 05:27  03/31/18 05:27











- Medications


Medications: 


 Current Medications





Acetaminophen (Tylenol 325mg Tab)  650 mg PO Q4 PRN


   PRN Reason: Fever >100.4 F


Collagenase (Santyl)  1 applic [facility]


Dextrose (Dextrose 50% Inj)  0 ml IV STAT PRN; Protocol


   PRN Reason: Hypoglycemia Protocol


Dextrose (Glutose 15)  0 gm PO ONCE PRN; Protocol


   PRN Reason: Hypoglycemia Protocol


Enoxaparin Sodium (Lovenox)  80 mg SC Q12 [facility]


   PRN Reason: Protocol


   Last Admin: 03/30/18 21:43 Dose:  Not Given


Ferrous Sulfate (Feosol)  325 mg PO [facility]ILY [facility]


Fluconazole (Diflucan)  200 mg PO DAILY [facility]


   PRN Reason: Protocol


   Last Admin: 03/30/18 14:27 Dose:  200 mg


Furosemide (Lasix)  40 mg PO DAILY [facility]


Gabapentin (Neurontin)  400 mg PO HS [facility]


   Last Admin: 03/30/18 21:42 Dose:  400 mg


Glucagon (Glucagen Diagnostic Kit)  0 mg IM STAT PRN; Protocol


   PRN Reason: Hypoglycemia Protocol


Vancomycin HCl 1 gm/ Sodium (Chloride)  250 mls @ 166.667 mls/hr IVPB DAILY [facility]


   PRN Reason: Protocol


Insulin Detemir (Levemir)  18 units SC HS [facility]


   Last Admin: 03/30/18 21:43 Dose:  18 units


Insulin Human Lispro (Humalog)  4 units SC AC [facility]


   Last Admin: 03/30/18 13:43 Dose:  4 units


Losartan Potassium (Cozaar)  25 mg PO DAILY [facility]


Naproxen (Naproxen)  500 mg PO Q12 [facility]


   Last Admin: 03/30/18 22:01 Dose:  500 mg


Pantoprazole Sodium (Protonix Ec Tab)  40 mg PO DAILY [facility]


Potassium Chloride (K-Dur 20 Meq Er Tab)  20 meq PO DAILY [facility]


Sitagliptin Phosphate (Januvia)  100 mg PO DAILY@0800 [facility]


Temazepam (Restoril)  15 mg PO HS PRN


   PRN Reason: Insomnia











- Labs


Labs: 


 











PT  13.5 Seconds (9.8-13.1)  H  03/30/18  08:08    


 


INR  1.2  (0.9-1.2)   03/30/18  08:08    


 


APTT  35.5 Seconds (25.6-37.1)   03/30/18  08:08    














- Constitutional


Appears: Non-toxic





- Head Exam


Head Exam: NORMAL INSPECTION





- Eye Exam


Eye Exam: Normal appearance





- ENT Exam


ENT Exam: Mucous Membranes Moist





- Neck Exam


Neck Exam: Full ROM





- Respiratory Exam


Respiratory Exam: NORMAL BREATHING PATTERN





- Cardiovascular Exam


Cardiovascular Exam: REGULAR RHYTHM





- GI/Abdominal Exam


GI & Abdominal Exam: Normal Bowel Sounds





- Rectal Exam


Rectal Exam: Deferred





- Extremities Exam


Extremities Exam: Normal Inspection





- Back Exam


Back Exam: NORMAL INSPECTION





- Neurological Exam


Neurological Exam: Alert





- Psychiatric Exam


Psychiatric exam: Normal Affect





- Skin


Skin Exam: Normal Color





Assessment and Plan


(1) NSTEMI (non-ST elevated myocardial infarction)


Assessment & Plan: 


troponin trending mick.  start statin.  on lovenox.  maintain telemetry.  

cardiac cath Monday.  will transfer to [facility] Monday morning


Status: Acute

## 2018-04-01 RX ADMIN — INSULIN LISPRO SCH UNITS: 100 INJECTION, SOLUTION INTRAVENOUS; SUBCUTANEOUS at 09:05

## 2018-04-01 RX ADMIN — POTASSIUM CHLORIDE SCH MEQ: 20 TABLET, EXTENDED RELEASE ORAL at 09:02

## 2018-04-01 RX ADMIN — PANTOPRAZOLE SODIUM SCH MG: 40 TABLET, DELAYED RELEASE ORAL at 09:03

## 2018-04-01 RX ADMIN — COLLAGENASE SANTYL SCH: 250 OINTMENT TOPICAL at 11:25

## 2018-04-01 RX ADMIN — INSULIN LISPRO SCH UNITS: 100 INJECTION, SOLUTION INTRAVENOUS; SUBCUTANEOUS at 17:10

## 2018-04-01 RX ADMIN — WHITE PETROLATUM SCH PKT: 1 OINTMENT TOPICAL at 12:50

## 2018-04-01 RX ADMIN — ENOXAPARIN SODIUM SCH MG: 80 INJECTION SUBCUTANEOUS at 09:01

## 2018-04-01 RX ADMIN — INSULIN DETEMIR SCH UNITS: 100 INJECTION, SOLUTION SUBCUTANEOUS at 22:39

## 2018-04-01 RX ADMIN — INSULIN LISPRO SCH UNITS: 100 INJECTION, SOLUTION INTRAVENOUS; SUBCUTANEOUS at 09:06

## 2018-04-01 RX ADMIN — ENOXAPARIN SODIUM SCH MG: 80 INJECTION SUBCUTANEOUS at 22:36

## 2018-04-01 RX ADMIN — INSULIN LISPRO SCH: 100 INJECTION, SOLUTION INTRAVENOUS; SUBCUTANEOUS at 11:15

## 2018-04-01 NOTE — CP.PCM.PN
Subjective





- Date & Time of Evaluation


Date of Evaluation: 04/01/18


Time of Evaluation: 13:15





- Subjective


Subjective: 


Patient remains stable


 Has no fever


 Has no chest pain or SOB


Currently on Lasix and iv antibiotics


 Has very good urine output.


Noted accucheck to be better less than 200.








Objective





- Vital Signs/Intake and Output


Vital Signs (last 24 hours): 


 











Temp Pulse Resp BP Pulse Ox


 


 98.1 F   88   19   122/70   95 


 


 04/01/18 19:49  04/01/18 19:49  04/01/18 19:49  04/01/18 19:49  04/01/18 19:49








Intake and Output: 


 











 04/01/18 04/02/18





 18:59 06:59


 


Intake Total 1250 


 


Output Total 1401 


 


Balance -151 














- Medications


Medications: 


 Current Medications





Acetaminophen (Tylenol 325mg Tab)  650 mg PO Q4 PRN


   PRN Reason: Fever >100.4 F


Atorvastatin Calcium (Lipitor)  40 mg PO DAILY [facility]


   Last Admin: 04/01/18 09:01 Dose:  40 mg


Collagenase (Santyl)  1 applic TOP DAILY [facility]


   Last Admin: 04/01/18 11:25 Dose:  Not Given


Dextrose (Dextrose 50% Inj)  0 ml IV STAT PRN; Protocol


   PRN Reason: Hypoglycemia Protocol


Dextrose (Glutose 15)  0 gm PO ONCE PRN; Protocol


   PRN Reason: Hypoglycemia Protocol


Emollient Ointment (Vaseline Oint)  1 pkt TOP DAILY [facility]


   Last Admin: 04/01/18 12:50 Dose:  1 pkt


Enoxaparin Sodium (Lovenox)  80 mg SC Q12 [facility]


   PRN Reason: Protocol


   Last Admin: 04/01/18 22:36 Dose:  80 mg


Ferrous Sulfate (Feosol)  325 mg PO DAILY [facility]


   Last Admin: 04/01/18 09:04 Dose:  325 mg


Fluconazole (Diflucan)  200 mg PO DAILY [facility]


   PRN Reason: Protocol


   Last Admin: 04/01/18 09:04 Dose:  200 mg


Furosemide (Lasix)  40 mg PO DAILY [facility]


   Last Admin: 04/01/18 09:03 Dose:  40 mg


Gabapentin (Neurontin)  400 mg PO HS [facility]


   Last Admin: 04/01/18 22:38 Dose:  400 mg


Glucagon (Glucagen Diagnostic Kit)  0 mg IM STAT PRN; Protocol


   PRN Reason: Hypoglycemia Protocol


Vancomycin HCl 1 gm/ Sodium (Chloride)  250 mls @ 166.667 mls/hr IVPB DAILY [facility]


   PRN Reason: Protocol


   Last Admin: 04/01/18 09:01 Dose:  166.667 mls/hr


Insulin Detemir (Levemir)  18 units SC HS [facility]


   Last Admin: 04/01/18 22:39 Dose:  18 units


Insulin Human Lispro (Humalog)  4 units SC AC [facility]


   Last Admin: 04/01/18 17:10 Dose:  4 units


Losartan Potassium (Cozaar)  25 mg PO DAILY [facility]


   Last Admin: 04/01/18 09:02 Dose:  25 mg


Naproxen (Naproxen)  500 mg PO Q12 [facility]


   Last Admin: 04/01/18 22:38 Dose:  500 mg


Pantoprazole Sodium (Protonix Ec Tab)  40 mg PO DAILY [facility]


   Last Admin: 04/01/18 09:03 Dose:  40 mg


Potassium Chloride (K-Dur 20 Meq Er Tab)  20 meq PO DAILY [facility]


   Last Admin: 04/01/18 09:02 Dose:  20 meq


Sitagliptin Phosphate (Januvia)  100 mg PO DAILY@0800 [facility]


   Last Admin: 04/01/18 09:03 Dose:  100 mg


Temazepam (Restoril)  15 mg PO  PRN


   PRN Reason: Insomnia











- Labs


Labs: 


 





 03/31/18 06:39 





 03/31/18 06:39 





 











PT  13.5 Seconds (9.8-13.1)  H  03/30/18  08:08    


 


INR  1.2  (0.9-1.2)   03/30/18  08:08    


 


APTT  35.5 Seconds (25.6-37.1)   03/30/18  08:08    














- Head Exam


Head Exam: NORMAL INSPECTION





- Eye Exam


Eye Exam: Normal appearance





- Respiratory Exam


Respiratory Exam: Clear to Ausculation Bilateral





- Cardiovascular Exam


Cardiovascular Exam: REGULAR RHYTHM





- GI/Abdominal Exam


GI & Abdominal Exam: Normal Bowel Sounds





Assessment and Plan


(1) CHF (congestive heart failure)


Status: Acute   





(2) NSTEMI (non-ST elevated myocardial infarction)


Status: Acute   





(3) Diabetes mellitus type 2 in nonobese


Status: Acute   





(4) Leukocytosis


Status: Acute   





(5) Sepsis


Status: Acute   





(6) Chronic ulcer of leg


Status: Chronic   





- Assessment and Plan (Free Text)


Plan: 





Con tmeds


 adjust insulin dose


 cont meds


 for cardiac cath

## 2018-04-01 NOTE — CP.PCM.PN
Subjective





- Date & Time of Evaluation


Date of Evaluation: 04/01/18


Time of Evaluation: 11:13





- Subjective


Subjective: 





67 year male with PMH of DM and recently diagnosed CHF seen and evaluated at 

bedside for continued treatment of left foot ulceration. Patient AAO x 3 and NAD

, resting comfortably in bed at time of examination. Denies any acute overnight 

event or new pedal complaints. States that pain is well controlled at this 

time. Denies any recent N/V/F/C/CP/SOB/D/posterior calf pain when squeezed. 





Objective





- Vital Signs/Intake and Output


Vital Signs (last 24 hours): 


 











Temp Pulse Resp BP Pulse Ox


 


 98.6 F   73   18   143/83   97 


 


 04/01/18 08:00  04/01/18 09:02  04/01/18 08:00  04/01/18 09:03  04/01/18 08:00











- Medications


Medications: 


 Current Medications





Acetaminophen (Tylenol 325mg Tab)  650 mg PO Q4 PRN


   PRN Reason: Fever >100.4 F


Atorvastatin Calcium (Lipitor)  40 mg PO DAILY [facility]


   Last Admin: 04/01/18 09:01 Dose:  40 mg


Collagenase (Santyl)  1 applic TOP DAILY [facility]


   Last Admin: 03/31/18 16:49 Dose:  1 applic


Dextrose (Dextrose 50% Inj)  0 ml IV STAT PRN; Protocol


   PRN Reason: Hypoglycemia Protocol


Dextrose (Glutose 15)  0 gm PO ONCE PRN; Protocol


   PRN Reason: Hypoglycemia Protocol


Enoxaparin Sodium (Lovenox)  80 mg SC Q12 [facility]


   PRN Reason: Protocol


   Last Admin: 04/01/18 09:01 Dose:  80 mg


Ferrous Sulfate (Feosol)  325 mg PO DAILY [facility]


   Last Admin: 04/01/18 09:04 Dose:  325 mg


Fluconazole (Diflucan)  200 mg PO DAILY [facility]


   PRN Reason: Protocol


   Last Admin: 04/01/18 09:04 Dose:  200 mg


Furosemide (Lasix)  40 mg PO DAILY [facility]


   Last Admin: 04/01/18 09:03 Dose:  40 mg


Gabapentin (Neurontin)  400 mg PO HS [facility]


   Last Admin: 03/31/18 23:11 Dose:  400 mg


Glucagon (Glucagen Diagnostic Kit)  0 mg IM STAT PRN; Protocol


   PRN Reason: Hypoglycemia Protocol


Vancomycin HCl 1 gm/ Sodium (Chloride)  250 mls @ 166.667 mls/hr IVPB DAILY [facility]


   PRN Reason: Protocol


   Last Admin: 04/01/18 09:01 Dose:  166.667 mls/hr


Insulin Detemir (Levemir)  18 units SC HS [facility]


   Last Admin: 03/31/18 23:11 Dose:  18 units


Insulin Human Lispro (Humalog)  4 units SC AC [facility]


   Last Admin: 04/01/18 09:06 Dose:  4 units


Losartan Potassium (Cozaar)  25 mg PO DAILY [facility]


   Last Admin: 04/01/18 09:02 Dose:  25 mg


Naproxen (Naproxen)  500 mg PO Q12 [facility]


   Last Admin: 04/01/18 09:04 Dose:  500 mg


Pantoprazole Sodium (Protonix Ec Tab)  40 mg PO DAILY [facility]


   Last Admin: 04/01/18 09:03 Dose:  40 mg


Potassium Chloride (K-Dur 20 Meq Er Tab)  20 meq PO DAILY [facility]


   Last Admin: 04/01/18 09:02 Dose:  20 meq


Sitagliptin Phosphate (Januvia)  100 mg PO DAILY@0800 [facility]


   Last Admin: 04/01/18 09:03 Dose:  100 mg


Temazepam (Restoril)  15 mg PO  PRN


   PRN Reason: Insomnia











- Labs


Labs: 


 





 03/31/18 06:39 





 03/31/18 06:39 





 











PT  13.5 Seconds (9.8-13.1)  H  03/30/18  08:08    


 


INR  1.2  (0.9-1.2)   03/30/18  08:08    


 


APTT  35.5 Seconds (25.6-37.1)   03/30/18  08:08    














- Constitutional


Appears: Well, Non-toxic, No Acute Distress





- Extremities Exam


Additional comments: 





VASC: LLE DP and PT pulses nonpalpable. RLE DP pulse weakly palpable 1/4, PT 

nonpalpable. CFT <3 seconds to all digits x10. Digital hair is present. Mild 

nonpitting edmea noted to left foot, most prominent on the plantar aspect of 

the medial arch, temperature to the LE is warm to warm bilaterally





NEURO: Protective and gross sensation diminished





DERM: Fibro-necrotic eschar located on the entire lateral aspect of dorsum foot 

is now becoming more fibrotic (70%) with periwound skin erosion and detachment 

noted. Mild fluctanance appreciated with palpation lateral of the ulceration. 

Majority of the wound most likely to level of skin, with portion of ulceration 

likely down to level of tendon. Mild periwound erythema surrounding entire 

eschar, no streaking appreciated, no malodor, no purulent drainage or drainage 

expressed. Less maceration noted to entire dorsolateral ulceration compared to 

yesterday, no drainage expressed, no fluctance noted





ORTHO: mild pain with palpation to the left foot, improving





- Neurological Exam


Neurological Exam: Alert, Awake, Oriented x3





- Psychiatric Exam


Psychiatric exam: Normal Affect, Normal Mood





Assessment and Plan





- Assessment and Plan (Free Text)


Assessment: 





67 year male with PMH of DM and recently diagnosed CHF seen and evaluated at 

bedside for continued treatment of left foot ulceration


Plan: 





Patient seen and evalauted at bedside


Plan discussed with attending, Dr. Ascencio


Most recent charts, labs and vitals reviewed


WBC 13.3, afebrile


Wound cx Candida Parasilosis


Continue IV abx per ID


Continue pain management per Medicine


Foot MRI 3/30: Abscess of left foot


No plan for surgical intervention at this time


Patient for Cardiac Cath at [facility] with Dr. Castellanos tomorrow


Podiatry will continue to follow while patient in house

## 2018-04-01 NOTE — CP.PCM.PN
Subjective





- Date & Time of Evaluation


Date of Evaluation: 03/31/18


Time of Evaluation: 12:10





- Subjective


Subjective: 





Patient feels a lot better


Has no SOB


Noted significant amount of urine output.


Has no chest pain or SOB


Has no fever but note elevated WBC.





Objective





- Vital Signs/Intake and Output


Vital Signs (last 24 hours): 


 











Temp Pulse Resp BP Pulse Ox


 


 98.1 F   88   19   122/70   95 


 


 04/01/18 19:49  04/01/18 19:49  04/01/18 19:49  04/01/18 19:49  04/01/18 19:49








Intake and Output: 


 











 04/01/18 04/02/18





 18:59 06:59


 


Intake Total 1250 


 


Output Total 1401 


 


Balance -151 














- Medications


Medications: 


 Current Medications





Acetaminophen (Tylenol 325mg Tab)  650 mg PO Q4 PRN


   PRN Reason: Fever >100.4 F


Atorvastatin Calcium (Lipitor)  40 mg PO DAILY [facility]


   Last Admin: 04/01/18 09:01 Dose:  40 mg


Collagenase (Santyl)  1 applic TOP DAILY [facility]


   Last Admin: 04/01/18 11:25 Dose:  Not Given


Dextrose (Dextrose 50% Inj)  0 ml IV STAT PRN; Protocol


   PRN Reason: Hypoglycemia Protocol


Dextrose (Glutose 15)  0 gm PO ONCE PRN; Protocol


   PRN Reason: Hypoglycemia Protocol


Emollient Ointment (Vaseline Oint)  1 pkt TOP DAILY [facility]


   Last Admin: 04/01/18 12:50 Dose:  1 pkt


Enoxaparin Sodium (Lovenox)  80 mg SC Q12 [facility]


   PRN Reason: Protocol


   Last Admin: 04/01/18 22:36 Dose:  80 mg


Ferrous Sulfate (Feosol)  325 mg PO DAILY [facility]


   Last Admin: 04/01/18 09:04 Dose:  325 mg


Fluconazole (Diflucan)  200 mg PO DAILY [facility]


   PRN Reason: Protocol


   Last Admin: 04/01/18 09:04 Dose:  200 mg


Furosemide (Lasix)  40 mg PO DAILY [facility]


   Last Admin: 04/01/18 09:03 Dose:  40 mg


Gabapentin (Neurontin)  400 mg PO HS [facility]


   Last Admin: 04/01/18 22:38 Dose:  400 mg


Glucagon (Glucagen Diagnostic Kit)  0 mg IM STAT PRN; Protocol


   PRN Reason: Hypoglycemia Protocol


Vancomycin HCl 1 gm/ Sodium (Chloride)  250 mls @ 166.667 mls/hr IVPB DAILY [facility]


   PRN Reason: Protocol


   Last Admin: 04/01/18 09:01 Dose:  166.667 mls/hr


Insulin Detemir (Levemir)  18 units SC HS [facility]


   Last Admin: 04/01/18 22:39 Dose:  18 units


Insulin Human Lispro (Humalog)  4 units SC AC [facility]


   Last Admin: 04/01/18 17:10 Dose:  4 units


Losartan Potassium (Cozaar)  25 mg PO DAILY [facility]


   Last Admin: 04/01/18 09:02 Dose:  25 mg


Naproxen (Naproxen)  500 mg PO Q12 [facility]


   Last Admin: 04/01/18 22:38 Dose:  500 mg


Pantoprazole Sodium (Protonix Ec Tab)  40 mg PO DAILY [facility]


   Last Admin: 04/01/18 09:03 Dose:  40 mg


Potassium Chloride (K-Dur 20 Meq Er Tab)  20 meq PO DAILY [facility]


   Last Admin: 04/01/18 09:02 Dose:  20 meq


Sitagliptin Phosphate (Januvia)  100 mg PO DAILY@0800 [facility]


   Last Admin: 04/01/18 09:03 Dose:  100 mg


Temazepam (Restoril)  15 mg PO  PRN


   PRN Reason: Insomnia











- Labs


Labs: 


 





 03/31/18 06:39 





 03/31/18 06:39 





 











PT  13.5 Seconds (9.8-13.1)  H  03/30/18  08:08    


 


INR  1.2  (0.9-1.2)   03/30/18  08:08    


 


APTT  35.5 Seconds (25.6-37.1)   03/30/18  08:08    














- Head Exam


Head Exam: NORMAL INSPECTION





- Eye Exam


Eye Exam: Normal appearance





- ENT Exam


ENT Exam: Mucous Membranes Moist





- Respiratory Exam


Respiratory Exam: Clear to Ausculation Bilateral





- Cardiovascular Exam


Cardiovascular Exam: REGULAR RHYTHM





- Neurological Exam


Neurological Exam: Awake, Oriented x3





- Psychiatric Exam


Psychiatric exam: Normal Mood





Assessment and Plan


(1) CHF (congestive heart failure)


Status: Acute   





(2) NSTEMI (non-ST elevated myocardial infarction)


Status: Acute   





(3) Leukocytosis


Status: Acute   





(4) Sepsis


Status: Acute   





(5) Chronic ulcer of leg


Status: Chronic   





- Assessment and Plan (Free Text)


Plan: 





Cont meds


Cont tx


Cont PT


pain meds


 follow up with Cardiology


 scheduled for cardiac cath

## 2018-04-02 ENCOUNTER — HOSPITAL ENCOUNTER (OUTPATIENT)
Dept: HOSPITAL 31 - C.CATHLAB | Age: 68
Discharge: HOME | End: 2018-04-02
Attending: INTERNAL MEDICINE
Payer: MEDICARE

## 2018-04-02 VITALS — BODY MASS INDEX: 28.3 KG/M2

## 2018-04-02 DIAGNOSIS — I25.10: Primary | ICD-10-CM

## 2018-04-02 DIAGNOSIS — F17.210: ICD-10-CM

## 2018-04-02 DIAGNOSIS — I77.1: ICD-10-CM

## 2018-04-02 PROCEDURE — 82948 REAGENT STRIP/BLOOD GLUCOSE: CPT

## 2018-04-02 PROCEDURE — 4A023N7 MEASUREMENT OF CARDIAC SAMPLING AND PRESSURE, LEFT HEART, PERCUTANEOUS APPROACH: ICD-10-PCS

## 2018-04-02 PROCEDURE — 93452 LEFT HRT CATH W/VENTRCLGRPHY: CPT

## 2018-04-02 PROCEDURE — B205YZZ PLAIN RADIOGRAPHY OF LEFT HEART USING OTHER CONTRAST: ICD-10-PCS

## 2018-04-02 RX ADMIN — INSULIN LISPRO SCH UNITS: 100 INJECTION, SOLUTION INTRAVENOUS; SUBCUTANEOUS at 07:56

## 2018-04-02 RX ADMIN — ENOXAPARIN SODIUM SCH: 80 INJECTION SUBCUTANEOUS at 21:48

## 2018-04-02 RX ADMIN — COLLAGENASE SANTYL SCH APPLIC: 250 OINTMENT TOPICAL at 09:51

## 2018-04-02 RX ADMIN — POTASSIUM CHLORIDE SCH MEQ: 20 TABLET, EXTENDED RELEASE ORAL at 09:53

## 2018-04-02 RX ADMIN — INSULIN DETEMIR SCH UNITS: 100 INJECTION, SOLUTION SUBCUTANEOUS at 21:49

## 2018-04-02 RX ADMIN — PANTOPRAZOLE SODIUM SCH MG: 40 TABLET, DELAYED RELEASE ORAL at 09:53

## 2018-04-02 RX ADMIN — WHITE PETROLATUM SCH PKT: 1 OINTMENT TOPICAL at 09:57

## 2018-04-02 RX ADMIN — ENOXAPARIN SODIUM SCH: 80 INJECTION SUBCUTANEOUS at 09:56

## 2018-04-02 NOTE — CP.PCM.PN
Subjective





- Date & Time of Evaluation


Date of Evaluation: 04/02/18


Time of Evaluation: 18:22





- Subjective


Subjective: 





cath reveals left cxr disease 90%.  mid rca 70.  mid rpda 70.   distal lad 80. 

  plan for pci to lcxr.  





Objective





- Vital Signs/Intake and Output


Vital Signs (last 24 hours): 


 











Temp Pulse Resp BP Pulse Ox


 


 98.7 F   83   17   104/52 L  99 


 


 04/02/18 12:13  04/02/18 12:13  04/02/18 12:13  04/02/18 12:13  04/02/18 12:13











- Medications


Medications: 


 Current Medications





Acetaminophen (Tylenol 325mg Tab)  650 mg PO Q4 PRN


   PRN Reason: Fever >100.4 F


Aspirin (Aspirin Chewable)  81 mg PO DAILY [facility]


Atorvastatin Calcium (Lipitor)  40 mg PO DAILY [facility]


   Last Admin: 04/02/18 09:54 Dose:  40 mg


Collagenase (Santyl)  1 applic TOP DAILY [facility]


   Last Admin: 04/02/18 09:51 Dose:  1 applic


Dextrose (Dextrose 50% Inj)  0 ml IV STAT PRN; Protocol


   PRN Reason: Hypoglycemia Protocol


Dextrose (Glutose 15)  0 gm PO ONCE PRN; Protocol


   PRN Reason: Hypoglycemia Protocol


Emollient Ointment (Vaseline Oint)  1 pkt TOP DAILY [facility]


   Last Admin: 04/02/18 09:57 Dose:  1 pkt


Enoxaparin Sodium (Lovenox)  80 mg SC Q12 [facility]


   PRN Reason: Protocol


   Last Admin: 04/02/18 09:56 Dose:  Not Given


Ferrous Sulfate (Feosol)  325 mg PO DAILY [facility]


   Last Admin: 04/02/18 09:55 Dose:  325 mg


Fluconazole (Diflucan)  200 mg PO DAILY [facility]


   PRN Reason: Protocol


   Last Admin: 04/02/18 09:52 Dose:  200 mg


Furosemide (Lasix)  40 mg PO DAILY [facility]


   Last Admin: 04/02/18 09:54 Dose:  40 mg


Gabapentin (Neurontin)  400 mg PO HS [facility]


   Last Admin: 04/01/18 22:38 Dose:  400 mg


Glucagon (Glucagen Diagnostic Kit)  0 mg IM STAT PRN; Protocol


   PRN Reason: Hypoglycemia Protocol


Vancomycin HCl 1 gm/ Sodium (Chloride)  250 mls @ 166.667 mls/hr IVPB DAILY [facility]


   PRN Reason: Protocol


   Last Admin: 04/02/18 09:50 Dose:  166.667 mls/hr


Insulin Detemir (Levemir)  22 units SC HS [facility]


Insulin Human Lispro (Humalog)  4 units SC AC [facility]


   Last Admin: 04/02/18 07:56 Dose:  4 units


Losartan Potassium (Cozaar)  25 mg PO DAILY [facility]


   Last Admin: 04/02/18 09:54 Dose:  25 mg


Naproxen (Naproxen)  500 mg PO Q12 [facility]


   Last Admin: 04/02/18 09:55 Dose:  Not Given


Pantoprazole Sodium (Protonix Ec Tab)  40 mg PO DAILY [facility]


   Last Admin: 04/02/18 09:53 Dose:  40 mg


Potassium Chloride (K-Dur 20 Meq Er Tab)  20 meq PO DAILY [facility]


   Last Admin: 04/02/18 09:53 Dose:  20 meq


Sitagliptin Phosphate (Januvia)  100 mg PO DAILY@0800 [facility]


   Last Admin: 04/02/18 09:53 Dose:  100 mg


Temazepam (Restoril)  15 mg PO HS PRN


   PRN Reason: Insomnia











- Labs


Labs: 


 





 03/31/18 06:39 





 03/31/18 06:39 





 











PT  13.5 Seconds (9.8-13.1)  H  03/30/18  08:08    


 


INR  1.2  (0.9-1.2)   03/30/18  08:08    


 


APTT  35.5 Seconds (25.6-37.1)   03/30/18  08:08

## 2018-04-02 NOTE — CP.PCM.PN
Subjective





- Date & Time of Evaluation


Date of Evaluation: 04/02/18


Time of Evaluation: 08:00





- Subjective


Subjective: 





events noted


developed NSTEMI/ CHF


left foot wound same


cont Vanco


awaiting cardiac cath


cont IV antibiotics


MRI foot to be done when stable





Objective





- Vital Signs/Intake and Output


Vital Signs (last 24 hours): 


 











Temp Pulse Resp BP Pulse Ox


 


 98.7 F   83   17   104/52 L  99 


 


 04/02/18 12:13  04/02/18 12:13  04/02/18 12:13  04/02/18 12:13  04/02/18 12:13











- Medications


Medications: 


 Current Medications





Acetaminophen (Tylenol 325mg Tab)  650 mg PO Q4 PRN


   PRN Reason: Fever >100.4 F


Atorvastatin Calcium (Lipitor)  40 mg PO DAILY [facility]


   Last Admin: 04/02/18 09:54 Dose:  40 mg


Collagenase (Santyl)  1 applic TOP DAILY [facility]


   Last Admin: 04/02/18 09:51 Dose:  1 applic


Dextrose (Dextrose 50% Inj)  0 ml IV STAT PRN; Protocol


   PRN Reason: Hypoglycemia Protocol


Dextrose (Glutose 15)  0 gm PO ONCE PRN; Protocol


   PRN Reason: Hypoglycemia Protocol


Emollient Ointment (Vaseline Oint)  1 pkt TOP DAILY [facility]


   Last Admin: 04/02/18 09:57 Dose:  1 pkt


Enoxaparin Sodium (Lovenox)  80 mg SC Q12 [facility]


   PRN Reason: Protocol


   Last Admin: 04/02/18 09:56 Dose:  Not Given


Ferrous Sulfate (Feosol)  325 mg PO DAILY [facility]


   Last Admin: 04/02/18 09:55 Dose:  325 mg


Fluconazole (Diflucan)  200 mg PO DAILY [facility]


   PRN Reason: Protocol


   Last Admin: 04/02/18 09:52 Dose:  200 mg


Furosemide (Lasix)  40 mg PO DAILY [facility]


   Last Admin: 04/02/18 09:54 Dose:  40 mg


Gabapentin (Neurontin)  400 mg PO HS [facility]


   Last Admin: 04/01/18 22:38 Dose:  400 mg


Glucagon (Glucagen Diagnostic Kit)  0 mg IM STAT PRN; Protocol


   PRN Reason: Hypoglycemia Protocol


Vancomycin HCl 1 gm/ Sodium (Chloride)  250 mls @ 166.667 mls/hr IVPB DAILY [facility]


   PRN Reason: Protocol


   Last Admin: 04/02/18 09:50 Dose:  166.667 mls/hr


Insulin Detemir (Levemir)  18 units SC HS [facility]


   Last Admin: 04/01/18 22:39 Dose:  18 units


Insulin Human Lispro (Humalog)  4 units SC AC [facility]


   Last Admin: 04/02/18 07:56 Dose:  4 units


Losartan Potassium (Cozaar)  25 mg PO DAILY [facility]


   Last Admin: 04/02/18 09:54 Dose:  25 mg


Naproxen (Naproxen)  500 mg PO Q12 [facility]


   Last Admin: 04/02/18 09:55 Dose:  Not Given


Pantoprazole Sodium (Protonix Ec Tab)  40 mg PO DAILY [facility]


   Last Admin: 04/02/18 09:53 Dose:  40 mg


Potassium Chloride (K-Dur 20 Meq Er Tab)  20 meq PO DAILY [facility]


   Last Admin: 04/02/18 09:53 Dose:  20 meq


Sitagliptin Phosphate (Januvia)  100 mg PO DAILY@0800 [facility]


   Last Admin: 04/02/18 09:53 Dose:  100 mg


Temazepam (Restoril)  15 mg PO  PRN


   PRN Reason: Insomnia











- Labs


Labs: 


 





 03/31/18 06:39 





 03/31/18 06:39 





 











PT  13.5 Seconds (9.8-13.1)  H  03/30/18  08:08    


 


INR  1.2  (0.9-1.2)   03/30/18  08:08    


 


APTT  35.5 Seconds (25.6-37.1)   03/30/18  08:08    














- Constitutional


Appears: Non-toxic, Chronically Ill





- Head Exam


Head Exam: NORMOCEPHALIC





- ENT Exam


ENT Exam: Mucous Membranes Dry





- Neck Exam


Neck Exam: absent: Lymphadenopathy





- Respiratory Exam


Respiratory Exam: Decreased Breath Sounds





- Cardiovascular Exam


Cardiovascular Exam: REGULAR RHYTHM





- GI/Abdominal Exam


GI & Abdominal Exam: Distended, Soft





- Rectal Exam


Rectal Exam: Deferred





-  Exam


 Exam: NORMAL INSPECTION





- Extremities Exam


Extremities Exam: absent: Pedal Edema





- Back Exam


Back Exam: absent: CVA tenderness (L), CVA tenderness (R)





- Neurological Exam


Neurological Exam: Alert, Awake, Oriented x3





Assessment and Plan


(1) CHF (congestive heart failure)


Status: Acute   





(2) Diabetes mellitus type 2 in nonobese


Status: Acute   





(3) Sepsis


Status: Acute   





- Assessment and Plan (Free Text)


Assessment: 





events noted


developed NSTEMI/ CHF


left foot wound same


cont Vanco


awaiting cardiac cath


cont IV antibiotics


MRI foot to be done when stable

## 2018-04-02 NOTE — CP.PCM.PN
Subjective





- Date & Time of Evaluation


Date of Evaluation: 04/02/18


Time of Evaluation: 14:33





- Subjective


Subjective: 





Patient remains stable


 Has no chest pain or SOB


 Afebrile


 accuchecks in the 150's





Objective





- Vital Signs/Intake and Output


Vital Signs (last 24 hours): 


 











Temp Pulse Resp BP Pulse Ox


 


 98.7 F   83   17   104/52 L  99 


 


 04/02/18 12:13  04/02/18 12:13  04/02/18 12:13  04/02/18 12:13  04/02/18 12:13











- Medications


Medications: 


 Current Medications





Acetaminophen (Tylenol 325mg Tab)  650 mg PO Q4 PRN


   PRN Reason: Fever >100.4 F


Atorvastatin Calcium (Lipitor)  40 mg PO DAILY [facility]


   Last Admin: 04/02/18 09:54 Dose:  40 mg


Collagenase (Santyl)  1 applic TOP DAILY [facility]


   Last Admin: 04/02/18 09:51 Dose:  1 applic


Dextrose (Dextrose 50% Inj)  0 ml IV STAT PRN; Protocol


   PRN Reason: Hypoglycemia Protocol


Dextrose (Glutose 15)  0 gm PO ONCE PRN; Protocol


   PRN Reason: Hypoglycemia Protocol


Emollient Ointment (Vaseline Oint)  1 pkt TOP DAILY [facility]


   Last Admin: 04/02/18 09:57 Dose:  1 pkt


Enoxaparin Sodium (Lovenox)  80 mg SC Q12 [facility]


   PRN Reason: Protocol


   Last Admin: 04/02/18 09:56 Dose:  Not Given


Ferrous Sulfate (Feosol)  325 mg PO DAILY [facility]


   Last Admin: 04/02/18 09:55 Dose:  325 mg


Fluconazole (Diflucan)  200 mg PO DAILY [facility]


   PRN Reason: Protocol


   Last Admin: 04/02/18 09:52 Dose:  200 mg


Furosemide (Lasix)  40 mg PO DAILY [facility]


   Last Admin: 04/02/18 09:54 Dose:  40 mg


Gabapentin (Neurontin)  400 mg PO HS [facility]


   Last Admin: 04/01/18 22:38 Dose:  400 mg


Glucagon (Glucagen Diagnostic Kit)  0 mg IM STAT PRN; Protocol


   PRN Reason: Hypoglycemia Protocol


Vancomycin HCl 1 gm/ Sodium (Chloride)  250 mls @ 166.667 mls/hr IVPB DAILY [facility]


   PRN Reason: Protocol


   Last Admin: 04/02/18 09:50 Dose:  166.667 mls/hr


Insulin Detemir (Levemir)  18 units SC [facility]


   Last Admin: 04/01/18 22:39 Dose:  18 units


Insulin Human Lispro (Humalog)  4 units SC AC [facility]


   Last Admin: 04/02/18 07:56 Dose:  4 units


Losartan Potassium (Cozaar)  25 mg PO DAILY [facility]


   Last Admin: 04/02/18 09:54 Dose:  25 mg


Naproxen (Naproxen)  500 mg PO Q12 [facility]


   Last Admin: 04/02/18 09:55 Dose:  Not Given


Pantoprazole Sodium (Protonix Ec Tab)  40 mg PO DAILY [facility]


   Last Admin: 04/02/18 09:53 Dose:  40 mg


Potassium Chloride (K-Dur 20 Meq Er Tab)  20 meq PO DAILY [facility]


   Last Admin: 04/02/18 09:53 Dose:  20 meq


Sitagliptin Phosphate (Januvia)  100 mg PO DAILY@0800 [facility]


   Last Admin: 04/02/18 09:53 Dose:  100 mg


Temazepam (Restoril)  15 mg PO HS PRN


   PRN Reason: Insomnia











- Labs


Labs: 


 





 03/31/18 06:39 





 03/31/18 06:39 





 











PT  13.5 Seconds (9.8-13.1)  H  03/30/18  08:08    


 


INR  1.2  (0.9-1.2)   03/30/18  08:08    


 


APTT  35.5 Seconds (25.6-37.1)   03/30/18  08:08    














- Head Exam


Head Exam: NORMAL INSPECTION





- Eye Exam


Eye Exam: Normal appearance





- ENT Exam


ENT Exam: Mucous Membranes Moist





- Respiratory Exam


Respiratory Exam: Clear to Ausculation Bilateral





- Cardiovascular Exam


Cardiovascular Exam: REGULAR RHYTHM





- GI/Abdominal Exam


GI & Abdominal Exam: Normal Bowel Sounds





- Neurological Exam


Neurological Exam: Awake, Oriented x3





- Skin


Skin Exam: Dry





Assessment and Plan


(1) CHF (congestive heart failure)


Status: Acute   





(2) NSTEMI (non-ST elevated myocardial infarction)


Status: Acute   





(3) Diabetes mellitus type 2 in nonobese


Status: Acute   





(4) Leukocytosis


Status: Acute   





(5) Sepsis


Status: Acute   





(6) Chronic ulcer of leg


Status: Chronic   





- Assessment and Plan (Free Text)


Plan: 





Cont meds


Con ttx


  for cardiac acth


 adjust insulin dose

## 2018-04-02 NOTE — CP.PCM.PN
Subjective





- Date & Time of Evaluation


Date of Evaluation: 04/02/18


Time of Evaluation: 08:16





- Subjective


Subjective: 


67 year male with PMHx of DM and recently diagnosed CHF seen and evaluated at 

bedside for continued treatment of left foot ulceration. Patient AAO x 3 and NAD

, resting comfortably in bed at time of examination. Denies any acute overnight 

event or new pedal complaints. States that pain is well controlled at this 

time. Denies any recent N/V/F/C/CP/SOB/D/posterior calf pain when squeezed. 





Objective





- Vital Signs/Intake and Output


Vital Signs (last 24 hours): 


 











Temp Pulse Resp BP Pulse Ox


 


 98.6 F   73   18   143/83   97 


 


 04/01/18 08:00  04/01/18 09:02  04/01/18 08:00  04/01/18 09:03  04/01/18 08:00











- Medications


Medications: 


 Current Medications





Acetaminophen (Tylenol 325mg Tab)  650 mg PO Q4 PRN


   PRN Reason: Fever >100.4 F


Atorvastatin Calcium (Lipitor)  40 mg PO DAILY [facility]


   Last Admin: 04/01/18 09:01 Dose:  40 mg


Collagenase (Santyl)  1 applic TOP DAILY [facility]


   Last Admin: 03/31/18 16:49 Dose:  1 applic


Dextrose (Dextrose 50% Inj)  0 ml IV STAT PRN; Protocol


   PRN Reason: Hypoglycemia Protocol


Dextrose (Glutose 15)  0 gm PO ONCE PRN; Protocol


   PRN Reason: Hypoglycemia Protocol


Enoxaparin Sodium (Lovenox)  80 mg SC Q12 [facility]


   PRN Reason: Protocol


   Last Admin: 04/01/18 09:01 Dose:  80 mg


Ferrous Sulfate (Feosol)  325 mg PO DAILY [facility]


   Last Admin: 04/01/18 09:04 Dose:  325 mg


Fluconazole (Diflucan)  200 mg PO DAILY [facility]


   PRN Reason: Protocol


   Last Admin: 04/01/18 09:04 Dose:  200 mg


Furosemide (Lasix)  40 mg PO DAILY [facility]


   Last Admin: 04/01/18 09:03 Dose:  40 mg


Gabapentin (Neurontin)  400 mg PO HS [facility]


   Last Admin: 03/31/18 23:11 Dose:  400 mg


Glucagon (Glucagen Diagnostic Kit)  0 mg IM STAT PRN; Protocol


   PRN Reason: Hypoglycemia Protocol


Vancomycin HCl 1 gm/ Sodium (Chloride)  250 mls @ 166.667 mls/hr IVPB DAILY [facility]


   PRN Reason: Protocol


   Last Admin: 04/01/18 09:01 Dose:  166.667 mls/hr


Insulin Detemir (Levemir)  18 units SC HS [facility]


   Last Admin: 03/31/18 23:11 Dose:  18 units


Insulin Human Lispro (Humalog)  4 units SC AC [facility]


   Last Admin: 04/01/18 09:06 Dose:  4 units


Losartan Potassium (Cozaar)  25 mg PO DAILY [facility]


   Last Admin: 04/01/18 09:02 Dose:  25 mg


Naproxen (Naproxen)  500 mg PO Q12 [facility]


   Last Admin: 04/01/18 09:04 Dose:  500 mg


Pantoprazole Sodium (Protonix Ec Tab)  40 mg PO DAILY [facility]


   Last Admin: 04/01/18 09:03 Dose:  40 mg


Potassium Chloride (K-Dur 20 Meq Er Tab)  20 meq PO DAILY [facility]


   Last Admin: 04/01/18 09:02 Dose:  20 meq


Sitagliptin Phosphate (Januvia)  100 mg PO DAILY@0800 [facility]


   Last Admin: 04/01/18 09:03 Dose:  100 mg


Temazepam (Restoril)  15 mg PO  PRN


   PRN Reason: Insomnia











- Labs


Labs: 


 





 03/31/18 06:39 





 03/31/18 06:39 





 











PT  13.5 Seconds (9.8-13.1)  H  03/30/18  08:08    


 


INR  1.2  (0.9-1.2)   03/30/18  08:08    


 


APTT  35.5 Seconds (25.6-37.1)   03/30/18  08:08    














- Constitutional


Appears: Well, Non-toxic, No Acute Distress





- Extremities Exam


Additional comments: 





VASC: LLE DP and PT pulses nonpalpable. RLE DP pulse weakly palpable 1/4, PT 

nonpalpable. CFT <3 seconds to all digits x10. Digital hair is present. Mild 

nonpitting edmea noted to left foot, most prominent on the plantar aspect of 

the medial arch, temperature to the LE is warm to warm bilaterally





NEURO: Protective and gross sensation diminished





DERM: Fibro-necrotic eschar located on the entire lateral aspect of dorsum foot 

is now becoming more fibrotic (70%) with periwound skin erosion and detachment 

noted. Mild fluctanance appreciated with palpation lateral of the ulceration. 

Majority of the wound most likely to level of skin, with portion of ulceration 

likely down to level of tendon. Mild periwound erythema surrounding entire 

eschar, no streaking appreciated, no malodor, no purulent drainage or drainage 

expressed. Less maceration noted to entire dorsolateral ulceration compared to 

yesterday, no drainage expressed, no fluctance noted





ORTHO: mild pain with palpation to the left foot, improving





- Neurological Exam


Neurological Exam: Alert, Awake, Oriented x3





- Psychiatric Exam


Psychiatric exam: Normal Affect, Normal Mood





Assessment and Plan





- Assessment and Plan (Free Text)


Assessment: 





67 year male with PMHx of DM and recently diagnosed CHF seen and evaluated at 

bedside for continued treatment of left foot ulceration


Plan: 





Patient seen and evaluated at bedside


Plan discussed with attending, Dr. Ascencio


Chart, labs and vitals reviewed


Afebrile, NNL 


Wound cx (+) for growth of Candida Parasilosis


Continue IV abx per ID


Continue pain management per medicine


Foot MRI 3/30 shows abscess of left foot


No plan for surgical intervention at this time


Patient for cardiac cath at [facility] with Dr. Castellanos today


Requesting cardiac clearance at this time


Plan for possible bedside vs OR I&D of abscess later today or tomorrow


Podiatry will continue to follow while patient in house





Objective





- Vital Signs/Intake and Output


Vital Signs (last 24 hours): 


 











Temp Pulse Resp BP Pulse Ox


 


 98.0 F   77   18   107/58 L  95 


 


 04/02/18 05:57  04/02/18 05:57  04/02/18 05:57  04/02/18 05:57  04/02/18 05:57











- Medications


Medications: 


 Current Medications





Acetaminophen (Tylenol 325mg Tab)  650 mg PO Q4 PRN


   PRN Reason: Fever >100.4 F


Atorvastatin Calcium (Lipitor)  40 mg PO DAILY [facility]


   Last Admin: 04/01/18 09:01 Dose:  40 mg


Collagenase (Santyl)  1 applic TOP DAILY [facility]


   Last Admin: 04/01/18 11:25 Dose:  Not Given


Dextrose (Dextrose 50% Inj)  0 ml IV STAT PRN; Protocol


   PRN Reason: Hypoglycemia Protocol


Dextrose (Glutose 15)  0 gm PO ONCE PRN; Protocol


   PRN Reason: Hypoglycemia Protocol


Emollient Ointment (Vaseline Oint)  1 pkt TOP DAILY [facility]


   Last Admin: 04/01/18 12:50 Dose:  1 pkt


Enoxaparin Sodium (Lovenox)  80 mg SC Q12 [facility]


   PRN Reason: Protocol


   Last Admin: 04/01/18 22:36 Dose:  80 mg


Ferrous Sulfate (Feosol)  325 mg PO DAILY [facility]


   Last Admin: 04/01/18 09:04 Dose:  325 mg


Fluconazole (Diflucan)  200 mg PO DAILY [facility]


   PRN Reason: Protocol


   Last Admin: 04/01/18 09:04 Dose:  200 mg


Furosemide (Lasix)  40 mg PO DAILY [facility]


   Last Admin: 04/01/18 09:03 Dose:  40 mg


Gabapentin (Neurontin)  400 mg PO HS [facility]


   Last Admin: 04/01/18 22:38 Dose:  400 mg


Glucagon (Glucagen Diagnostic Kit)  0 mg IM STAT PRN; Protocol


   PRN Reason: Hypoglycemia Protocol


Vancomycin HCl 1 gm/ Sodium (Chloride)  250 mls @ 166.667 mls/hr IVPB DAILY [facility]


   PRN Reason: Protocol


   Last Admin: 04/01/18 09:01 Dose:  166.667 mls/hr


Insulin Detemir (Levemir)  18 units SC HS [facility]


   Last Admin: 04/01/18 22:39 Dose:  18 units


Insulin Human Lispro (Humalog)  4 units SC AC [facility]


   Last Admin: 04/01/18 17:10 Dose:  4 units


Losartan Potassium (Cozaar)  25 mg PO DAILY [facility]


   Last Admin: 04/01/18 09:02 Dose:  25 mg


Naproxen (Naproxen)  500 mg PO Q12 [facility]


   Last Admin: 04/01/18 22:38 Dose:  500 mg


Pantoprazole Sodium (Protonix Ec Tab)  40 mg PO DAILY [facility]


   Last Admin: 04/01/18 09:03 Dose:  40 mg


Potassium Chloride (K-Dur 20 Meq Er Tab)  20 meq PO DAILY [facility]


   Last Admin: 04/01/18 09:02 Dose:  20 meq


Sitagliptin Phosphate (Januvia)  100 mg PO DAILY@0800 [facility]


   Last Admin: 04/01/18 09:03 Dose:  100 mg


Temazepam (Restoril)  15 mg PO HS PRN


   PRN Reason: Insomnia











- Labs


Labs: 


 





 03/31/18 06:39 





 03/31/18 06:39 





 











PT  13.5 Seconds (9.8-13.1)  H  03/30/18  08:08    


 


INR  1.2  (0.9-1.2)   03/30/18  08:08    


 


APTT  35.5 Seconds (25.6-37.1)   03/30/18  08:08

## 2018-04-03 LAB
ALBUMIN SERPL-MCNC: 2.9 G/DL (ref 3.5–5)
ALBUMIN/GLOB SERPL: 0.7 {RATIO} (ref 1–2.1)
ALT SERPL-CCNC: 37 U/L (ref 21–72)
AST SERPL-CCNC: 35 U/L (ref 17–59)
BASOPHILS # BLD AUTO: 0.1 K/UL (ref 0–0.2)
BASOPHILS NFR BLD: 1.1 % (ref 0–2)
BUN SERPL-MCNC: 15 MG/DL (ref 9–20)
CALCIUM SERPL-MCNC: 8.7 MG/DL (ref 8.4–10.2)
EOSINOPHIL # BLD AUTO: 1.6 K/UL (ref 0–0.7)
EOSINOPHIL NFR BLD: 17.6 % (ref 0–4)
ERYTHROCYTE [DISTWIDTH] IN BLOOD BY AUTOMATED COUNT: 17 % (ref 11.5–14.5)
GFR NON-AFRICAN AMERICAN: > 60
HGB BLD-MCNC: 9.1 G/DL (ref 12–18)
LYMPHOCYTES # BLD AUTO: 1.7 K/UL (ref 1–4.3)
LYMPHOCYTES NFR BLD AUTO: 17.9 % (ref 20–40)
MCH RBC QN AUTO: 27 PG (ref 27–31)
MCHC RBC AUTO-ENTMCNC: 32.6 G/DL (ref 33–37)
MCV RBC AUTO: 82.7 FL (ref 80–94)
MONOCYTES # BLD: 0.8 K/UL (ref 0–0.8)
MONOCYTES NFR BLD: 8.7 % (ref 0–10)
NEUTROPHILS # BLD: 5.1 K/UL (ref 1.8–7)
NEUTROPHILS NFR BLD AUTO: 54.7 % (ref 50–75)
NRBC BLD AUTO-RTO: 0.1 % (ref 0–0)
PLATELET # BLD: 523 K/UL (ref 130–400)
PMV BLD AUTO: 11.1 FL (ref 7.2–11.7)
RBC # BLD AUTO: 3.39 MIL/UL (ref 4.4–5.9)
WBC # BLD AUTO: 9.3 K/UL (ref 4.8–10.8)

## 2018-04-03 PROCEDURE — 0H9NXZZ DRAINAGE OF LEFT FOOT SKIN, EXTERNAL APPROACH: ICD-10-PCS

## 2018-04-03 RX ADMIN — ENOXAPARIN SODIUM SCH: 80 INJECTION SUBCUTANEOUS at 22:42

## 2018-04-03 RX ADMIN — WHITE PETROLATUM SCH: 1 OINTMENT TOPICAL at 22:30

## 2018-04-03 RX ADMIN — INSULIN DETEMIR SCH UNITS: 100 INJECTION, SOLUTION SUBCUTANEOUS at 22:54

## 2018-04-03 NOTE — CP.PCM.PN
Subjective





- Date & Time of Evaluation


Date of Evaluation: 04/03/18


Time of Evaluation: 18:00





- Subjective


Subjective: 





successful stent left circumflex.





Plan Brilinta 90 mg BID


ASA 81 mg daily.


Stable for d/c from 4N in am





Objective





- Vital Signs/Intake and Output


Vital Signs (last 24 hours): 


 











Temp Pulse Resp BP Pulse Ox


 


 98 F   77   20   104/43 L  97 


 


 04/03/18 08:00  04/03/18 08:00  04/03/18 08:00  04/03/18 08:00  04/03/18 08:00











- Medications


Medications: 


 Current Medications





Acetaminophen (Tylenol 325mg Tab)  650 mg PO Q4 PRN


   PRN Reason: Fever >100.4 F


Aspirin (Aspirin Chewable)  81 mg PO DAILY [facility]


Atorvastatin Calcium (Lipitor)  40 mg PO DAILY [facility]


   Last Admin: 04/02/18 09:54 Dose:  40 mg


Collagenase (Santyl)  1 applic TOP DAILY [facility]


   Last Admin: 04/02/18 09:51 Dose:  1 applic


Dextrose (Dextrose 50% Inj)  0 ml IV STAT PRN; Protocol


   PRN Reason: Hypoglycemia Protocol


Dextrose (Glutose 15)  0 gm PO ONCE PRN; Protocol


   PRN Reason: Hypoglycemia Protocol


Emollient Ointment (Vaseline Oint)  1 pkt TOP DAILY [facility]


   Last Admin: 04/02/18 09:57 Dose:  1 pkt


Enoxaparin Sodium (Lovenox)  80 mg SC Q12 [facility]


   PRN Reason: Protocol


   Last Admin: 04/02/18 21:48 Dose:  Not Given


Ferrous Sulfate (Feosol)  325 mg PO DAILY [facility]


   Last Admin: 04/02/18 09:55 Dose:  325 mg


Fluconazole (Diflucan)  200 mg PO DAILY [facility]


   PRN Reason: Protocol


   Last Admin: 04/02/18 09:52 Dose:  200 mg


Furosemide (Lasix)  40 mg PO DAILY [facility]


   Last Admin: 04/02/18 09:54 Dose:  40 mg


Gabapentin (Neurontin)  400 mg PO HS [facility]


   Last Admin: 04/02/18 21:49 Dose:  400 mg


Glucagon (Glucagen Diagnostic Kit)  0 mg IM STAT PRN; Protocol


   PRN Reason: Hypoglycemia Protocol


Vancomycin HCl 1 gm/ Sodium (Chloride)  250 mls @ 166.667 mls/hr IVPB DAILY [facility]


   PRN Reason: Protocol


   Last Admin: 04/02/18 09:50 Dose:  166.667 mls/hr


Insulin Detemir (Levemir)  22 units SC HS [facility]


   Last Admin: 04/02/18 21:49 Dose:  22 units


Insulin Human Lispro (Humalog)  4 units SC AC [facility]


   Last Admin: 04/02/18 07:56 Dose:  4 units


Losartan Potassium (Cozaar)  25 mg PO DAILY [facility]


   Last Admin: 04/02/18 09:54 Dose:  25 mg


Naproxen (Naproxen)  500 mg PO Q12 [facility]


   Last Admin: 04/02/18 21:48 Dose:  500 mg


Pantoprazole Sodium (Protonix Ec Tab)  40 mg PO DAILY [facility]


   Last Admin: 04/02/18 09:53 Dose:  40 mg


Potassium Chloride (K-Dur 20 Meq Er Tab)  20 meq PO DAILY [facility]


   Last Admin: 04/02/18 09:53 Dose:  20 meq


Sitagliptin Phosphate (Januvia)  100 mg PO DAILY@0800 [facility]


   Last Admin: 04/02/18 09:53 Dose:  100 mg


Temazepam (Restoril)  15 mg PO HS PRN


   PRN Reason: Insomnia











- Labs


Labs: 


 





 04/03/18 04:20 





 04/03/18 04:20 





 











PT  13.5 Seconds (9.8-13.1)  H  03/30/18  08:08    


 


INR  1.2  (0.9-1.2)   03/30/18  08:08    


 


APTT  35.5 Seconds (25.6-37.1)   03/30/18  08:08    














Assessment and Plan


(1) NSTEMI (non-ST elevated myocardial infarction)


Status: Acute

## 2018-04-03 NOTE — CP.PCM.PN
Subjective





- Date & Time of Evaluation


Date of Evaluation: 04/03/18


Time of Evaluation: 08:25





- Subjective


Subjective: 


67 year male seen and evaluated at bedside for continued treatment of left 

dorsolateral foot ulceration. Patient AAO x 3 and NAD, resting comfortably in 

bed at time of examination. Denies any acute overnight event or new pedal 

complaints. Denies having any pain in the foot. Says he had a cardiac catheter 

procedure yesterday and is going for another procedure today. Denies any recent 

N/V/F/C/CP/SOB/D/posterior calf pain when squeezed. 











Objective





- Vital Signs/Intake and Output


Vital Signs (last 24 hours): 


 











Temp Pulse Resp BP Pulse Ox


 


 98 F   77   20   104/43 L  97 


 


 04/03/18 08:00  04/03/18 08:00  04/03/18 08:00  04/03/18 08:00  04/03/18 08:00











- Medications


Medications: 


 Current Medications





Acetaminophen (Tylenol 325mg Tab)  650 mg PO Q4 PRN


   PRN Reason: Fever >100.4 F


Aspirin (Aspirin Chewable)  81 mg PO DAILY [facility]


Atorvastatin Calcium (Lipitor)  40 mg PO DAILY [facility]


   Last Admin: 04/02/18 09:54 Dose:  40 mg


Collagenase (Santyl)  1 applic TOP DAILY [facility]


   Last Admin: 04/02/18 09:51 Dose:  1 applic


Dextrose (Dextrose 50% Inj)  0 ml IV STAT PRN; Protocol


   PRN Reason: Hypoglycemia Protocol


Dextrose (Glutose 15)  0 gm PO ONCE PRN; Protocol


   PRN Reason: Hypoglycemia Protocol


Emollient Ointment (Vaseline Oint)  1 pkt TOP DAILY [facility]


   Last Admin: 04/02/18 09:57 Dose:  1 pkt


Enoxaparin Sodium (Lovenox)  80 mg SC Q12 [facility]


   PRN Reason: Protocol


   Last Admin: 04/02/18 21:48 Dose:  Not Given


Ferrous Sulfate (Feosol)  325 mg PO DAILY [facility]


   Last Admin: 04/02/18 09:55 Dose:  325 mg


Fluconazole (Diflucan)  200 mg PO DAILY [facility]


   PRN Reason: Protocol


   Last Admin: 04/02/18 09:52 Dose:  200 mg


Furosemide (Lasix)  40 mg PO DAILY [facility]


   Last Admin: 04/02/18 09:54 Dose:  40 mg


Gabapentin (Neurontin)  400 mg PO HS [facility]


   Last Admin: 04/02/18 21:49 Dose:  400 mg


Glucagon (Glucagen Diagnostic Kit)  0 mg IM STAT PRN; Protocol


   PRN Reason: Hypoglycemia Protocol


Vancomycin HCl 1 gm/ Sodium (Chloride)  250 mls @ 166.667 mls/hr IVPB DAILY [facility]


   PRN Reason: Protocol


   Last Admin: 04/02/18 09:50 Dose:  166.667 mls/hr


Insulin Detemir (Levemir)  22 units SC HS [facility]


   Last Admin: 04/02/18 21:49 Dose:  22 units


Insulin Human Lispro (Humalog)  4 units SC AC [facility]


   Last Admin: 04/02/18 07:56 Dose:  4 units


Losartan Potassium (Cozaar)  25 mg PO DAILY [facility]


   Last Admin: 04/02/18 09:54 Dose:  25 mg


Naproxen (Naproxen)  500 mg PO Q12 [facility]


   Last Admin: 04/02/18 21:48 Dose:  500 mg


Pantoprazole Sodium (Protonix Ec Tab)  40 mg PO DAILY [facility]


   Last Admin: 04/02/18 09:53 Dose:  40 mg


Potassium Chloride (K-Dur 20 Meq Er Tab)  20 meq PO DAILY [facility]


   Last Admin: 04/02/18 09:53 Dose:  20 meq


Sitagliptin Phosphate (Januvia)  100 mg PO DAILY@0800 [facility]


   Last Admin: 04/02/18 09:53 Dose:  100 mg


Temazepam (Restoril)  15 mg PO HS PRN


   PRN Reason: Insomnia











- Labs


Labs: 


 





 04/03/18 04:20 





 04/03/18 04:20 





 











PT  13.5 Seconds (9.8-13.1)  H  03/30/18  08:08    


 


INR  1.2  (0.9-1.2)   03/30/18  08:08    


 


APTT  35.5 Seconds (25.6-37.1)   03/30/18  08:08    














- Constitutional


Appears: Well, Non-toxic, No Acute Distress





- Extremities Exam


Additional comments: 


VASC: LLE DP and PT pulses nonpalpable. RLE DP pulse weakly palpable 1/4, PT 

nonpalpable. CFT <3 seconds to all digits x10. Digital hair is present. Mild 

nonpitting edmea noted to left foot, most prominent on the plantar aspect of 

the medial arch, temperature to the LE is warm to warm B/L


NEURO: Protective and gross sensation diminished


DERM: Fibro-necrotic eschar located on the entire lateral aspect of dorsum foot 

is now becoming more fibrotic (70%) with periwound skin erosion and detachment 

noted. Mild fluctuance appreciated with palpation of dorsolateral aspect. Mild 

periwound erythema surrounding entire eschar, no streaking appreciated. Post 

debridement, 3-4cc purulence expressed with application of pressure


ORTHO: no tenderness on palpation or debridement of left foot ulceration




















- Neurological Exam


Neurological Exam: Alert, Awake, Oriented x3





- Psychiatric Exam


Psychiatric exam: Normal Affect, Normal Mood





Assessment and Plan





- Assessment and Plan (Free Text)


Assessment: 


67 year male with left foot ulceration with underlying abscess





Plan: 





Patient seen and evaluated at bedside


Plan discussed with attending, Dr. Ascencio


Chart, labs and vitals reviewed


Afebrile, NNL 


Wound cx (+) for growth of Candida Parasilosis


Continue IV abx per ID


Continue pain management per medicine


Foot MRI 3/30 shows abscess of left foot


Sterile #15 blade used to create 1cm opening to dorsolateral ulceration to 

express drainage


Patient for further cardiac intervention today at [facility]


Requesting cardiac clearance at this time for possible OR for I&D of left foot


Podiatry will continue to follow while patient in house

## 2018-04-04 ENCOUNTER — HOSPITAL ENCOUNTER (INPATIENT)
Dept: HOSPITAL 14 - H.TCU | Age: 68
LOS: 9 days | Discharge: HOME HEALTH | DRG: 637 | End: 2018-04-13
Attending: FAMILY MEDICINE | Admitting: FAMILY MEDICINE
Payer: COMMERCIAL

## 2018-04-04 VITALS — OXYGEN SATURATION: 98 % | HEART RATE: 85 BPM

## 2018-04-04 VITALS — DIASTOLIC BLOOD PRESSURE: 74 MMHG | TEMPERATURE: 98.1 F | SYSTOLIC BLOOD PRESSURE: 126 MMHG

## 2018-04-04 VITALS — RESPIRATION RATE: 20 BRPM

## 2018-04-04 VITALS — BODY MASS INDEX: 23.1 KG/M2

## 2018-04-04 DIAGNOSIS — I11.0: ICD-10-CM

## 2018-04-04 DIAGNOSIS — E11.621: ICD-10-CM

## 2018-04-04 DIAGNOSIS — F31.9: ICD-10-CM

## 2018-04-04 DIAGNOSIS — Z79.4: ICD-10-CM

## 2018-04-04 DIAGNOSIS — I50.23: ICD-10-CM

## 2018-04-04 DIAGNOSIS — E11.628: Primary | ICD-10-CM

## 2018-04-04 DIAGNOSIS — L97.529: ICD-10-CM

## 2018-04-04 DIAGNOSIS — L02.612: ICD-10-CM

## 2018-04-04 PROCEDURE — 4A023N7 MEASUREMENT OF CARDIAC SAMPLING AND PRESSURE, LEFT HEART, PERCUTANEOUS APPROACH: ICD-10-PCS

## 2018-04-04 PROCEDURE — 3E03329 INTRODUCTION OF OTHER ANTI-INFECTIVE INTO PERIPHERAL VEIN, PERCUTANEOUS APPROACH: ICD-10-PCS | Performed by: FAMILY MEDICINE

## 2018-04-04 PROCEDURE — 027034Z DILATION OF CORONARY ARTERY, ONE ARTERY WITH DRUG-ELUTING INTRALUMINAL DEVICE, PERCUTANEOUS APPROACH: ICD-10-PCS

## 2018-04-04 PROCEDURE — F08Z4FZ HOME MANAGEMENT TREATMENT USING ASSISTIVE, ADAPTIVE, SUPPORTIVE OR PROTECTIVE EQUIPMENT: ICD-10-PCS | Performed by: FAMILY MEDICINE

## 2018-04-04 PROCEDURE — F07Z9FZ GAIT TRAINING/FUNCTIONAL AMBULATION TREATMENT USING ASSISTIVE, ADAPTIVE, SUPPORTIVE OR PROTECTIVE EQUIPMENT: ICD-10-PCS | Performed by: FAMILY MEDICINE

## 2018-04-04 PROCEDURE — B205YZZ PLAIN RADIOGRAPHY OF LEFT HEART USING OTHER CONTRAST: ICD-10-PCS

## 2018-04-04 RX ADMIN — POTASSIUM CHLORIDE SCH MEQ: 20 TABLET, EXTENDED RELEASE ORAL at 09:09

## 2018-04-04 RX ADMIN — INSULIN LISPRO SCH: 100 INJECTION, SOLUTION INTRAVENOUS; SUBCUTANEOUS at 16:36

## 2018-04-04 RX ADMIN — INSULIN LISPRO SCH UNITS: 100 INJECTION, SOLUTION INTRAVENOUS; SUBCUTANEOUS at 13:29

## 2018-04-04 RX ADMIN — PANTOPRAZOLE SODIUM SCH MG: 40 TABLET, DELAYED RELEASE ORAL at 10:08

## 2018-04-04 RX ADMIN — INSULIN LISPRO SCH UNITS: 100 INJECTION, SOLUTION INTRAVENOUS; SUBCUTANEOUS at 09:10

## 2018-04-04 RX ADMIN — INSULIN LISPRO SCH UNITS: 100 INJECTION, SOLUTION INTRAVENOUS; SUBCUTANEOUS at 13:28

## 2018-04-04 RX ADMIN — COLLAGENASE SANTYL SCH: 250 OINTMENT TOPICAL at 09:11

## 2018-04-04 RX ADMIN — INSULIN DETEMIR SCH UNITS: 100 INJECTION, SOLUTION SUBCUTANEOUS at 22:10

## 2018-04-04 RX ADMIN — WHITE PETROLATUM SCH PKT: 1 OINTMENT TOPICAL at 09:07

## 2018-04-04 NOTE — CP.PCM.PN
Subjective





- Date & Time of Evaluation


Date of Evaluation: 04/04/18


Time of Evaluation: 08:00





- Subjective


Subjective: 





seen on rounds


s/p cardiac cath and stent


iv rx renewed


had bedside i and d  today 





Objective





- Vital Signs/Intake and Output


Vital Signs (last 24 hours): 


 











Temp Pulse Resp BP Pulse Ox


 


 97.5 F L  85   20   99/57 L  98 


 


 04/04/18 11:59  04/04/18 11:59  04/04/18 11:59  04/04/18 11:59  04/04/18 11:59











- Medications


Medications: 


 Current Medications





Acetaminophen (Tylenol 325mg Tab)  650 mg PO Q4 PRN


   PRN Reason: Fever >100.4 F


Aspirin (Aspirin Chewable)  81 mg PO DAILY [facility]


   Last Admin: 04/04/18 09:08 Dose:  81 mg


Atorvastatin Calcium (Lipitor)  40 mg PO DAILY [facility]


   Last Admin: 04/04/18 09:09 Dose:  40 mg


Collagenase (Santyl)  1 applic TOP DAILY [facility]


   Last Admin: 04/04/18 09:11 Dose:  Not Given


Dextrose (Dextrose 50% Inj)  0 ml IV STAT PRN; Protocol


   PRN Reason: Hypoglycemia Protocol


Dextrose (Glutose 15)  0 gm PO ONCE PRN; Protocol


   PRN Reason: Hypoglycemia Protocol


Emollient Ointment (Vaseline Oint)  1 pkt TOP DAILY [facility]


   Last Admin: 04/04/18 09:07 Dose:  1 pkt


Ferrous Sulfate (Feosol)  325 mg PO DAILY [facility]


   Last Admin: 04/04/18 09:09 Dose:  325 mg


Furosemide (Lasix)  40 mg PO DAILY [facility]


   Last Admin: 04/04/18 09:08 Dose:  40 mg


Gabapentin (Neurontin)  400 mg PO [facility]


   Last Admin: 04/03/18 22:53 Dose:  400 mg


Glucagon (Glucagen Diagnostic Kit)  0 mg IM STAT PRN; Protocol


   PRN Reason: Hypoglycemia Protocol


Vancomycin HCl 1 gm/ Sodium (Chloride)  250 mls @ 166.667 mls/hr IVPB DAILY [facility]


   PRN Reason: Protocol


   Last Admin: 04/04/18 09:14 Dose:  166.667 mls/hr


Insulin Detemir (Levemir)  22 units SC HS [facility]


   Last Admin: 04/03/18 22:54 Dose:  22 units


Insulin Human Lispro (Humalog)  4 units SC AC [facility]


   Last Admin: 04/04/18 09:10 Dose:  4 units


Losartan Potassium (Cozaar)  25 mg PO DAILY [facility]


   Last Admin: 04/04/18 09:07 Dose:  25 mg


Naproxen (Naproxen)  500 mg PO Q12 [facility]


   Last Admin: 04/04/18 09:07 Dose:  500 mg


Pantoprazole Sodium (Protonix Ec Tab)  40 mg PO DAILY [facility]


   Last Admin: 04/04/18 10:08 Dose:  40 mg


Potassium Chloride (K-Dur 20 Meq Er Tab)  20 meq PO DAILY [facility]


   Last Admin: 04/04/18 09:09 Dose:  20 meq


Sitagliptin Phosphate (Januvia)  100 mg PO DAILY@0800 [facility]


   Last Admin: 04/04/18 09:09 Dose:  100 mg


Temazepam (Restoril)  15 mg PO HS PRN


   PRN Reason: Insomnia


Ticagrelor (Brilinta)  90 mg PO BID [facility]


   Last Admin: 04/04/18 09:07 Dose:  90 mg











- Labs


Labs: 


 





 04/03/18 04:20 





 04/03/18 04:20 





 











PT  13.5 Seconds (9.8-13.1)  H  03/30/18  08:08    


 


INR  1.2  (0.9-1.2)   03/30/18  08:08    


 


APTT  35.5 Seconds (25.6-37.1)   03/30/18  08:08    














- Constitutional


Appears: Non-toxic





- Head Exam


Head Exam: NORMOCEPHALIC





- Eye Exam


Eye Exam: PERRL





- ENT Exam


ENT Exam: Mucous Membranes Dry





- Neck Exam


Neck Exam: absent: Lymphadenopathy





- Respiratory Exam


Respiratory Exam: Decreased Breath Sounds





- Cardiovascular Exam


Cardiovascular Exam: REGULAR RHYTHM





- GI/Abdominal Exam


GI & Abdominal Exam: Distended, Soft.  absent: Tenderness





- Rectal Exam


Rectal Exam: Deferred





-  Exam


 Exam: NORMAL INSPECTION





- Extremities Exam


Extremities Exam: Pedal Edema, Tenderness





- Back Exam


Back Exam: absent: CVA tenderness (L), CVA tenderness (R)





Assessment and Plan


(1) CHF (congestive heart failure)


Status: Acute   





(2) Diabetes mellitus type 2 in nonobese


Status: Acute   





(3) Sepsis


Status: Acute

## 2018-04-04 NOTE — CP.PCM.PN
Subjective





- Date & Time of Evaluation


Date of Evaluation: 04/04/18


Time of Evaluation: 08:36





- Subjective


Subjective: 


67 year male seen and evaluated at bedside for left dorsolateral foot eschar 

with abscess. Patient AAO x 3 and NAD, resting comfortably in bed at time of 

visit. Denies any acute overnight event or new pedal complaints. Denies having 

pain to the left foot. Says he feels very well today and slept great. Denies 

any recent N/V/F/C/CP/SOB/D/posterior calf pain when squeezed. 











Objective





- Vital Signs/Intake and Output


Vital Signs (last 24 hours): 


 











Temp Pulse Resp BP Pulse Ox


 


 97.7 F   75   16   100/64   96 


 


 04/04/18 04:51  04/04/18 04:51  04/04/18 04:51  04/04/18 04:51  04/04/18 04:51











- Medications


Medications: 


 Current Medications





Acetaminophen (Tylenol 325mg Tab)  650 mg PO Q4 PRN


   PRN Reason: Fever >100.4 F


Aspirin (Aspirin Chewable)  81 mg PO DAILY Replaced by [facility]


Atorvastatin Calcium (Lipitor)  40 mg PO DAILY [facility]


   Last Admin: 04/02/18 09:54 Dose:  40 mg


Collagenase (Santyl)  1 applic TOP DAILY [facility]


   Last Admin: 04/02/18 09:51 Dose:  1 applic


Dextrose (Dextrose 50% Inj)  0 ml IV STAT PRN; Protocol


   PRN Reason: Hypoglycemia Protocol


Dextrose (Glutose 15)  0 gm PO ONCE PRN; Protocol


   PRN Reason: Hypoglycemia Protocol


Emollient Ointment (Vaseline Oint)  1 pkt TOP DAILY [facility]


   Last Admin: 04/03/18 22:30 Dose:  Not Given


Enoxaparin Sodium (Lovenox)  80 mg SC Q12 [facility]


   PRN Reason: Protocol


   Last Admin: 04/03/18 22:42 Dose:  Not Given


Ferrous Sulfate (Feosol)  325 mg PO DAILY [facility]placed [facility]


   Last Admin: 04/03/18 22:55 Dose:  325 mg


Fluconazole (Diflucan)  200 mg PO DAILY [facility]


   PRN Reason: Protocol


   Last Admin: 04/02/18 09:52 Dose:  200 mg


Furosemide (Lasix)  40 mg PO DAILY [facility]


   Last Admin: 04/02/18 09:54 Dose:  40 mg


Gabapentin (Neurontin)  400 mg PO HS Replaced by [facility]


   Last Admin: 04/03/18 22:53 Dose:  400 mg


Glucagon (Glucagen Diagnostic Kit)  0 mg IM STAT PRN; Protocol


   PRN Reason: Hypoglycemia Protocol


Vancomycin HCl 1 gm/ Sodium (Chloride)  250 mls @ 166.667 mls/hr IVPB DAILY [facility]


   PRN Reason: Protocol


   Last Admin: 04/02/18 09:50 Dose:  166.667 mls/hr


Insulin Detemir (Levemir)  22 units SC HS Replaced by [facility]


   Last Admin: 04/03/18 22:54 Dose:  22 units


Insulin Human Lispro (Humalog)  4 units SC AC Replaced by [facility]


   Last Admin: 04/02/18 07:56 Dose:  4 units


Losartan Potassium (Cozaar)  25 mg PO DAILY [facility]placed [facility]


   Last Admin: 04/02/18 09:54 Dose:  25 mg


Naproxen (Naproxen)  500 mg PO Q12 Replaced by [facility]


   Last Admin: 04/03/18 22:52 Dose:  500 mg


Pantoprazole Sodium (Protonix Ec Tab)  40 mg PO DAILY Replaced [facility]


   Last Admin: 04/02/18 09:53 Dose:  40 mg


Potassium Chloride (K-Dur 20 Meq Er Tab)  20 meq PO DAILY [facility]placed [facility]


   Last Admin: 04/02/18 09:53 Dose:  20 meq


Sitagliptin Phosphate (Januvia)  100 mg PO DAILY@0800 Replaced by [facility]


   Last Admin: 04/02/18 09:53 Dose:  100 mg


Temazepam (Restoril)  15 mg PO HS PRN


   PRN Reason: Insomnia


Ticagrelor (Brilinta)  90 mg PO BID Replaced by [facility]


   Last Admin: 04/03/18 20:30 Dose:  90 mg











- Labs


Labs: 


 





 04/03/18 04:20 





 04/03/18 04:20 





 











PT  13.5 Seconds (9.8-13.1)  H  03/30/18  08:08    


 


INR  1.2  (0.9-1.2)   03/30/18  08:08    


 


APTT  35.5 Seconds (25.6-37.1)   03/30/18  08:08    














- Constitutional


Appears: Well, Non-toxic, No Acute Distress





- Extremities Exam


Additional comments: 


VASC: LLE DP and PT pulses nonpalpable. RLE DP pulse weakly palpable 1/4, PT 

nonpalpable. CFT <3 seconds to all digits x10. Digital hair is present. Mild 

nonpitting edmea noted to left foot, most prominent on the plantar aspect of 

the medial arch, temperature to the LE is warm to warm B/L


NEURO: Protective and gross sensation diminished


DERM: Fibro-necrotic eschar located on the entire lateral aspect of dorsum foot 

is now becoming more fibrotic (70%) with periwound skin erosion and detachment 

noted. Mild fluctuance noted to plantar lateral aspect near 5th met base. Mild 

periwound erythema surrounding entire eschar, no streaking appreciated. No 

purulent drainage expressed. Mild sanguinous drainage elicited from plantar 

lateral aspect of eschar.


ORTHO: no tenderness on palpation of left foot ulceration

















- Neurological Exam


Neurological Exam: Alert, Awake, Oriented x3





- Psychiatric Exam


Psychiatric exam: Normal Affect, Normal Mood





Assessment and Plan





- Assessment and Plan (Free Text)


Assessment: 


67 year male with left foot ulceration with underlying abscess





Plan: 





Patient seen and evaluated at bedside


Plan discussed with attending, Dr. Ascencio


Chart, labs and vitals reviewed - afebrile, WBC 9.3 (down from 13.3 on 3/31/18)


Wound cx (+) for growth of Candida Parasilosis


Continue IV abx per ID - Vancomycin


Continue pain management per medicine


Foot MRI 3/30 shows abscess of left foot


Will hold off on surgical intervention at this time


Podiatry will continue to follow while patient in house

## 2018-04-04 NOTE — CP.PCM.PN
Subjective





- Date & Time of Evaluation


Date of Evaluation: 04/04/18


Time of Evaluation: 17:20





- Subjective


Subjective: 





patient denies chest pain or dyspnea.  doing well after stent placement.





Objective





- Vital Signs/Intake and Output


Vital Signs (last 24 hours): 


 











Temp Pulse Resp BP Pulse Ox


 


 98.1 F   85   20   126/74   98 


 


 04/04/18 16:17  04/04/18 16:17  04/04/18 16:17  04/04/18 16:17  04/04/18 16:17











- Medications


Medications: 


 Current Medications





Acetaminophen (Tylenol 325mg Tab)  650 mg PO Q4 PRN


   PRN Reason: Fever >100.4 F


Aspirin (Aspirin Chewable)  81 mg PO DAILY [facility]


   Last Admin: 04/04/18 09:08 Dose:  81 mg


Atorvastatin Calcium (Lipitor)  40 mg PO DAILY [facility]


   Last Admin: 04/04/18 09:09 Dose:  40 mg


Collagenase (Santyl)  1 applic TOP DAILY [facility]


   Last Admin: 04/04/18 09:11 Dose:  Not Given


Dextrose (Dextrose 50% Inj)  0 ml IV STAT PRN; Protocol


   PRN Reason: Hypoglycemia Protocol


Dextrose (Glutose 15)  0 gm PO ONCE PRN; Protocol


   PRN Reason: Hypoglycemia Protocol


Emollient Ointment (Vaseline Oint)  1 pkt TOP DAILY [facility]


   Last Admin: 04/04/18 09:07 Dose:  1 pkt


Ferrous Sulfate (Feosol)  325 mg PO DAILY [facility]


   Last Admin: 04/04/18 09:09 Dose:  325 mg


Furosemide (Lasix)  40 mg PO DAILY [facility]


   Last Admin: 04/04/18 09:08 Dose:  40 mg


Gabapentin (Neurontin)  400 mg PO HS [facility]


   Last Admin: 04/03/18 22:53 Dose:  400 mg


Glucagon (Glucagen Diagnostic Kit)  0 mg IM STAT PRN; Protocol


   PRN Reason: Hypoglycemia Protocol


Vancomycin HCl 1 gm/ Sodium (Chloride)  250 mls @ 166.667 mls/hr IVPB DAILY [facility]


   PRN Reason: Protocol


   Last Admin: 04/04/18 09:14 Dose:  166.667 mls/hr


Insulin Detemir (Levemir)  22 units SC HS [facility]


   Last Admin: 04/03/18 22:54 Dose:  22 units


Insulin Human Lispro (Humalog)  4 units SC AC [facility]


   Last Admin: 04/04/18 13:29 Dose:  4 units


Losartan Potassium (Cozaar)  25 mg PO DAILY [facility]


   Last Admin: 04/04/18 09:07 Dose:  25 mg


Naproxen (Naproxen)  500 mg PO Q12 [facility]


   Last Admin: 04/04/18 09:07 Dose:  500 mg


Pantoprazole Sodium (Protonix Ec Tab)  40 mg PO DAILY [facility]


   Last Admin: 04/04/18 10:08 Dose:  40 mg


Potassium Chloride (K-Dur 20 Meq Er Tab)  20 meq PO DAILY [facility]


   Last Admin: 04/04/18 09:09 Dose:  20 meq


Sitagliptin Phosphate (Januvia)  100 mg PO DAILY@0800 [facility]


   Last Admin: 04/04/18 09:09 Dose:  100 mg


Temazepam (Restoril)  15 mg PO HS PRN


   PRN Reason: Insomnia


Ticagrelor (Brilinta)  90 mg PO BID [facility]


   Last Admin: 04/04/18 09:07 Dose:  90 mg











- Labs


Labs: 


 





 04/03/18 04:20 





 04/03/18 04:20 





 











PT  13.5 Seconds (9.8-13.1)  H  03/30/18  08:08    


 


INR  1.2  (0.9-1.2)   03/30/18  08:08    


 


APTT  35.5 Seconds (25.6-37.1)   03/30/18  08:08    














- Constitutional


Appears: Non-toxic





- Head Exam


Head Exam: NORMAL INSPECTION





- Eye Exam


Eye Exam: Normal appearance





- ENT Exam


ENT Exam: Mucous Membranes Moist





- Neck Exam


Neck Exam: Full ROM





- Respiratory Exam


Respiratory Exam: NORMAL BREATHING PATTERN





- Cardiovascular Exam


Cardiovascular Exam: REGULAR RHYTHM





- GI/Abdominal Exam


GI & Abdominal Exam: Normal Bowel Sounds





- Rectal Exam


Rectal Exam: Deferred





- Extremities Exam


Extremities Exam: absent: Pedal Edema





- Back Exam


Back Exam: NORMAL INSPECTION





- Neurological Exam


Neurological Exam: Alert





- Psychiatric Exam


Psychiatric exam: Normal Affect





- Skin


Skin Exam: Normal Color





Assessment and Plan


(1) NSTEMI (non-ST elevated myocardial infarction)


Assessment & Plan: 


s/p PCI left circumflex.  continue ASA 81 mg daily, Brilinta 90 mg BID.


Status: Acute

## 2018-04-05 VITALS — RESPIRATION RATE: 20 BRPM

## 2018-04-05 PROCEDURE — F07M6FZ THERAPEUTIC EXERCISE TREATMENT OF MUSCULOSKELETAL SYSTEM - WHOLE BODY USING ASSISTIVE, ADAPTIVE, SUPPORTIVE OR PROTECTIVE EQUIPMENT: ICD-10-PCS | Performed by: FAMILY MEDICINE

## 2018-04-05 RX ADMIN — INSULIN DETEMIR SCH UNITS: 100 INJECTION, SOLUTION SUBCUTANEOUS at 21:02

## 2018-04-05 RX ADMIN — INSULIN LISPRO SCH UNITS: 100 INJECTION, SOLUTION INTRAVENOUS; SUBCUTANEOUS at 16:35

## 2018-04-05 RX ADMIN — INSULIN LISPRO SCH UNITS: 100 INJECTION, SOLUTION INTRAVENOUS; SUBCUTANEOUS at 08:32

## 2018-04-05 RX ADMIN — POTASSIUM CHLORIDE SCH MEQ: 20 TABLET, EXTENDED RELEASE ORAL at 08:34

## 2018-04-05 RX ADMIN — WHITE PETROLATUM SCH PKT: 1 OINTMENT TOPICAL at 08:32

## 2018-04-05 RX ADMIN — INSULIN LISPRO SCH: 100 INJECTION, SOLUTION INTRAVENOUS; SUBCUTANEOUS at 11:20

## 2018-04-05 RX ADMIN — COLLAGENASE SANTYL SCH APPLIC: 250 OINTMENT TOPICAL at 08:32

## 2018-04-05 RX ADMIN — PANTOPRAZOLE SODIUM SCH MG: 40 TABLET, DELAYED RELEASE ORAL at 08:35

## 2018-04-05 NOTE — RAD
PROCEDURE:  Left Foot Radiographs.



HISTORY:

left foot abscess  



COMPARISON:

03/16/2018 left foot radiographs.



03/29/2018 MRI left foot



FINDINGS:



BONES:

No acute fractures.



No radiographic manifestations of acute osteomyelitis. 



JOINTS:

Degenerative changes, hallux valgus deformity, hammertoe deformities 

noted. 



SOFT TISSUES:

Soft tissue swelling identified dorsal and plantar aspect of the left 

foot. 



OTHER FINDINGS:

None.



IMPRESSION:

Soft tissue swelling without acute articular or osseous abnormality.

## 2018-04-05 NOTE — CP.PCM.HP
<Kurt Enrique - Last Filed: 04/05/18 13:44>





History of Present Illness





- History of Present Illness


History of Present Illness: 





HPI:  66 y/o M with PMhx of uncontrolled IDDM and bipolar disorder admitted for 

continuation of IV antibiotics and physical therapy. Patient found to have 

NSTEMI and sent for cath at [facility].  Patient had stent 

placed and has since been recovering well.  Patient has chronic left foot ulcer 

treated by podiatry.  Patient denies headaches, chest pain, SOB, abdominal pain

, diarrhea, vomiting, nausea. Pain in L/LE is conrtolled with meds.





Present on Admission





- Present on Admission


Any Indicators Present on Admission: Yes


History of DVT/PE: No


History of Uncontrolled Diabetes: Yes


Urinary Catheter: No


Decubitus Ulcer Present: No





Review of Systems





- Review of Systems


All systems: reviewed and no additional remarkable complaints except





- Constitutional


Constitutional: absent: Chills, Fever





- EENT


Eyes: absent: Change in Vision





- Cardiovascular


Cardiovascular: absent: Chest Pain





- Respiratory


Respiratory: absent: Dyspnea





- Gastrointestinal


Gastrointestinal: absent: Abdominal Pain, Diarrhea, Nausea, Vomiting





- Genitourinary


Genitourinary: absent: Dysuria





- Integumentary


Integumentary: absent: Rash





- Neurological


Neurological: absent: Dizziness, Headaches





Past Patient History





- Past Medical History & Family History


Past Medical History?: Yes





- Past Social History


Smoking Status: Never Smoked





- CARDIAC


Hx Cardiac Disorders: Yes


Hx Congestive Heart Failure: Yes


Other/Comment: cardiac cath with stent 4/2018





- PULMONARY


Hx Respiratory Disorders: No





- NEUROLOGICAL


Hx Neurological Disorder: No





- HEENT


Hx HEENT Problems: No





- RENAL


Hx Chronic Kidney Disease: No





- ENDOCRINE/METABOLIC


Hx Endocrine Disorders: Yes


Hx Diabetes Mellitus Type 2: Yes


Other/Comment: chronic LLE ulcer I&D on 4/4/18.  DKA





- HEMATOLOGICAL/ONCOLOGICAL


Hx Blood Disorders: No





- INTEGUMENTARY


Hx Dermatological Problems: No


Other/Comment: abcess  chronic diabetic ulcer





- MUSCULOSKELETAL/RHEUMATOLOGICAL


Hx Musculoskeletal Disorders: No


Hx Falls: No





- GASTROINTESTINAL


Hx Gastrointestinal Disorders: Yes


Hx Pancreatitis: Yes





- GENITOURINARY/GYNECOLOGICAL


Hx Genitourinary Disorders: No





- PSYCHIATRIC


Hx Psychophysiologic Disorder: No


Hx Substance Use: No





- SURGICAL HISTORY


Hx Surgeries: Yes


Other/Comment: removal of pancreas I&D LLE





- ANESTHESIA


Hx Anesthesia: Yes


Hx Anesthesia Reactions: No


Hx Malignant Hyperthermia: No





Meds


Allergies/Adverse Reactions: 


 Allergies











Allergy/AdvReac Type Severity Reaction Status Date / Time


 


No Known Allergies Allergy   Verified 03/30/18 06:46














Physical Exam





- Constitutional


Appears: Non-toxic, No Acute Distress





- Head Exam


Head Exam: ATRAUMATIC, NORMAL INSPECTION, NORMOCEPHALIC





- Eye Exam


Eye Exam: Normal appearance





- ENT Exam


ENT Exam: Mucous Membranes Moist





- Neck Exam


Neck exam: Positive for: Full Rom.  Negative for: Tenderness





- Respiratory Exam


Respiratory Exam: Clear to Auscultation Bilateral.  absent: Accessory Muscle Use

, Decreased Breath Sounds, Rales, Rhonchi, Wheezes, Respiratory Distress





- Cardiovascular Exam


Cardiovascular Exam: REGULAR RHYTHM.  absent: Tachycardia





- GI/Abdominal Exam


GI & Abdominal Exam: Normal Bowel Sounds, Soft.  absent: Distended, Tenderness





- Extremities Exam


Extremities exam: Negative for: calf tenderness, pedal edema, tenderness


Additional comments: 





chronic left foot ulcer w/ necrosis, dressed c/d/i








- Neurological Exam


Neurological exam: Alert, Oriented x3





- Skin


Skin Exam: Dry, Intact, Normal Color, Warm





Results





- Vital Signs


Recent Vital Signs: 





 Last Vital Signs











Temp  98.2 F   04/05/18 08:20


 


Pulse  88   04/05/18 08:33


 


Resp  20   04/05/18 08:20


 


BP  120/67   04/05/18 08:33


 


Pulse Ox  100   04/05/18 08:20














Assessment & Plan


(1) CHF (congestive heart failure)


Status: Acute   


Comment: reduced EF CHF (EF 40-45%)   





(2) NSTEMI (non-ST elevated myocardial infarction)


Status: Resolved   


Comment: s/p cath w/ stent placement 04/03/2018   





(3) Chronic ulcer of leg


Status: Chronic   





- Assessment and Plan (Free Text)


Plan: 





c/w present management


podiatry recommendations appreciated 


vancomycin 1 gm IV daily day 6


c/w PT/OT


monitor for acute changes


f/u CBC and CMP in AM





<Gunner Valdez - Last Filed: 04/05/18 21:19>





Results





- Vital Signs


Recent Vital Signs: 





 Last Vital Signs











Temp  99.1 F   04/05/18 20:39


 


Pulse  87   04/05/18 20:39


 


Resp  20   04/05/18 20:39


 


BP  102/72   04/05/18 20:39


 


Pulse Ox  99   04/05/18 20:39














- Labs


Labs: 





 Laboratory Results - last 24 hr











  04/05/18 04/05/18 04/05/18





  10:58 15:33 20:54


 


POC Glucose (mg/dL)  62 L  180 H  287 H














Assessment & Plan





- Assessment and Plan (Free Text)


Plan: 





I was present during evaluation and discussed with dr judith pena plans of care 

and tx








Gunner Valdez M.D.

## 2018-04-05 NOTE — CP.PCM.PN
Subjective





- Date & Time of Evaluation


Date of Evaluation: 04/03/18


Time of Evaluation: 09:35





- Subjective


Subjective: 





Patient remains stable


 for cardiac cath


Has no chest pain





Objective





- Vital Signs/Intake and Output


Vital Signs (last 24 hours): 


 











Temp Pulse Resp BP Pulse Ox


 


 98.1 F   85   20   126/74   98 


 


 04/04/18 16:17  04/04/18 16:17  04/04/18 16:17  04/04/18 16:17  04/04/18 16:17








Intake and Output: 


 











 04/04/18 04/05/18





 18:59 06:59


 


Intake Total 1250 


 


Output Total 1400 


 


Balance -150 














- Labs


Labs: 


 





 04/03/18 04:20 





 04/03/18 04:20 





 











PT  13.5 Seconds (9.8-13.1)  H  03/30/18  08:08    


 


INR  1.2  (0.9-1.2)   03/30/18  08:08    


 


APTT  35.5 Seconds (25.6-37.1)   03/30/18  08:08    














- Head Exam


Head Exam: NORMAL INSPECTION





- Eye Exam


Eye Exam: Normal appearance





- Respiratory Exam


Respiratory Exam: NORMAL BREATHING PATTERN





- Cardiovascular Exam


Cardiovascular Exam: REGULAR RHYTHM





- GI/Abdominal Exam


GI & Abdominal Exam: Normal Bowel Sounds





- Neurological Exam


Neurological Exam: Awake, Oriented x3





- Psychiatric Exam


Psychiatric exam: Normal Mood





- Skin


Skin Exam: Dry





Assessment and Plan


(1) CHF (congestive heart failure)


Status: Acute   





(2) NSTEMI (non-ST elevated myocardial infarction)


Status: Resolved   





(3) Diabetes mellitus type 2 in nonobese


Status: Chronic   





(4) Leukocytosis


Status: Acute   





(5) Sepsis


Status: Acute   





(6) Chronic ulcer of leg


Status: Chronic   





- Assessment and Plan (Free Text)


Plan: 





Con tmeds


Cont tx


Cont Pt


 follow up with cardiology

## 2018-04-05 NOTE — CP.PCM.CON
History of Present Illness





- History of Present Illness


History of Present Illness: 





68 y/o male seen at bedside in TCU today for left foot abscess. Pt resting 

comfortably in bed in NAD, AAOx3. Denies any events overnight. Denies any pain 

in the left foot. States he was having some bleeding through his dressing so 

his nurses wrapped him up in more bandages. Denies F/C/N/V/CP/SOB





Review of Systems





- Review of Systems


All systems: reviewed and no additional remarkable complaints except (per HPI)





Past Patient History





- Past Medical History & Family History


Past Medical History?: Yes





- Past Social History


Smoking Status: Never Smoked





- CARDIAC


Hx Cardiac Disorders: Yes


Hx Congestive Heart Failure: Yes


Other/Comment: cardiac cath with stent 4/2018





- PULMONARY


Hx Respiratory Disorders: No





- NEUROLOGICAL


Hx Neurological Disorder: No





- HEENT


Hx HEENT Problems: No





- RENAL


Hx Chronic Kidney Disease: No





- ENDOCRINE/METABOLIC


Hx Endocrine Disorders: Yes


Hx Diabetes Mellitus Type 2: Yes


Other/Comment: chronic LLE ulcer I&D on 4/4/18.  DKA





- HEMATOLOGICAL/ONCOLOGICAL


Hx Blood Disorders: No





- INTEGUMENTARY


Hx Dermatological Problems: No


Other/Comment: abcess  chronic diabetic ulcer





- MUSCULOSKELETAL/RHEUMATOLOGICAL


Hx Musculoskeletal Disorders: No


Hx Falls: No





- GASTROINTESTINAL


Hx Gastrointestinal Disorders: Yes


Hx Pancreatitis: Yes





- GENITOURINARY/GYNECOLOGICAL


Hx Genitourinary Disorders: No





- PSYCHIATRIC


Hx Psychophysiologic Disorder: No


Hx Substance Use: No





- SURGICAL HISTORY


Hx Surgeries: Yes


Other/Comment: removal of pancreas I&D LLE





- ANESTHESIA


Hx Anesthesia: Yes


Hx Anesthesia Reactions: No


Hx Malignant Hyperthermia: No





Meds


Allergies/Adverse Reactions: 


 Allergies











Allergy/AdvReac Type Severity Reaction Status Date / Time


 


No Known Allergies Allergy   Verified 03/30/18 06:46














- Medications


Medications: 


 Current Medications





Acetaminophen (Tylenol 325mg Tab)  650 mg PO Q4 PRN


   PRN Reason: Fever >100.4 F


Atorvastatin Calcium (Lipitor)  40 mg PO DAILY@2100 [facility]


Collagenase (Santyl)  1 applic TOP DAILY [facility]


   Last Admin: 04/05/18 08:32 Dose:  1 applic


Dextrose (Dextrose 50% Inj)  50 ml IVP ONCE PRN


   PRN Reason: Hypoglycemia


Dextrose (Glutose 15)  15 gm PO ONCE PRN


   PRN Reason: Hypoglycemia


Emollient Ointment (Vaseline Oint)  1 pkt TOP DAILY [facility]


   Last Admin: 04/05/18 08:32 Dose:  1 pkt


Ferrous Sulfate (Feosol)  325 mg PO DAILY [facility]


   Last Admin: 04/05/18 08:34 Dose:  325 mg


Gabapentin (Neurontin)  400 mg PO HS [facility]


   Last Admin: 04/04/18 22:09 Dose:  400 mg


Glucagon (Glucagen Diagnostic Kit)  1 mg IM STAT PRN


   PRN Reason: Hypoglycemia


Vancomycin HCl 1 gm/ Sodium (Chloride)  250 mls @ 166.667 mls/hr IVPB DAILY@

1700 [facility]


   PRN Reason: Protocol


Insulin Detemir (Levemir)  22 units SC HS [facility]


   Last Admin: 04/04/18 22:10 Dose:  22 units


Insulin Human Lispro (Humalog)  4 units SC ACTID [facility]


   Last Admin: 04/05/18 11:20 Dose:  Not Given


Losartan Potassium (Cozaar)  25 mg PO DAILY [facility]


   Last Admin: 04/05/18 08:33 Dose:  25 mg


Pantoprazole Sodium (Protonix Ec Tab)  40 mg PO DAILY [facility]


   Last Admin: 04/05/18 08:35 Dose:  40 mg


Potassium Chloride (K-Dur 20 Meq Er Tab)  20 meq PO DAILY [facility]


   Last Admin: 04/05/18 08:34 Dose:  20 meq


Sitagliptin Phosphate (Januvia)  100 mg PO DAILY@0800 [facility]


   Last Admin: 04/05/18 08:34 Dose:  100 mg


Temazepam (Restoril)  15 mg PO HS PRN


   PRN Reason: Insomnia


Ticagrelor (Brilinta)  90 mg PO BID [facility]


   Last Admin: 04/05/18 08:33 Dose:  90 mg











Physical Exam





- Constitutional


Appears: Well, Non-toxic, No Acute Distress





- Extremities Exam


Additional comments: 


VASC: LLE DP and PT pulses nonpalpable. RLE DP pulse weakly palpable 1/4, PT 

nonpalpable. CFT <3 seconds to all digits x10. Pedal hair growth present. Mild 

nonpitting edema noted to left foot, most prominent on the plantar aspect of 

the medial arch. Temperature gradient warm to warm B/L


NEURO: Protective and gross sensation diminished


DERM: Fibro-necrotic eschar located on the entire dorsolateral foot with mixed 

fibrotic and necrotic base with periwound skin erosion and detachment noted. 

Mild fluctuance noted to plantar lateral aspect near 5th met base as well as to 

plantar medial arch. Mild periwound erythema surrounding entire eschar, no 

streaking appreciated. No purulent drainage expressed. Mild sanguinous drainage 

elicited from plantar lateral aspect of eschar. Skin sloughing noted to 

entirety of wound borders.


ORTHO: no tenderness on palpation of left foot ulceration











- Neurological Exam


Neurological exam: Alert, Oriented x3





- Psychiatric Exam


Psychiatric exam: Normal Affect, Normal Mood





Results





- Vital Signs


Recent Vital Signs: 


 Last Vital Signs











Temp  98.2 F   04/05/18 08:20


 


Pulse  88   04/05/18 08:33


 


Resp  20   04/05/18 08:20


 


BP  120/67   04/05/18 08:33


 


Pulse Ox  100   04/05/18 08:20














Assessment & Plan





- Assessment and Plan (Free Text)


Assessment: 


67 year male with left foot ulceration with underlying abscess





Plan: 





Patient seen and evaluated at bedside


Plan discussed with attending, Dr. Ascencio


Chart, labs and vitals reviewed - afebrile, WBC 9.3 (down from 13.3 on 3/31/18)


Wound cx (+) for growth of Candida Parasilosis


Continue IV abx per ID - Vancomycin


Continue pain management per medicine


Foot MRI 3/30 shows abscess of left foot


New x-rays ordered of L foot to evaluate for osseous changes, r/o soft tissue 

emphysema


Will hold off on surgical intervention at this time


Pt may be full WB with use of surgical shoe


Podiatry will continue to follow while patient in house

## 2018-04-06 LAB
ALBUMIN SERPL-MCNC: 3.1 G/DL (ref 3.5–5)
ALBUMIN/GLOB SERPL: 0.7 {RATIO} (ref 1–2.1)
ALT SERPL-CCNC: 44 U/L (ref 21–72)
AST SERPL-CCNC: 49 U/L (ref 17–59)
BASOPHILS # BLD AUTO: 0.1 K/UL (ref 0–0.2)
BASOPHILS NFR BLD: 0.7 % (ref 0–2)
BUN SERPL-MCNC: 18 MG/DL (ref 9–20)
CALCIUM SERPL-MCNC: 9.1 MG/DL (ref 8.4–10.2)
EOSINOPHIL # BLD AUTO: 1.9 K/UL (ref 0–0.7)
EOSINOPHIL NFR BLD: 16.9 % (ref 0–4)
ERYTHROCYTE [DISTWIDTH] IN BLOOD BY AUTOMATED COUNT: 17.1 % (ref 11.5–14.5)
GFR NON-AFRICAN AMERICAN: > 60
HGB BLD-MCNC: 9.1 G/DL (ref 12–18)
LYMPHOCYTES # BLD AUTO: 1.8 K/UL (ref 1–4.3)
LYMPHOCYTES NFR BLD AUTO: 16.1 % (ref 20–40)
MCH RBC QN AUTO: 26.4 PG (ref 27–31)
MCHC RBC AUTO-ENTMCNC: 31.8 G/DL (ref 33–37)
MCV RBC AUTO: 83.1 FL (ref 80–94)
MONOCYTES # BLD: 1 K/UL (ref 0–0.8)
MONOCYTES NFR BLD: 9 % (ref 0–10)
NEUTROPHILS # BLD: 6.6 K/UL (ref 1.8–7)
NEUTROPHILS NFR BLD AUTO: 57.3 % (ref 50–75)
NRBC BLD AUTO-RTO: 0.1 % (ref 0–0)
PLATELET # BLD: 450 K/UL (ref 130–400)
PMV BLD AUTO: 10.7 FL (ref 7.2–11.7)
RBC # BLD AUTO: 3.46 MIL/UL (ref 4.4–5.9)
WBC # BLD AUTO: 11.4 K/UL (ref 4.8–10.8)

## 2018-04-06 RX ADMIN — WHITE PETROLATUM SCH PKT: 1 OINTMENT TOPICAL at 08:12

## 2018-04-06 RX ADMIN — COLLAGENASE SANTYL SCH APPLIC: 250 OINTMENT TOPICAL at 08:12

## 2018-04-06 RX ADMIN — POTASSIUM CHLORIDE SCH MEQ: 20 TABLET, EXTENDED RELEASE ORAL at 08:13

## 2018-04-06 RX ADMIN — PANTOPRAZOLE SODIUM SCH MG: 40 TABLET, DELAYED RELEASE ORAL at 08:13

## 2018-04-06 RX ADMIN — INSULIN LISPRO SCH UNITS: 100 INJECTION, SOLUTION INTRAVENOUS; SUBCUTANEOUS at 08:11

## 2018-04-06 NOTE — CP.PCM.PN
<Kurt Enrique - Last Filed: 04/06/18 13:46>





Subjective





- Date & Time of Evaluation


Date of Evaluation: 04/06/18


Time of Evaluation: 09:15





- Subjective


Subjective: 





Patient seen and examined at bedside this morning.  There are no acute events 

overnight, NAD.  Patient denies pain with his left foot.  Patient seen by 

podiatry.  The patient had accucheck of 45 but denied dizziness, diaphoresis, 

or confusion.  Patient denies headaches, chest pain, SOB, abdominal pain, nausea

, vomiting, diarrhea, dysuria, or fever.





Objective





- Vital Signs/Intake and Output


Vital Signs (last 24 hours): 


 











Temp Pulse Resp BP Pulse Ox


 


 98.1 F   81   20   102/54 L  98 


 


 04/06/18 07:58  04/06/18 08:13  04/06/18 07:58  04/06/18 08:13  04/06/18 08:50











- Medications


Medications: 


 Current Medications





Acetaminophen (Tylenol 325mg Tab)  650 mg PO Q4 PRN


   PRN Reason: Fever >100.4 F


Atorvastatin Calcium (Lipitor)  40 mg PO DAILY@2100 [facility]


   Last Admin: 04/05/18 21:10 Dose:  40 mg


Collagenase (Santyl)  1 applic TOP DAILY [facility]


   Last Admin: 04/06/18 08:12 Dose:  1 applic


Dextrose (Dextrose 50% Inj)  50 ml IVP ONCE PRN


   PRN Reason: Hypoglycemia


Dextrose (Glutose 15)  15 gm PO ONCE PRN


   PRN Reason: Hypoglycemia


Emollient Ointment (Vaseline Oint)  1 pkt TOP DAILY [facility]


   Last Admin: 04/06/18 08:12 Dose:  1 pkt


Ferrous Sulfate (Feosol)  325 mg PO DAILY [facility]


   Last Admin: 04/06/18 08:13 Dose:  325 mg


Gabapentin (Neurontin)  400 mg PO HS [facility]


   Last Admin: 04/05/18 21:10 Dose:  400 mg


Glucagon (Glucagen Diagnostic Kit)  1 mg IM STAT PRN


   PRN Reason: Hypoglycemia


Vancomycin HCl 1 gm/ Sodium (Chloride)  250 mls @ 166.667 mls/hr IVPB DAILY@

1700 [facility]


   PRN Reason: Protocol


   Last Admin: 04/05/18 16:34 Dose:  166.667 mls/hr


Insulin Detemir (Levemir)  22 units SC [facility]


   Last Admin: 04/05/18 21:02 Dose:  22 units


Losartan Potassium (Cozaar)  25 mg PO DAILY [facility]


   Last Admin: 04/06/18 08:13 Dose:  25 mg


Pantoprazole Sodium (Protonix Ec Tab)  40 mg PO DAILY [facility]


   Last Admin: 04/06/18 08:13 Dose:  40 mg


Potassium Chloride (K-Dur 20 Meq Er Tab)  20 meq PO DAILY [facility]


   Last Admin: 04/06/18 08:13 Dose:  20 meq


Sitagliptin Phosphate (Januvia)  100 mg PO DAILY@0800 [facility]


   Last Admin: 04/06/18 08:13 Dose:  100 mg


Temazepam (Restoril)  15 mg PO HS PRN


   PRN Reason: Insomnia


Ticagrelor (Brilinta)  90 mg PO BID [facility]


   Last Admin: 04/06/18 08:12 Dose:  90 mg


Tramadol HCl (Ultram)  50 mg PO Q6 PRN


   PRN Reason: Pain, moderate (4-7)


   Last Admin: 04/06/18 07:01 Dose:  50 mg











- Labs


Labs: 


 





 04/06/18 05:55 





 04/06/18 05:55 











- Constitutional


Appears: Non-toxic, No Acute Distress





- Head Exam


Head Exam: ATRAUMATIC, NORMAL INSPECTION, NORMOCEPHALIC





- Eye Exam


Eye Exam: Normal appearance





- ENT Exam


ENT Exam: Mucous Membranes Moist





- Neck Exam


Neck Exam: Full ROM.  absent: Tenderness





- Respiratory Exam


Respiratory Exam: Clear to Ausculation Bilateral.  absent: Accessory Muscle Use

, Decreased Breath Sounds, Rales, Rhonchi, Wheezes, Respiratory Distress





- Cardiovascular Exam


Cardiovascular Exam: REGULAR RHYTHM.  absent: Tachycardia





- GI/Abdominal Exam


GI & Abdominal Exam: Soft, Normal Bowel Sounds.  absent: Distended, Tenderness





- Extremities Exam


Additional comments: 





chronic left foot ulcer w/ necrosis, dressed c/d/i





- Neurological Exam


Neurological Exam: Alert, Awake, Oriented x3





- Skin


Skin Exam: Dry, Intact, Normal Color, Warm





Assessment and Plan


(1) CHF (congestive heart failure)


Status: Acute   





(2) NSTEMI (non-ST elevated myocardial infarction)


Status: Resolved   





(3) Chronic ulcer of leg


Status: Chronic   





- Assessment and Plan (Free Text)


Plan: 





c/w present management


podiatry recommendations appreciated 


vancomycin 1 gm IV daily day 7


insulin levemir 22 units SC HS


c/w PT/OT


monitor for acute changes








<Gunner Valdez - Last Filed: 04/07/18 23:22>





Objective





- Vital Signs/Intake and Output


Vital Signs (last 24 hours): 


 











Temp Pulse Resp BP Pulse Ox


 


 98.3 F   87   20   120/67   96 


 


 04/07/18 22:45  04/07/18 22:45  04/07/18 22:45  04/07/18 22:45  04/07/18 22:45











- Medications


Medications: 


 Current Medications





Acetaminophen (Tylenol 325mg Tab)  650 mg PO Q4 PRN


   PRN Reason: Fever >100.4 F


Atorvastatin Calcium (Lipitor)  40 mg PO DAILY@2100 [facility]


   Last Admin: 04/07/18 22:11 Dose:  40 mg


Collagenase (Santyl)  1 applic TOP DAILY [facility]


   Last Admin: 04/07/18 16:34 Dose:  1 applic


Dextrose (Dextrose 50% Inj)  50 ml IVP ONCE PRN


   PRN Reason: Hypoglycemia


Dextrose (Glutose 15)  15 gm PO ONCE PRN


   PRN Reason: Hypoglycemia


Emollient Ointment (Vaseline Oint)  1 pkt TOP DAILY [facility]


   Last Admin: 04/07/18 08:29 Dose:  1 pkt


Ferrous Sulfate (Feosol)  325 mg PO DAILY [facility]


   Last Admin: 04/07/18 08:30 Dose:  325 mg


Gabapentin (Neurontin)  400 mg PO HS [facility]


   Last Admin: 04/06/18 21:48 Dose:  400 mg


Glipizide (Glucotrol)  10 mg PO ACBD [facility]


   Last Admin: 04/07/18 16:33 Dose:  10 mg


Glucagon (Glucagen Diagnostic Kit)  1 mg IM STAT PRN


   PRN Reason: Hypoglycemia


Vancomycin HCl 1 gm/ Sodium (Chloride)  250 mls @ 166.667 mls/hr IVPB DAILY@

1700 [facility]


   PRN Reason: Protocol


   Last Admin: 04/07/18 16:32 Dose:  166.667 mls/hr


Insulin Detemir (Levemir)  20 units SC HS [facility]


   Last Admin: 04/07/18 22:09 Dose:  20 u


Losartan Potassium (Cozaar)  25 mg PO DAILY [facility]


   Last Admin: 04/07/18 08:27 Dose:  25 mg


Pantoprazole Sodium (Protonix Ec Tab)  40 mg PO DAILY [facility]


   Last Admin: 04/07/18 08:27 Dose:  40 mg


Potassium Chloride (K-Dur 20 Meq Er Tab)  20 meq PO DAILY [facility]


   Last Admin: 04/07/18 08:27 Dose:  20 meq


Sitagliptin Phosphate (Januvia)  100 mg PO DAILY@0800 [facility]


   Last Admin: 04/07/18 08:27 Dose:  100 mg


Temazepam (Restoril)  15 mg PO HS PRN


   PRN Reason: Insomnia


Ticagrelor (Brilinta)  90 mg PO BID [facility]


   Last Admin: 04/07/18 16:34 Dose:  90 mg


Tramadol HCl (Ultram)  50 mg PO Q6 PRN


   PRN Reason: Pain, moderate (4-7)


   Last Admin: 04/06/18 07:01 Dose:  50 mg











- Labs


Labs: 


 





 04/06/18 05:55 





 04/06/18 05:55 











Assessment and Plan





- Assessment and Plan (Free Text)


Plan: 


I was present during evaluation and discussed with Dr Enrique re plans of care 

and tx.





Gunner Valdez M.D.

## 2018-04-07 RX ADMIN — PANTOPRAZOLE SODIUM SCH MG: 40 TABLET, DELAYED RELEASE ORAL at 08:27

## 2018-04-07 RX ADMIN — COLLAGENASE SANTYL SCH APPLIC: 250 OINTMENT TOPICAL at 16:34

## 2018-04-07 RX ADMIN — POTASSIUM CHLORIDE SCH MEQ: 20 TABLET, EXTENDED RELEASE ORAL at 08:27

## 2018-04-07 RX ADMIN — INSULIN DETEMIR SCH U: 100 INJECTION, SOLUTION SUBCUTANEOUS at 22:09

## 2018-04-07 RX ADMIN — WHITE PETROLATUM SCH PKT: 1 OINTMENT TOPICAL at 08:29

## 2018-04-07 NOTE — CP.PCM.PN
Subjective





- Date & Time of Evaluation


Date of Evaluation: 04/07/18


Time of Evaluation: 16:30





- Subjective


Subjective: 





Patient remains stable


Has no SOB


Has no fever.





Objective





- Vital Signs/Intake and Output


Vital Signs (last 24 hours): 


 











Temp Pulse Resp BP Pulse Ox


 


 98.3 F   87   20   120/67   96 


 


 04/07/18 22:45  04/07/18 22:45  04/07/18 22:45  04/07/18 22:45  04/07/18 22:45











- Medications


Medications: 


 Current Medications





Acetaminophen (Tylenol 325mg Tab)  650 mg PO Q4 PRN


   PRN Reason: Fever >100.4 F


Atorvastatin Calcium (Lipitor)  40 mg PO DAILY@2100 [facility]


   Last Admin: 04/07/18 22:11 Dose:  40 mg


Collagenase (Santyl)  1 applic TOP DAILY [facility]


   Last Admin: 04/07/18 16:34 Dose:  1 applic


Dextrose (Dextrose 50% Inj)  50 ml IVP ONCE PRN


   PRN Reason: Hypoglycemia


Dextrose (Glutose 15)  15 gm PO ONCE PRN


   PRN Reason: Hypoglycemia


Emollient Ointment (Vaseline Oint)  1 pkt TOP DAILY [facility]


   Last Admin: 04/07/18 08:29 Dose:  1 pkt


Ferrous Sulfate (Feosol)  325 mg PO DAILY [facility]


   Last Admin: 04/07/18 08:30 Dose:  325 mg


Gabapentin (Neurontin)  400 mg PO HS [facility]


   Last Admin: 04/06/18 21:48 Dose:  400 mg


Glipizide (Glucotrol)  10 mg PO ACBD [facility]


   Last Admin: 04/07/18 16:33 Dose:  10 mg


Glucagon (Glucagen Diagnostic Kit)  1 mg IM STAT PRN


   PRN Reason: Hypoglycemia


Vancomycin HCl 1 gm/ Sodium (Chloride)  250 mls @ 166.667 mls/hr IVPB DAILY@

1700 [facility]


   PRN Reason: Protocol


   Last Admin: 04/07/18 16:32 Dose:  166.667 mls/hr


Insulin Detemir (Levemir)  20 units SC [facility]


   Last Admin: 04/07/18 22:09 Dose:  20 u


Losartan Potassium (Cozaar)  25 mg PO DAILY [facility]


   Last Admin: 04/07/18 08:27 Dose:  25 mg


Pantoprazole Sodium (Protonix Ec Tab)  40 mg PO DAILY [facility]


   Last Admin: 04/07/18 08:27 Dose:  40 mg


Potassium Chloride (K-Dur 20 Meq Er Tab)  20 meq PO DAILY [facility]


   Last Admin: 04/07/18 08:27 Dose:  20 meq


Sitagliptin Phosphate (Januvia)  100 mg PO DAILY@0800 [facility]


   Last Admin: 04/07/18 08:27 Dose:  100 mg


Temazepam (Restoril)  15 mg PO HS PRN


   PRN Reason: Insomnia


Ticagrelor (Brilinta)  90 mg PO BID [facility]


   Last Admin: 04/07/18 16:34 Dose:  90 mg


Tramadol HCl (Ultram)  50 mg PO Q6 PRN


   PRN Reason: Pain, moderate (4-7)


   Last Admin: 04/06/18 07:01 Dose:  50 mg











- Labs


Labs: 


 





 04/06/18 05:55 





 04/06/18 05:55

## 2018-04-07 NOTE — PN
ENDOCRINOLOGY FOLLOWUP NOTE



DATE:



LOCATION:  In room 708.



SUBJECTIVE:  This is a 67-year-old male with recent admission for

congestive heart failure and has since then improved clinically and

hemodynamically as noted thereof and is also being referred now for further

an ongoing diabetic management.  His glycemic levels are fluctuating

because of the variability of his oral intake as noted thereof.  His latest

glucose levels have ranged from 45 early this morning to 67 and 89 mg/dL. 

His latest chemistries showed a BUN of 18, sodium 141, potassium 3.9,

chloride 104, CO2 of 27, glucose 118, and creatinine 0.8.  His bedtime

glucose was 287 mg/dL.  So, at this time, we will modify once again his

basal and bolus insulin regimen and lower the Levemir to 14 units

subcutaneously at bedtime daily to start tonight.  We will also continue

Januvia given as 100 mg once daily in the morning as ordered.  We will also

be adding a low dose oral hypoglycemic therapy with glipizide given as 5 mg

b.i.d. before meals as ordered.  We will continue the low-dose correction

scale using Humalog insulin as given.  We will obtain serial chemistries

and supplement accordingly as needed.  We will follow.





__________________________________________

Bernie Collins MD





DD:  04/06/2018 23:01:37

DT:  04/06/2018 23:05:45

Job # [id number]

## 2018-04-07 NOTE — PN
DATE:  ENDOCRINOLOGY FOLLOWUP NOTE



LOCATION:  Room number 708.



SUBJECTIVE:  This is a 67-year-old male with recent uncontrolled type 2

insulin-requiring diabetes with extremes of glycemic fluctuations related

to the variability of his oral intake as noted thereof.  Yesterday he had

developed several episodes of symptomatic hypoglycemia with the lowest

glucose down to 45 mg/dL.



LABORATORY DATA:  His latest chemistry showed a BUN of 18, sodium of 141,

potassium of 3.9, chloride of 104, CO2 of 27, glucose of 118 and creatinine

of 0.8.



ASSESSMENT AND PLAN:  However, overnight, he developed once again with

supervening hyperglycemic accelerations and glucose levels ranging from 288

to 395 mg/dL.  So, at this time, we will modify once again his oral

hypoglycemic therapy and increase the Levemir and increase the glipizide to

10 mg b.i.d. before meals to start today.  We will continue the Januvia

given as 100 mg once daily in the morning as ordered.  We will also

increase and titrate his basal insulin with Levemir to be given as 20 units

subcutaneous at bedtime daily as given.  We will titrate incrementally as

indicated to optimize metabolic control.  We will follow and advise

accordingly.







__________________________________________

Bernie Collins MD







DD:  04/07/2018 10:35:38

DT:  04/07/2018 10:37:30

Job # [id number]

## 2018-04-08 LAB
ALBUMIN SERPL-MCNC: 3.3 G/DL (ref 3.5–5)
ALBUMIN/GLOB SERPL: 0.7 {RATIO} (ref 1–2.1)
ALT SERPL-CCNC: 34 U/L (ref 21–72)
AST SERPL-CCNC: 45 U/L (ref 17–59)
BUN SERPL-MCNC: 19 MG/DL (ref 9–20)
CALCIUM SERPL-MCNC: 9.3 MG/DL (ref 8.4–10.2)
GFR NON-AFRICAN AMERICAN: > 60

## 2018-04-08 RX ADMIN — INSULIN DETEMIR SCH U: 100 INJECTION, SOLUTION SUBCUTANEOUS at 22:11

## 2018-04-08 RX ADMIN — PANTOPRAZOLE SODIUM SCH MG: 40 TABLET, DELAYED RELEASE ORAL at 08:51

## 2018-04-08 RX ADMIN — POTASSIUM CHLORIDE SCH MEQ: 20 TABLET, EXTENDED RELEASE ORAL at 08:45

## 2018-04-08 RX ADMIN — INSULIN LISPRO SCH UNITS: 100 INJECTION, SOLUTION INTRAVENOUS; SUBCUTANEOUS at 22:08

## 2018-04-08 RX ADMIN — WHITE PETROLATUM SCH PKT: 1 OINTMENT TOPICAL at 08:52

## 2018-04-08 RX ADMIN — COLLAGENASE SANTYL SCH APPLIC: 250 OINTMENT TOPICAL at 08:51

## 2018-04-08 NOTE — CP.PCM.CON
History of Present Illness





- History of Present Illness


History of Present Illness: 





67 year old male with medical history of diabetes and chronic leg ulcers, 

readmitted to TCU for severe recurrent infection left foot  








Recently presented  to the emergency department from TCU for an evaluation of 

shortness of breath with decreasing O2 saturation and fluid retention during 

physical therapy treatments. Patient was recently diagnosed with CHF and has 

not been able to maintain proper oxygen levels. He was last given Lasix 40mg PO 

at 0030 earlier this morning. 








66 y/o M with PMhx of IDDM, s/p pancreatic resection was  brought to ED with 

AMS after being found by brother at home laying in the floor naked. Patient was 

admitted to ICU with severe DKA, AMS, ERIC< Sepsis and infected  L/foot ulcer . 

MRI showed severe cellulitis/ myositis but no definite OM  Is being treatewd 

for severe SSTI with myositis left foot for 3 weeks of IV rx 





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 











Review of Systems





- Constitutional


Constitutional: As Per HPI





- EENT


Eyes: absent: As Per HPI, Blind Spots, Blurred Vision, Change in Vision, 

Decreased Night Vision, Diplopia, Discharge, Dry Eye, Exophthalmos, Floaters, 

Irritation, Itchy Eyes, Loss of Peripheral Vision, Pain, Photophobia, Requires 

Corrective Lenses, Sees Flashes, Spots in Vision, Tunnel Vision, Other Visual 

Disturbances, Loss of Vision, Other


Ears: absent: As Per HPI, Decreased Hearing, Ear Discharge, Ear Pain, Tinnitus, 

Abnormal Hearing, Disequilibrium, Dizziness, Other


Nose/Mouth/Throat: absent: As Per HPI, Epistaxis, Nasal Congestion, Nasal 

Discharge, Nasal Obstruction, Nasal Trauma, Nose Pain, Post Nasal Drip, Sinus 

Pain, Sinus Pressure, Bleeding Gums, Change in Voice, Dental Pain, Dry Mouth, 

Dysphagia, Halitosis, Hoarsness, Lip Swelling, Mouth Lesions, Mouth Pain, 

Odynophagia, Sore Throat, Throat Swelling, Tongue Swelling, Facial Pain, Neck 

Pain, Neck Mass, Other





- Cardiovascular


Cardiovascular: As Per HPI





- Respiratory


Respiratory: absent: As Per HPI, Cough, Dyspnea, Hemoptysis, Dyspnea on Exertion

, Wheezing, Snoring, Stridor, Pain on Inspiration, Chest Congestion, Excessive 

Mucous Production, Change in Mucous Color, Pain with Coughing, Other





- Gastrointestinal


Gastrointestinal: As Per HPI





- Genitourinary


Genitourinary: absent: As Per HPI, Change in Urinary Stream, Difficulty 

Urinating, Dysuria, Flank Pain, Hematuria, Pyuria, Nocturia, Urinary 

Incontinence, Urinary Frequency, Urinary Hesitance, Urinary Urgency, Voiding 

Freq/Small Amts, Freq UTI, Hx Renal/Bladder Calculi, Hx /Renal Surgery, 

Bladder Distension, Other





- Musculoskeletal


Musculoskeletal: As Per HPI





- Integumentary


Integumentary: As Per HPI, Dry Skin, Skin Pain, Wounds





- Neurological


Neurological: As Per HPI





- Psychiatric


Psychiatric: absent: As Per HPI, Abnormal Sleep Pattern, Anhedonia, Anxiety, 

Auditory Hallucinations, Behavioral Changes, Change in Appetite, Change in 

Libido, Confusion, Depression, Difficulty Concentrating, Hallucinations, 

Homicidal Ideation, Hopelessness, Irritability, Memory Loss, Mood Swings, Panic 

Attacks, Paranoia, Suicidal Ideation, Visual Hallucinations, Tactile 

Hallucinations, Other





- Endocrine


Endocrine: absent: As Per HPI, Change in Body Appearance, Change in Libido, 

Cold Intolorance, Deepening of Voice, Excessive Sweating, Fatigue, Flushing, 

Heat Intolorance, Increase in Ring/Shoe/Hat Size, Palpitations, Polydipsia, 

Polyphagia, Polyuria, Other





- Hematologic/Lymphatic


Hematologic: absent: As Per HPI, Easy Bleeding, Easy Bruising, Lymphadenopathy, 

Other








Past Patient History





- Past Medical History & Family History


Past Medical History?: Yes





- Past Social History


Smoking Status: Never Smoked





- CARDIAC


Hx Cardiac Disorders: Yes


Hx Congestive Heart Failure: Yes


Other/Comment: cardiac cath with stent 4/2018





- PULMONARY


Hx Respiratory Disorders: No





- NEUROLOGICAL


Hx Neurological Disorder: No





- HEENT


Hx HEENT Problems: No





- RENAL


Hx Chronic Kidney Disease: No





- ENDOCRINE/METABOLIC


Hx Endocrine Disorders: Yes


Hx Diabetes Mellitus Type 2: Yes


Other/Comment: chronic LLE ulcer I&D on 4/4/18.  DKA





- HEMATOLOGICAL/ONCOLOGICAL


Hx Blood Disorders: No





- INTEGUMENTARY


Hx Dermatological Problems: No


Other/Comment: abcess  chronic diabetic ulcer





- MUSCULOSKELETAL/RHEUMATOLOGICAL


Hx Musculoskeletal Disorders: No


Hx Falls: No





- GASTROINTESTINAL


Hx Gastrointestinal Disorders: Yes


Hx Pancreatitis: Yes





- GENITOURINARY/GYNECOLOGICAL


Hx Genitourinary Disorders: No





- PSYCHIATRIC


Hx Psychophysiologic Disorder: No


Hx Substance Use: No





- SURGICAL HISTORY


Hx Surgeries: Yes


Other/Comment: removal of pancreas I&D LLE





- ANESTHESIA


Hx Anesthesia: Yes


Hx Anesthesia Reactions: No


Hx Malignant Hyperthermia: No





Meds


Allergies/Adverse Reactions: 


 Allergies











Allergy/AdvReac Type Severity Reaction Status Date / Time


 


No Known Allergies Allergy   Verified 03/30/18 06:46














- Medications


Medications: 


 Current Medications





Acetaminophen (Tylenol 325mg Tab)  650 mg PO Q4 PRN


   PRN Reason: Fever >100.4 F


Atorvastatin Calcium (Lipitor)  40 mg PO DAILY@2100 [facility]


   Last Admin: 04/07/18 22:11 Dose:  40 mg


Collagenase (Santyl)  1 applic TOP DAILY [facility]


   Last Admin: 04/08/18 08:51 Dose:  1 applic


Dextrose (Dextrose 50% Inj)  50 ml IVP ONCE PRN


   PRN Reason: Hypoglycemia


Dextrose (Glutose 15)  15 gm PO ONCE PRN


   PRN Reason: Hypoglycemia


Emollient Ointment (Vaseline Oint)  1 pkt TOP DAILY [facility]


   Last Admin: 04/08/18 08:52 Dose:  1 pkt


Ferrous Sulfate (Feosol)  325 mg PO DAILY [facility]


   Last Admin: 04/08/18 10:13 Dose:  325 mg


Gabapentin (Neurontin)  400 mg PO HS [facility]


   Last Admin: 04/07/18 23:00 Dose:  400 mg


Glipizide (Glucotrol)  10 mg PO ACBD [facility]


   Last Admin: 04/08/18 08:47 Dose:  10 mg


Glucagon (Glucagen Diagnostic Kit)  1 mg IM STAT PRN


   PRN Reason: Hypoglycemia


Vancomycin HCl 1 gm/ Sodium (Chloride)  250 mls @ 166.667 mls/hr IVPB DAILY@

1700 [facility]


   PRN Reason: Protocol


   Last Admin: 04/07/18 16:32 Dose:  166.667 mls/hr


Insulin Detemir (Levemir)  20 units SC [facility]


   Last Admin: 04/07/18 22:09 Dose:  20 u


Losartan Potassium (Cozaar)  25 mg PO DAILY [facility]


   Last Admin: 04/08/18 08:49 Dose:  25 mg


Pantoprazole Sodium (Protonix Ec Tab)  40 mg PO DAILY [facility]


   Last Admin: 04/08/18 08:51 Dose:  40 mg


Potassium Chloride (K-Dur 20 Meq Er Tab)  20 meq PO DAILY [facility]


   Last Admin: 04/08/18 08:45 Dose:  20 meq


Sitagliptin Phosphate (Januvia)  100 mg PO DAILY@0800 [facility]


   Last Admin: 04/08/18 08:45 Dose:  100 mg


Temazepam (Restoril)  15 mg PO HS PRN


   PRN Reason: Insomnia


Ticagrelor (Brilinta)  90 mg PO BID [facility]


   Last Admin: 04/08/18 08:45 Dose:  90 mg


Tramadol HCl (Ultram)  50 mg PO Q6 PRN


   PRN Reason: Pain, moderate (4-7)


   Last Admin: 04/08/18 08:52 Dose:  50 mg











Physical Exam





- Constitutional


Appears: Non-toxic, Cachectic





- Head Exam


Head Exam: ATRAUMATIC, NORMOCEPHALIC





- Eye Exam


Eye Exam: PERRL.  absent: Scleral icterus





- ENT Exam


ENT Exam: Mucous Membranes Dry, Normal Oropharynx





- Neck Exam


Neck exam: Negative for: Lymphadenopathy





- Respiratory Exam


Respiratory Exam: Clear to Auscultation Bilateral, NORMAL BREATHING PATTERN





- Cardiovascular Exam


Cardiovascular Exam: REGULAR RHYTHM, +S1, +S2





- GI/Abdominal Exam


GI & Abdominal Exam: Diminished Bowel Sounds, Soft.  absent: Tenderness





- Rectal Exam


Rectal Exam: Deferred





-  Exam


 Exam: NORMAL INSPECTION





- Extremities Exam


Extremities exam: Positive for: pedal edema


Additional comments: 





left foot eschar less inflamed  still some superficial slough  no abran pus





- Back Exam


Back exam: absent: CVA tenderness (L), CVA tenderness (R), paraspinal tenderness





- Neurological Exam


Neurological exam: Alert, CN II-XII Intact, Normal Gait, Oriented x3, Reflexes 

Normal





- Psychiatric Exam


Psychiatric exam: Normal Affect





- Skin


Skin Exam: Dry





Results





- Vital Signs


Recent Vital Signs: 


 Last Vital Signs











Temp  97.2 F L  04/08/18 08:15


 


Pulse  85   04/08/18 08:49


 


Resp  20   04/08/18 08:15


 


BP  103/60   04/08/18 08:49


 


Pulse Ox  97   04/08/18 08:15














- Labs


Result Diagrams: 


 04/06/18 05:55





 04/08/18 06:20


Labs: 


 Laboratory Results - last 24 hr











  04/07/18 04/07/18 04/07/18





  14:35 16:18 21:29


 


Sodium   


 


Potassium   


 


Chloride   


 


Carbon Dioxide   


 


Anion Gap   


 


BUN   


 


Creatinine   


 


Est GFR ( Amer)   


 


Est GFR (Non-Af Amer)   


 


POC Glucose (mg/dL)  193 H  219 H  356 H


 


Random Glucose   


 


Calcium   


 


Total Bilirubin   


 


AST   


 


ALT   


 


Alkaline Phosphatase   


 


Total Protein   


 


Albumin   


 


Globulin   


 


Albumin/Globulin Ratio   














  04/08/18 04/08/18 04/08/18





  03:14 05:11 06:20


 


Sodium    140


 


Potassium    4.0


 


Chloride    101


 


Carbon Dioxide    27


 


Anion Gap    16


 


BUN    19


 


Creatinine    0.9


 


Est GFR ( Amer)    > 60


 


Est GFR (Non-Af Amer)    > 60


 


POC Glucose (mg/dL)  262 H  186 H 


 


Random Glucose    147 H


 


Calcium    9.3


 


Total Bilirubin    0.5


 


AST    45


 


ALT    34


 


Alkaline Phosphatase    95


 


Total Protein    7.9


 


Albumin    3.3 L


 


Globulin    4.6 H


 


Albumin/Globulin Ratio    0.7 L














  04/08/18





  11:07


 


Sodium 


 


Potassium 


 


Chloride 


 


Carbon Dioxide 


 


Anion Gap 


 


BUN 


 


Creatinine 


 


Est GFR ( Amer) 


 


Est GFR (Non-Af Amer) 


 


POC Glucose (mg/dL)  203 H


 


Random Glucose 


 


Calcium 


 


Total Bilirubin 


 


AST 


 


ALT 


 


Alkaline Phosphatase 


 


Total Protein 


 


Albumin 


 


Globulin 


 


Albumin/Globulin Ratio 














Assessment & Plan


(1) Abscess


Status: Acute   





(2) CHF (congestive heart failure)


Status: Acute   





(3) Chronic ulcer of leg


Status: Chronic   





(4) Diabetes mellitus type 2 in nonobese


Status: Chronic   





(5) DKA (diabetic ketoacidoses)


Status: Resolved   





- Assessment and Plan (Free Text)


Assessment: 





slowly improving on IV antibiotics    cont same


Plan: 





consider repeat MRI left foot


cont iv rx

## 2018-04-08 NOTE — CP.PCM.PN
Subjective





- Date & Time of Evaluation


Date of Evaluation: 04/08/18


Time of Evaluation: 14:21





- Subjective


Subjective: 





Podiatry Progress Note - Dr. Ascencio





67M seen and evaluated at bedside in TCU for left foot necrotic wound. Patient 

resting comfortably, hemodynamically stable and NAD. NAEON. Currently denies 

any pain in his left foot. Dressing to left foot not present. Denies N/V/F/D/C/

SOB/HA/dizziness.





Objective





- Vital Signs/Intake and Output


Vital Signs (last 24 hours): 


 











Temp Pulse Resp BP Pulse Ox


 


 97.2 F L  85   20   103/60   97 


 


 04/08/18 08:15  04/08/18 08:49  04/08/18 08:15  04/08/18 08:49  04/08/18 08:15











- Medications


Medications: 


 Current Medications





Acetaminophen (Tylenol 325mg Tab)  650 mg PO Q4 PRN


   PRN Reason: Fever >100.4 F


Atorvastatin Calcium (Lipitor)  40 mg PO DAILY@2100 [facility]


   Last Admin: 04/07/18 22:11 Dose:  40 mg


Collagenase (Santyl)  1 applic TOP DAILY [facility]


   Last Admin: 04/08/18 08:51 Dose:  1 applic


Dextrose (Dextrose 50% Inj)  50 ml IVP ONCE PRN


   PRN Reason: Hypoglycemia


Dextrose (Glutose 15)  15 gm PO ONCE PRN


   PRN Reason: Hypoglycemia


Emollient Ointment (Vaseline Oint)  1 pkt TOP DAILY [facility]


   Last Admin: 04/08/18 08:52 Dose:  1 pkt


Ferrous Sulfate (Feosol)  325 mg PO DAILY [facility]


   Last Admin: 04/08/18 10:13 Dose:  325 mg


Gabapentin (Neurontin)  400 mg PO HS [facility]


   Last Admin: 04/07/18 23:00 Dose:  400 mg


Glipizide (Glucotrol)  10 mg PO ACBD [facility]


   Last Admin: 04/08/18 08:47 Dose:  10 mg


Glucagon (Glucagen Diagnostic Kit)  1 mg IM STAT PRN


   PRN Reason: Hypoglycemia


Vancomycin HCl 1 gm/ Sodium (Chloride)  250 mls @ 166.667 mls/hr IVPB DAILY@

1700 [facility]


   PRN Reason: Protocol


   Last Admin: 04/07/18 16:32 Dose:  166.667 mls/hr


Insulin Detemir (Levemir)  20 units SC [facility]


   Last Admin: 04/07/18 22:09 Dose:  20 u


Losartan Potassium (Cozaar)  25 mg PO DAILY [facility]


   Last Admin: 04/08/18 08:49 Dose:  25 mg


Pantoprazole Sodium (Protonix Ec Tab)  40 mg PO DAILY [facility]


   Last Admin: 04/08/18 08:51 Dose:  40 mg


Potassium Chloride (K-Dur 20 Meq Er Tab)  20 meq PO DAILY [facility]


   Last Admin: 04/08/18 08:45 Dose:  20 meq


Sitagliptin Phosphate (Januvia)  100 mg PO DAILY@0800 [facility]


   Last Admin: 04/08/18 08:45 Dose:  100 mg


Temazepam (Restoril)  15 mg PO HS PRN


   PRN Reason: Insomnia


Ticagrelor (Brilinta)  90 mg PO BID [facility]


   Last Admin: 04/08/18 08:45 Dose:  90 mg


Tramadol HCl (Ultram)  50 mg PO Q6 PRN


   PRN Reason: Pain, moderate (4-7)


   Last Admin: 04/08/18 08:52 Dose:  50 mg











- Labs


Labs: 


 





 04/06/18 05:55 





 04/08/18 06:20 











- Constitutional


Appears: Well, Non-toxic, No Acute Distress





- Extremities Exam


Additional comments: 





VASC: LLE DP and PT pulses nonpalpable. RLE DP pulse weakly palpable 1/4, PT 

nonpalpable. CFT <3 seconds to all digits x10. Pedal hair growth present. Mild 

nonpitting edema noted to left foot resolving. Temperature gradient warm to 

warm B/L


NEURO: Protective and gross sensation diminished


DERM: Fibro-necrotic eschar located on the entire dorsolateral foot wit 

periwound skin erosion and detachment noted. Periwound erythem anoted. Mild 

fluctuance noted to plantar lateral aspect near 5th met base as well as to 

plantar medial arch. No drainage able to be expressed this visit. Skin 

sloughing noted to entirety of wound borders.


ORTHO: no tenderness on palpation of left foot ulceration








- Neurological Exam


Neurological Exam: Alert, Awake, Oriented x3





- Psychiatric Exam


Psychiatric exam: Normal Affect, Normal Mood





Assessment and Plan





- Assessment and Plan (Free Text)


Assessment: 





67 year male with left foot ulceration with underlying abscess


Plan: 





Patient seen and evaluated at bedside


Plan discussed with attending, Dr. Ascencio


Afebrile


Left foot XR (4/5/18): Negative for OM, ST swelling dorsal and plantar aspects 

of left foot


Foot MRI 3/30 shows abscess of left foot


Repeat MRI performed 4/6/18, awaiting final report


Wound cx (+) for growth of Candida Parasilosis


Continue IV abx per ID - Vancomycin 1g IV


Pain regimen per medicine


No surgical intervention at this time


Continue PT/OT - patient may be FWB LLE in surgical shoe


Podiatry will continue to follow

## 2018-04-09 LAB
ALBUMIN SERPL-MCNC: 3.5 G/DL (ref 3.5–5)
ALBUMIN/GLOB SERPL: 0.7 {RATIO} (ref 1–2.1)
ALT SERPL-CCNC: 31 U/L (ref 21–72)
AST SERPL-CCNC: 45 U/L (ref 17–59)
BASOPHILS # BLD AUTO: 0.1 K/UL (ref 0–0.2)
BASOPHILS NFR BLD: 0.6 % (ref 0–2)
BUN SERPL-MCNC: 22 MG/DL (ref 9–20)
CALCIUM SERPL-MCNC: 9.8 MG/DL (ref 8.4–10.2)
EOSINOPHIL # BLD AUTO: 2.4 K/UL (ref 0–0.7)
EOSINOPHIL NFR BLD: 18.2 % (ref 0–4)
ERYTHROCYTE [DISTWIDTH] IN BLOOD BY AUTOMATED COUNT: 17.4 % (ref 11.5–14.5)
GFR NON-AFRICAN AMERICAN: > 60
HGB BLD-MCNC: 10.1 G/DL (ref 12–18)
LYMPHOCYTES # BLD AUTO: 2.2 K/UL (ref 1–4.3)
LYMPHOCYTES NFR BLD AUTO: 16.7 % (ref 20–40)
MCH RBC QN AUTO: 26.9 PG (ref 27–31)
MCHC RBC AUTO-ENTMCNC: 32.5 G/DL (ref 33–37)
MCV RBC AUTO: 82.7 FL (ref 80–94)
MONOCYTES # BLD: 1.4 K/UL (ref 0–0.8)
MONOCYTES NFR BLD: 10.8 % (ref 0–10)
NEUTROPHILS # BLD: 7.2 K/UL (ref 1.8–7)
NEUTROPHILS NFR BLD AUTO: 53.7 % (ref 50–75)
NRBC BLD AUTO-RTO: 0.2 % (ref 0–0)
PLATELET # BLD: 476 K/UL (ref 130–400)
PMV BLD AUTO: 11.2 FL (ref 7.2–11.7)
RBC # BLD AUTO: 3.75 MIL/UL (ref 4.4–5.9)
WBC # BLD AUTO: 13.3 K/UL (ref 4.8–10.8)

## 2018-04-09 RX ADMIN — POTASSIUM CHLORIDE SCH MEQ: 20 TABLET, EXTENDED RELEASE ORAL at 09:06

## 2018-04-09 RX ADMIN — WHITE PETROLATUM SCH PKT: 1 OINTMENT TOPICAL at 09:06

## 2018-04-09 RX ADMIN — INSULIN DETEMIR SCH U: 100 INJECTION, SOLUTION SUBCUTANEOUS at 22:09

## 2018-04-09 RX ADMIN — COLLAGENASE SANTYL SCH APPLIC: 250 OINTMENT TOPICAL at 10:07

## 2018-04-09 RX ADMIN — INSULIN LISPRO SCH: 100 INJECTION, SOLUTION INTRAVENOUS; SUBCUTANEOUS at 06:53

## 2018-04-09 RX ADMIN — INSULIN LISPRO SCH UNITS: 100 INJECTION, SOLUTION INTRAVENOUS; SUBCUTANEOUS at 22:07

## 2018-04-09 RX ADMIN — PANTOPRAZOLE SODIUM SCH MG: 40 TABLET, DELAYED RELEASE ORAL at 09:06

## 2018-04-09 RX ADMIN — INSULIN LISPRO SCH UNITS: 100 INJECTION, SOLUTION INTRAVENOUS; SUBCUTANEOUS at 16:15

## 2018-04-09 RX ADMIN — INSULIN LISPRO SCH: 100 INJECTION, SOLUTION INTRAVENOUS; SUBCUTANEOUS at 12:01

## 2018-04-09 NOTE — CP.PCM.PN
Subjective





- Date & Time of Evaluation


Date of Evaluation: 04/09/18


Time of Evaluation: 11:41





- Subjective


Subjective: 


66 y/o male seen and examined with attending Dr. Ascencio for left dorsolateral 

foot abscess. Pt resting comfortably in bed in NAD. States he is having some 

pain on the outside of the foot, but it is mild. Otherwise denies F/C/N/V/CP/

SOB.





Objective





- Vital Signs/Intake and Output


Vital Signs (last 24 hours): 


 











Temp Pulse Resp BP Pulse Ox


 


 97.9 F   84   20   110/58 L  98 


 


 04/09/18 08:01  04/09/18 09:04  04/09/18 08:01  04/09/18 09:04  04/09/18 08:01











- Medications


Medications: 


 Current Medications





Acetaminophen (Tylenol 325mg Tab)  650 mg PO Q4 PRN


   PRN Reason: Fever >100.4 F


Atorvastatin Calcium (Lipitor)  40 mg PO DAILY@2100 [facility]


   Last Admin: 04/08/18 22:12 Dose:  40 mg


Collagenase (Santyl)  1 applic TOP DAILY [facility]


   Last Admin: 04/09/18 10:07 Dose:  1 applic


Dextrose (Dextrose 50% Inj)  50 ml IVP ONCE PRN


   PRN Reason: Hypoglycemia


Dextrose (Glutose 15)  15 gm PO ONCE PRN


   PRN Reason: Hypoglycemia


Emollient Ointment (Vaseline Oint)  1 pkt TOP DAILY [facility]


   Last Admin: 04/09/18 09:06 Dose:  1 pkt


Ferrous Sulfate (Feosol)  325 mg PO DAILY [facility]


   Last Admin: 04/09/18 09:16 Dose:  325 mg


Gabapentin (Neurontin)  400 mg PO [facility]


   Last Admin: 04/08/18 22:12 Dose:  400 mg


Glipizide (Glucotrol)  10 mg PO ACBD [facility]


   Last Admin: 04/09/18 06:53 Dose:  10 mg


Glucagon (Glucagen Diagnostic Kit)  1 mg IM STAT PRN


   PRN Reason: Hypoglycemia


Vancomycin HCl 1 gm/ Sodium (Chloride)  250 mls @ 166.667 mls/hr IVPB DAILY@

1700 [facility]


   PRN Reason: Protocol


   Last Admin: 04/08/18 16:31 Dose:  166.667 mls/hr


Insulin Detemir (Levemir)  20 units SC [facility]


   Last Admin: 04/08/18 22:11 Dose:  20 u


Insulin Human Lispro (Humalog)  0 units SC [facility]


   Last Admin: 04/09/18 06:53 Dose:  Not Given


Losartan Potassium (Cozaar)  25 mg PO DAILY [facility]


   Last Admin: 04/09/18 09:04 Dose:  25 mg


Pantoprazole Sodium (Protonix Ec Tab)  40 mg PO DAILY [facility]


   Last Admin: 04/09/18 09:06 Dose:  40 mg


Potassium Chloride (K-Dur 20 Meq Er Tab)  20 meq PO DAILY [facility]


   Last Admin: 04/09/18 09:06 Dose:  20 meq


Sitagliptin Phosphate (Januvia)  100 mg PO DAILY@0800 [facility]


   Last Admin: 04/09/18 09:06 Dose:  100 mg


Temazepam (Restoril)  15 mg PO HS PRN


   PRN Reason: Insomnia


Ticagrelor (Brilinta)  90 mg PO BID [facility]


   Last Admin: 04/09/18 09:03 Dose:  90 mg


Tramadol HCl (Ultram)  50 mg PO Q6 PRN


   PRN Reason: Pain, moderate (4-7)


   Last Admin: 04/08/18 08:52 Dose:  50 mg











- Labs


Labs: 


 





 04/09/18 05:30 





 04/09/18 05:30 











- Constitutional


Appears: Well, Non-toxic, No Acute Distress





- Extremities Exam


Additional comments: 


VASC: LLE DP and PT pulses nonpalpable. RLE DP pulse weakly palpable 1/4, PT 

nonpalpable. CFT <3 seconds to all digits x10. Pedal hair growth present. Mild 

nonpitting edema noted to left foot resolving. Temperature gradient warm to 

warm B/L


NEURO: Protective and gross sensation diminished


DERM: Fibro-necrotic eschar located on the entire dorsolateral foot wit 

periwound skin erosion and detachment noted. Periwound erythema noted. Mild 

fluctuance noted to plantar lateral aspect near 5th met base as well as to 

plantar medial arch. No drainage able to be expressed this visit. Skin 

sloughing noted to entirety of wound borders.


ORTHO: no tenderness on palpation of left foot ulceration














- Neurological Exam


Neurological Exam: Alert, Awake, Oriented x3





- Psychiatric Exam


Psychiatric exam: Normal Affect, Normal Mood





Assessment and Plan





- Assessment and Plan (Free Text)


Assessment: 


67 year male with left foot ulceration with underlying abscess





Plan: 


Patient seen and evaluated at bedsidewith attending, Dr. Ascencio


Afebrile, WBC 13.3


Left foot XR (4/5/18): Negative for OM, ST swelling dorsal and plantar aspects 

of left foot


Foot MRI 3/30 shows abscess of left foot


Repeat MRI performed 4/6/18, awaiting final report


Wound cx (+) for growth of Candida Parasilosis


Continue IV abx per ID - Vancomycin 1g IV


Pain regimen per medicine


Plan to take patient to the OR on Friday for wound debridement with Versajet


Medical and cardiac clearance requested at this time


Continue PT/OT - patient may be full WB to the LLE in surgical shoe


Podiatry will continue to follow while in house

## 2018-04-09 NOTE — PN
ENDOCRINOLOGY FOLLOWUP NOTE



DATE:



LOCATION:  In room [address] [place] [address]



SUBJECTIVE:  This is a 67-year-old male with recent uncontrolled type 2

insulin-requiring diabetes with extremes of glycemic fluctuations because

of the variability of his oral intake and is now being followed closely for

metabolic management.  His glucose values are fluctuating and the latest

glucose levels today are improved and have ranged from 189 to 193 mg/dL. 

However, it was quite elevated at 345 at bedtime last night as noted.  His

latest chemistry showed a BUN of 22, sodium 141, potassium 5.0, chloride

101, CO2 of 27, glucose 151, and creatinine 1.1.  So, at this time, we will

continue the modified insulin regimen with Levemir given as 20 units

subcutaneously at bedtime daily as ordered.  We will continue the glipizide

given as 10 mg b.i.d. and Januvia at 100 mg once daily as ordered.  We also

gave him a very low-dose correction scale using Humalog insulin as ordered.

We will obtain serial chemistries and supplement accordingly as needed.  We

will follow.





__________________________________________

Bernie Collins MD





DD:  04/09/2018 20:22:09

DT:  04/09/2018 20:23:32

Job # [id number]

## 2018-04-09 NOTE — PN
ENDOCRINOLOGY FOLLOWUP NOTE



DATE:  04/08/2018 11:43



LOCATION:  Room [address] [place] [address].



SUBJECTIVE:  This is a 67-year-old male with recent uncontrolled type 2

insulin-requiring diabetes now being followed closely for metabolic

management.  His glucose levels are fluctuating but improved also because

of the variability of his oral intake as noted thereof.  His glucose levels

overnight have ranged from 147-186 and 203 mg/dL which is much improved as

noted.  His latest chemistry showed a BUN of 19, sodium 140, potassium 4.0,

chloride 101, CO2 of 27, glucose 147 and creatinine 0.9.  So at this time,

we will continue the same oral hypoglycemic combination of Januvia 100 mg

daily with glipizide at 10 mg b.i.d. before meals as ordered.  We will also

continue the same basal insulin given as Levemir at 20 units subcu at

bedtime daily as given.  We will titrate incrementally as indicated to

optimize metabolic control.  We will follow and advise accordingly.







__________________________________________

Bernie Collins MD





DD:  04/08/2018 11:43:28

DT:  04/08/2018 11:45:56

Job # [id number]

## 2018-04-10 RX ADMIN — INSULIN LISPRO SCH UNITS: 100 INJECTION, SOLUTION INTRAVENOUS; SUBCUTANEOUS at 16:26

## 2018-04-10 RX ADMIN — PANTOPRAZOLE SODIUM SCH MG: 40 TABLET, DELAYED RELEASE ORAL at 08:01

## 2018-04-10 RX ADMIN — INSULIN DETEMIR SCH U: 100 INJECTION, SOLUTION SUBCUTANEOUS at 22:01

## 2018-04-10 RX ADMIN — INSULIN LISPRO SCH: 100 INJECTION, SOLUTION INTRAVENOUS; SUBCUTANEOUS at 16:28

## 2018-04-10 RX ADMIN — INSULIN LISPRO SCH: 100 INJECTION, SOLUTION INTRAVENOUS; SUBCUTANEOUS at 07:58

## 2018-04-10 RX ADMIN — INSULIN LISPRO SCH: 100 INJECTION, SOLUTION INTRAVENOUS; SUBCUTANEOUS at 11:37

## 2018-04-10 RX ADMIN — INSULIN LISPRO SCH: 100 INJECTION, SOLUTION INTRAVENOUS; SUBCUTANEOUS at 22:00

## 2018-04-10 RX ADMIN — POTASSIUM CHLORIDE SCH MEQ: 20 TABLET, EXTENDED RELEASE ORAL at 08:01

## 2018-04-10 RX ADMIN — COLLAGENASE SANTYL SCH APPLIC: 250 OINTMENT TOPICAL at 12:23

## 2018-04-10 RX ADMIN — WHITE PETROLATUM SCH: 1 OINTMENT TOPICAL at 12:57

## 2018-04-10 NOTE — CP.PCM.PN
Subjective





- Date & Time of Evaluation


Date of Evaluation: 04/10/18


Time of Evaluation: 08:00





- Subjective


Subjective: 





MRI shows possible OM


to arrange for IV antibiotic at [facility] 





Objective





- Vital Signs/Intake and Output


Vital Signs (last 24 hours): 


 











Temp Pulse Resp BP Pulse Ox


 


 98.2 F   79   20   101/52 L  99 


 


 04/10/18 08:05  04/10/18 08:05  04/10/18 08:05  04/10/18 08:05  04/10/18 08:05











- Medications


Medications: 


 Current Medications





Acetaminophen (Tylenol 325mg Tab)  650 mg PO Q4 PRN


   PRN Reason: Fever >100.4 F


Atorvastatin Calcium (Lipitor)  40 mg PO DAILY@2100 [facility]


   Last Admin: 04/09/18 22:10 Dose:  40 mg


Collagenase (Santyl)  1 applic TOP DAILY [facility]


   Last Admin: 04/09/18 10:07 Dose:  1 applic


Dextrose (Dextrose 50% Inj)  50 ml IVP ONCE PRN


   PRN Reason: Hypoglycemia


Dextrose (Glutose 15)  15 gm PO ONCE PRN


   PRN Reason: Hypoglycemia


Emollient Ointment (Vaseline Oint)  1 pkt TOP DAILY [facility]


   Last Admin: 04/09/18 09:06 Dose:  1 pkt


Ferrous Sulfate (Feosol)  325 mg PO DAILY [facility]


   Last Admin: 04/10/18 08:04 Dose:  325 mg


Gabapentin (Neurontin)  400 mg PO HS [facility]


   Last Admin: 04/09/18 22:10 Dose:  400 mg


Glipizide (Glucotrol)  10 mg PO ACBD [facility]


   Last Admin: 04/10/18 07:59 Dose:  10 mg


Glucagon (Glucagen Diagnostic Kit)  1 mg IM STAT PRN


   PRN Reason: Hypoglycemia


Vancomycin HCl 1 gm/ Sodium (Chloride)  250 mls @ 166.667 mls/hr IVPB DAILY@

1700 [facility]


   PRN Reason: Protocol


   Last Admin: 04/09/18 16:59 Dose:  166.667 mls/hr


Insulin Detemir (Levemir)  20 units SC HS [facility]


   Last Admin: 04/09/18 22:09 Dose:  20 u


Insulin Human Lispro (Humalog)  0 units SC ACHS [facility]


   Last Admin: 04/10/18 07:58 Dose:  Not Given


Insulin Human Lispro (Humalog)  6 units SC ACD [facility]


Losartan Potassium (Cozaar)  25 mg PO DAILY [facility]


   Last Admin: 04/10/18 08:01 Dose:  25 mg


Pantoprazole Sodium (Protonix Ec Tab)  40 mg PO DAILY [facility]


   Last Admin: 04/10/18 08:01 Dose:  40 mg


Potassium Chloride (K-Dur 20 Meq Er Tab)  20 meq PO DAILY [facility]


   Last Admin: 04/10/18 08:01 Dose:  20 meq


Sitagliptin Phosphate (Januvia)  100 mg PO DAILY@0800 [facility]


   Last Admin: 04/10/18 08:01 Dose:  100 mg


Temazepam (Restoril)  15 mg PO HS PRN


   PRN Reason: Insomnia


Ticagrelor (Brilinta)  90 mg PO BID [facility]


   Last Admin: 04/10/18 08:01 Dose:  90 mg


Tramadol HCl (Ultram)  50 mg PO Q6 PRN


   PRN Reason: Pain, moderate (4-7)


   Last Admin: 04/09/18 22:17 Dose:  50 mg











- Labs


Labs: 


 





 04/09/18 05:30 





 04/09/18 05:30 











- Constitutional


Appears: Non-toxic, Chronically Ill





- Eye Exam


Eye Exam: PERRL





- ENT Exam


ENT Exam: Mucous Membranes Dry





- Neck Exam


Neck Exam: absent: Lymphadenopathy





- Respiratory Exam


Respiratory Exam: Decreased Breath Sounds





- Cardiovascular Exam


Cardiovascular Exam: REGULAR RHYTHM





- GI/Abdominal Exam


GI & Abdominal Exam: Distended





Assessment and Plan


(1) Abscess


Status: Acute   





(2) CHF (congestive heart failure)


Status: Acute   





(3) Chronic ulcer of leg


Status: Chronic   





(4) Diabetes mellitus type 2 in nonobese


Status: Chronic   





(5) DKA (diabetic ketoacidoses)


Status: Resolved

## 2018-04-10 NOTE — PN
ENDOCRINOLOGY FOLLOWUP NOTE



DATE:



LOCATION:  In room [facility].



SUBJECTIVE:  This is a 67-year-old male with recent uncontrolled type 2

insulin-requiring diabetes, now being followed closely for metabolic

management.  With the variability of his oral intake, we have been

observing that he has extremes of glycemic fluctuations as noted thereof. 

His latest glucose values have ranged from 158 to 166 mg/dL today as noted.

However, the glucose values were quite elevated last night, ranging from

359 to 382 mg/dL.  His latest chemistry showed a BUN of 20, sodium 141,

potassium 5.0, chloride 101, CO2 of 27, glucose 151, and creatinine 1.1. 

So, at this time once again modifies current regimen and add Humalog given

as 6 units a.c. dinner to start today as ordered.  We will continue the

basal insulin given as Levemir at 20 units subcutaneously at bedtime daily

as given.  We will titrate incremental as indicated to optimize metabolic

control.  We will also continue the dual oral hypoglycemic therapy given as

glipizide at 10 mg b.i.d. and Januvia at 100 mg once daily as ordered.  We

will titrate incremental as indicated to optimize metabolic control.  We

will follow and advise accordingly.





__________________________________________

Bernie Collins MD





DD:  04/10/2018 17:49:22

DT:  04/10/2018 17:52:01

Job # [id number]

## 2018-04-10 NOTE — CP.PCM.PN
Subjective





- Date & Time of Evaluation


Date of Evaluation: 04/10/18


Time of Evaluation: 16:27





- Subjective


Subjective: 


66 y/o male seen at bedside this afternoon for left dorsolateral foot abscess. 

Pt resting in bedside chair at time of visit in NAD. Denies any events 

overnight. Says he feels fine but has a little pain in the foot. Says he was 

given meds that control the pain well. Denies F/C/N/V/CP/SOB








Objective





- Vital Signs/Intake and Output


Vital Signs (last 24 hours): 


 











Temp Pulse Resp BP Pulse Ox


 


 98.2 F   79   20   101/52 L  99 


 


 04/10/18 08:05  04/10/18 08:05  04/10/18 08:05  04/10/18 08:05  04/10/18 08:05











- Medications


Medications: 


 Current Medications





Acetaminophen (Tylenol 325mg Tab)  650 mg PO Q4 PRN


   PRN Reason: Fever >100.4 F


Atorvastatin Calcium (Lipitor)  40 mg PO DAILY@2100 [facility]


   Last Admin: 04/09/18 22:10 Dose:  40 mg


Collagenase (Santyl)  1 applic TOP DAILY [facility]


   Last Admin: 04/10/18 12:23 Dose:  1 applic


Dextrose (Dextrose 50% Inj)  50 ml IVP ONCE PRN


   PRN Reason: Hypoglycemia


Dextrose (Glutose 15)  15 gm PO ONCE PRN


   PRN Reason: Hypoglycemia


Emollient Ointment (Vaseline Oint)  1 pkt TOP DAILY [facility]


   Last Admin: 04/10/18 12:57 Dose:  Not Given


Ferrous Sulfate (Feosol)  325 mg PO DAILY [facility]


   Last Admin: 04/10/18 08:04 Dose:  325 mg


Gabapentin (Neurontin)  400 mg PO HS [facility]


   Last Admin: 04/09/18 22:10 Dose:  400 mg


Glipizide (Glucotrol)  10 mg PO ACBD [facility]


   Last Admin: 04/10/18 16:22 Dose:  10 mg


Glucagon (Glucagen Diagnostic Kit)  1 mg IM STAT PRN


   PRN Reason: Hypoglycemia


Vancomycin HCl 1 gm/ Sodium (Chloride)  250 mls @ 166.667 mls/hr IVPB DAILY@

1700 [facility]


   PRN Reason: Protocol


   Last Admin: 04/10/18 16:21 Dose:  166.667 mls/hr


Cefazolin Sodium 1 gm/ Sodium (Chloride)  100 mls @ 100 mls/hr IVPB Q8@0500,1300

,2100 [facility]


   PRN Reason: Protocol


   Last Admin: 04/10/18 12:52 Dose:  100 mls/hr


Insulin Detemir (Levemir)  20 units SC HS [facility]


   Last Admin: 04/09/18 22:09 Dose:  20 u


Insulin Human Lispro (Humalog)  0 units SC ACHS [facility]


   Last Admin: 04/10/18 11:37 Dose:  Not Given


Insulin Human Lispro (Humalog)  6 units SC ACD [facility]


   Last Admin: 04/10/18 16:26 Dose:  6 units


Losartan Potassium (Cozaar)  25 mg PO DAILY [facility]


   Last Admin: 04/10/18 08:01 Dose:  25 mg


Pantoprazole Sodium (Protonix Ec Tab)  40 mg PO DAILY [facility]


   Last Admin: 04/10/18 08:01 Dose:  40 mg


Potassium Chloride (K-Dur 20 Meq Er Tab)  20 meq PO DAILY [facility]


   Last Admin: 04/10/18 08:01 Dose:  20 meq


Sitagliptin Phosphate (Januvia)  100 mg PO DAILY@0800 [facility]


   Last Admin: 04/10/18 08:01 Dose:  100 mg


Temazepam (Restoril)  15 mg PO HS PRN


   PRN Reason: Insomnia


Ticagrelor (Brilinta)  90 mg PO BID [facility]


   Last Admin: 04/10/18 16:22 Dose:  90 mg


Tramadol HCl (Ultram)  50 mg PO Q6 PRN


   PRN Reason: Pain, moderate (4-7)


   Last Admin: 04/10/18 12:21 Dose:  50 mg











- Labs


Labs: 


 





 04/09/18 05:30 





 04/09/18 05:30 











- Constitutional


Appears: Well, Non-toxic, No Acute Distress





- Extremities Exam


Additional comments: 


VASC: LLE DP and PT pulses nonpalpable. RLE DP pulse weakly palpable 1/4, PT 

nonpalpable. CFT <3 seconds to all digits x10. Pedal hair growth present. Mild 

nonpitting edema noted to left foot resolving. Temperature gradient warm to 

warm B/L


NEURO: Protective and gross sensation diminished


DERM: Fibro-necrotic eschar located on the entire dorsolateral foot wit 

periwound skin erosion and detachment noted. Periwound erythema noted. Mild 

fluctuance noted to plantar lateral aspect near 5th met base as well as to 

plantar medial arch. 2 cc of pus-tinged serous fluid elicited with pressure 

from proximal lateral aspect of abscess. 


ORTHO: no tenderness on palpation of left foot ulceration














- Neurological Exam


Neurological Exam: Alert, Awake, Oriented x3





- Psychiatric Exam


Psychiatric exam: Normal Affect, Normal Mood





Assessment and Plan





- Assessment and Plan (Free Text)


Assessment: 


67 year male with left foot ulceration with underlying abscess





Plan: 


Patient seen and evaluated at bedside


Discussed with attending, Dr. Ascencio


Left foot XR (4/5/18): Negative for OM, ST swelling dorsal and plantar aspects 

of left foot


Foot MRI 3/30 indicative of left foot abscess


Repeat MRI performed 4/6/18 shows patchy edema of cuboid, lateral cuneiform and 

bases 3rd-5th mets


Wound cx (+) for growth of Candida Parasilosis


Continue IV abx per ID - Vancomycin 1g IV


   -per ID, pt to receive 6 weeks IV abx due to likely OM of midfoot


Continue pain regimen per medicine


Continue PT/OT - patient may be full WB to the LLE in surgical shoe


Podiatry will continue to follow while in house

## 2018-04-10 NOTE — CATH
--------------- APPROVED REPORT --------------





Procedure(s) performed: Coronary Arteriogram

Left Heart Catheterization



HISTORY

: The patient is a 67 year-old MALE with a history of .



INDICATION

The indication(s) include : chest pain, abnormal ECG.



CASE TECHNIQUE

The patient was brought electively to the Cardiac Catheterization 

Laboratory in a fasting state and was prepped and draped in a sterile 

manner. The right femoral groin was infiltrated with 1% Lidocaine 

subcutaneous anesthesia. A sheath was inserted into the right femoral 

artery without difficulty. Coronary angiography was performed using 

coronary diagnostic catheters. The left coronary system was accessed 

and visualized with a Diagnostic catheter. The right coronary system 

was accessed and visualized with a Diagnostic catheter. The left 

ventricle was accessed and visualized with a Diagnostic catheter. 

Left ventricular/Aortic Valve gradient assessed on pullback. Left 

ventriculogram was performed in [place] projection. Pre-demployment 

femoral angiogram was performed . Closure device was deployed with a 

6 Fr Angioseal without any complications. The patient tolerated the 

procedure well and there were no complications associated with the 

procedure. 



The circumflex artery is a large size vessel with stenosis. There is 

a 85% stenosis in the mid segment. The first obtuse marginal branch 

is a small size vessel with stenosis. There is a 95% stenosis in the 

ostial segment. 



The right coronary artery is a small size vessel with stenosis. There 

is a 60% stenosis in the mid segment. The right posterior descending 

artery is a small size vessel with stenosis. There is a 90% stenosis 

in the distal segment. The right posterolateral branch is a small 

size vessel with intimal irregularities. 



Left Ventricle

The left ventricle is NML in size with NML contractility. The left 

ventricular ejection fraction is estimated to be 60%. There was no 

gradient across the aortic valve upon pullback.



Conclusion

Double Vessel CAD



Recommendations

Smoking Cessation

Daily ASA with Plavix for at least one year

Aggressive Medical Therapy

Weight Loss Reduction Program

TRANSFER FOR PCI

## 2018-04-11 VITALS — OXYGEN SATURATION: 100 % | RESPIRATION RATE: 19 BRPM

## 2018-04-11 RX ADMIN — COLLAGENASE SANTYL SCH APPLIC: 250 OINTMENT TOPICAL at 08:05

## 2018-04-11 RX ADMIN — INSULIN LISPRO SCH: 100 INJECTION, SOLUTION INTRAVENOUS; SUBCUTANEOUS at 08:03

## 2018-04-11 RX ADMIN — INSULIN LISPRO SCH: 100 INJECTION, SOLUTION INTRAVENOUS; SUBCUTANEOUS at 17:15

## 2018-04-11 RX ADMIN — PANTOPRAZOLE SODIUM SCH MG: 40 TABLET, DELAYED RELEASE ORAL at 08:04

## 2018-04-11 RX ADMIN — INSULIN LISPRO SCH UNITS: 100 INJECTION, SOLUTION INTRAVENOUS; SUBCUTANEOUS at 21:14

## 2018-04-11 RX ADMIN — WHITE PETROLATUM SCH: 1 OINTMENT TOPICAL at 13:33

## 2018-04-11 RX ADMIN — POTASSIUM CHLORIDE SCH MEQ: 20 TABLET, EXTENDED RELEASE ORAL at 08:04

## 2018-04-11 RX ADMIN — INSULIN LISPRO SCH: 100 INJECTION, SOLUTION INTRAVENOUS; SUBCUTANEOUS at 13:26

## 2018-04-11 NOTE — PN
ENDOCRINOLOGY FOLLOWUP NOTE



DATE:



LOCATION:  In room 709



SUBJECTIVE:  This is a 67-year-old male with recent uncontrolled type 2

insulin-requiring diabetes, now being followed closely for metabolic

management.  His glycemic levels are still fluctuating, but improved and

the latest glucose levels have ranged from 159 to 247 and 273 mg/dL.  His

latest chemistry showed a BUN of 22, sodium 141, potassium 5.0, chloride

101, CO2 of 27, glucose 151, and creatinine 1.1.  So, at this time, we will

modify once again his basal and bolus insulin regimen and increase the

Levemir to 22 units subcutaneously at bedtime daily to start tonight.  We

will also modify his Humalog to be given at a higher dosing of 8 units

subcutaneously a.c. dinner as ordered.  We will continue also his dual oral

hypoglycemic drug therapy given as glipizide at 10 mg b.i.d. and Januvia

given as 100 mg once daily as ordered.  We will titrate incrementally as

indicated to optimize metabolic control.  We will obtain serial chemistries

and supplement accordingly as needed.  We will follow.





__________________________________________

Bernie Collins MD





DD:  04/11/2018 17:10:21

DT:  04/11/2018 17:11:31

Job # [id number]

## 2018-04-12 VITALS — HEART RATE: 90 BPM

## 2018-04-12 RX ADMIN — PANTOPRAZOLE SODIUM SCH MG: 40 TABLET, DELAYED RELEASE ORAL at 08:37

## 2018-04-12 RX ADMIN — POTASSIUM CHLORIDE SCH MEQ: 20 TABLET, EXTENDED RELEASE ORAL at 08:36

## 2018-04-12 RX ADMIN — INSULIN LISPRO SCH: 100 INJECTION, SOLUTION INTRAVENOUS; SUBCUTANEOUS at 07:47

## 2018-04-12 RX ADMIN — INSULIN LISPRO SCH UNITS: 100 INJECTION, SOLUTION INTRAVENOUS; SUBCUTANEOUS at 21:17

## 2018-04-12 RX ADMIN — WHITE PETROLATUM SCH: 1 OINTMENT TOPICAL at 08:37

## 2018-04-12 RX ADMIN — INSULIN LISPRO SCH: 100 INJECTION, SOLUTION INTRAVENOUS; SUBCUTANEOUS at 16:33

## 2018-04-12 RX ADMIN — INSULIN LISPRO SCH: 100 INJECTION, SOLUTION INTRAVENOUS; SUBCUTANEOUS at 12:24

## 2018-04-12 RX ADMIN — COLLAGENASE SANTYL SCH APPLIC: 250 OINTMENT TOPICAL at 08:37

## 2018-04-12 NOTE — CP.PCM.PN
Subjective





- Date & Time of Evaluation


Date of Evaluation: 04/12/18


Time of Evaluation: 10:26





- Subjective


Subjective: 


66 y/o male seen at bedside this morning for left dorsolateral foot abscess. Pt 

resting in bedside chair at time of visit in NAD. Denies any events overnight. 

Says he feels fine but has a little pain in the foot which comes and goes. 

States he has been doing a lot of physical therapy and feels stronger overall. 

Says his pain is well controlled with pain medications. Denies F/C/N/V/CP/SOB











Objective





- Vital Signs/Intake and Output


Vital Signs (last 24 hours): 


 











Temp Pulse Resp BP Pulse Ox


 


 98.1 F   80   20   100/63   99 


 


 04/12/18 08:19  04/12/18 08:33  04/12/18 08:19  04/12/18 08:33  04/12/18 08:19











- Medications


Medications: 


 Current Medications





Acetaminophen (Tylenol 325mg Tab)  650 mg PO Q4 PRN


   PRN Reason: Fever >100.4 F


   Last Admin: 04/11/18 17:13 Dose:  650 mg


Atorvastatin Calcium (Lipitor)  40 mg PO DAILY@2100 [facility]


   Last Admin: 04/11/18 21:15 Dose:  40 mg


Collagenase (Santyl)  1 applic TOP DAILY [facility]


   Last Admin: 04/12/18 08:37 Dose:  1 applic


Dextrose (Dextrose 50% Inj)  50 ml IVP ONCE PRN


   PRN Reason: Hypoglycemia


Dextrose (Glutose 15)  15 gm PO ONCE PRN


   PRN Reason: Hypoglycemia


Emollient Ointment (Vaseline Oint)  1 pkt TOP DAILY [facility]


   Last Admin: 04/12/18 08:37 Dose:  Not Given


Ferrous Sulfate (Feosol)  325 mg PO DAILY [facility]


   Last Admin: 04/12/18 08:35 Dose:  325 mg


Gabapentin (Neurontin)  400 mg PO HS [facility]


   Last Admin: 04/11/18 21:15 Dose:  400 mg


Glipizide (Glucotrol)  10 mg PO ACBD [facility]


   Last Admin: 04/12/18 07:45 Dose:  10 mg


Glucagon (Glucagen Diagnostic Kit)  1 mg IM STAT PRN


   PRN Reason: Hypoglycemia


Vancomycin HCl 1 gm/ Sodium (Chloride)  250 mls @ 166.667 mls/hr IVPB DAILY@

1700 [facility]


   PRN Reason: Protocol


   Last Admin: 04/11/18 17:14 Dose:  166.667 mls/hr


Cefazolin Sodium 1 gm/ Sodium (Chloride)  100 mls @ 100 mls/hr IVPB Q8@0500,1300

,2100 [facility]


   PRN Reason: Protocol


   Last Admin: 04/12/18 06:20 Dose:  100 mls/hr


Insulin Detemir (Levemir)  22 units SC HS [facility]


   Last Admin: 04/11/18 21:13 Dose:  22 u


Insulin Human Lispro (Humalog)  0 units SC ACHS [facility]


   Last Admin: 04/12/18 07:47 Dose:  Not Given


Insulin Human Lispro (Humalog)  8 units SC ACD [facility]


Losartan Potassium (Cozaar)  25 mg PO DAILY [facility]


   Last Admin: 04/12/18 08:33 Dose:  25 mg


Pantoprazole Sodium (Protonix Ec Tab)  40 mg PO DAILY [facility]


   Last Admin: 04/12/18 08:37 Dose:  40 mg


Potassium Chloride (K-Dur 20 Meq Er Tab)  20 meq PO DAILY [facility]


   Last Admin: 04/12/18 08:36 Dose:  20 meq


Sitagliptin Phosphate (Januvia)  100 mg PO DAILY@0800 [facility]


   Last Admin: 04/12/18 08:33 Dose:  100 mg


Temazepam (Restoril)  15 mg PO HS PRN


   PRN Reason: Insomnia


Ticagrelor (Brilinta)  90 mg PO BID [facility]


   Last Admin: 04/12/18 08:35 Dose:  90 mg


Tramadol HCl (Ultram)  50 mg PO Q6 PRN


   PRN Reason: Pain, moderate (4-7)


   Last Admin: 04/11/18 13:32 Dose:  50 mg











- Labs


Labs: 


 





 04/09/18 05:30 





 04/09/18 05:30 











- Constitutional


Appears: Well, Non-toxic, No Acute Distress





- Extremities Exam


Additional comments: 


VASC: LLE DP and PT pulses nonpalpable. RLE DP pulse weakly palpable 1/4, PT 

nonpalpable. CFT <3 seconds to all digits x10. Pedal hair growth present. Mild 

nonpitting edema noted to left foot resolving. Temperature gradient warm to 

warm B/L


NEURO: Protective and gross sensation diminished


DERM: Fibro-necrotic eschar located on the entire dorsolateral foot with 

periwound skin erosion and lifting of eschar edges noted. Periwound erythema 

noted. Mild fluctuance noted to plantar lateral aspect near 5th met base. 2-3 

additional cc of pus-tinged serous fluid elicited with pressure from proximal 

lateral aspect of abscess at site of fluctuance


ORTHO: no tenderness on palpation of left foot ulceration














- Neurological Exam


Neurological Exam: Alert, Awake, Oriented x3





- Psychiatric Exam


Psychiatric exam: Normal Affect, Normal Mood





Assessment and Plan





- Assessment and Plan (Free Text)


Assessment: 


67 year male with left foot ulceration with underlying abscess





Plan: 


Patient seen and evaluated at bedside


Discussed with attending, Dr. Ascencio


Left foot XR (4/5/18): Negative for OM, ST swelling dorsal and plantar aspects 

of left foot


Foot MRI 3/30 indicative of left foot abscess


Repeat MRI performed 4/6/18 shows patchy edema of cuboid, lateral cuneiform and 

bases 3rd-5th mets


Wound cx (+) for growth of Candida Parasilosis


Continue IV abx per ID - Vancomycin 1g IV


   -per ID, pt to receive 6 weeks IV abx due to likely OM of midfoot


Continue pain regimen per medicine


Continue PT/OT - patient may be full WB to the LLE in surgical shoe


Pt stable for discharge and advised to follow up in [facility] podiatry clinic on 

Monday 4/16 with Dr. Ascencio


Wound care orders: please clean dorsolateral left foot wound daily with saline 

and dress with Santyl, abdominal pads and dry dressing


Podiatry will continue to follow while in house

## 2018-04-12 NOTE — PN
ENDOCRINOLOGY FOLLOWUP NOTE



DATE:



LOCATION:  In room [address] [place] [address]



SUBJECTIVE:  This is a 67-year-old male with recent uncontrolled type 2

insulin-requiring diabetes, now being followed closely for metabolic

management.  His glycemic levels are fluctuating as noted because of the

variability of his oral intake, especially at dinnertime as noted.  His

glucose levels have ranged from 273 to 338 mg/dL overnight as noted. 

Today's glucose level was 90 in the morning.  His latest chemistry showed a

BUN of 22, sodium 141, potassium 5.0, chloride 101, CO2 of 27, glucose 151,

and creatinine 1.1.  So, at this time, we will modify once again his

prandial insulin with Humalog to be given at a higher dose of 8 units

subcutaneously a.c. dinner to start today.  We will also continue the

modified basal insulin with Levemir given as 24 units subcutaneously at

bedtime daily to start tonight.  We will continue his dual oral

hypoglycemic therapy with glipizide given as 10 mg b.i.d. and Januvia as

100 mg once daily in the morning as ordered.  We will obtain serial

chemistries and supplement accordingly as needed.  We will follow.





__________________________________________

Bernie Collins MD





DD:  04/12/2018 22:10:06

DT:  04/12/2018 22:13:11

Job # [id number]

## 2018-04-13 VITALS — OXYGEN SATURATION: 99 % | DIASTOLIC BLOOD PRESSURE: 71 MMHG | SYSTOLIC BLOOD PRESSURE: 130 MMHG | TEMPERATURE: 98.6 F

## 2018-04-13 RX ADMIN — WHITE PETROLATUM SCH: 1 OINTMENT TOPICAL at 09:19

## 2018-04-13 RX ADMIN — PANTOPRAZOLE SODIUM SCH MG: 40 TABLET, DELAYED RELEASE ORAL at 09:18

## 2018-04-13 RX ADMIN — COLLAGENASE SANTYL SCH APPLIC: 250 OINTMENT TOPICAL at 09:21

## 2018-04-13 RX ADMIN — INSULIN LISPRO SCH: 100 INJECTION, SOLUTION INTRAVENOUS; SUBCUTANEOUS at 12:26

## 2018-04-13 RX ADMIN — POTASSIUM CHLORIDE SCH MEQ: 20 TABLET, EXTENDED RELEASE ORAL at 09:18

## 2018-04-13 RX ADMIN — INSULIN LISPRO SCH: 100 INJECTION, SOLUTION INTRAVENOUS; SUBCUTANEOUS at 06:45

## 2018-04-13 NOTE — CP.PCM.DIS
Provider





- Provider


Date of Admission: 


04/04/18 19:01





Attending physician: 


Gunner Valdez MD





Time Spent in preparation of Discharge (in minutes): 30





Diagnosis





- Discharge Diagnosis


(1) Abscess


Status: Chronic   





(2) CHF (congestive heart failure)


Status: Acute   





(3) NSTEMI (non-ST elevated myocardial infarction)


Status: Resolved   





(4) DKA (diabetic ketoacidoses)


Status: Resolved   





Hospital Course





- Lab Results


Lab Results: 


 Most Recent Lab Values











WBC  13.3 K/uL (4.8-10.8)  H  04/09/18  05:30    


 


RBC  3.75 Mil/uL (4.40-5.90)  L  04/09/18  05:30    


 


Hgb  10.1 g/dL (12.0-18.0)  L  04/09/18  05:30    


 


Hct  31.0 % (35.0-51.0)  L  04/09/18  05:30    


 


MCV  82.7 fl (80.0-94.0)   04/09/18  05:30    


 


MCH  26.9 pg (27.0-31.0)  L  04/09/18  05:30    


 


MCHC  32.5 g/dL (33.0-37.0)  L  04/09/18  05:30    


 


RDW  17.4 % (11.5-14.5)  H  04/09/18  05:30    


 


Plt Count  476 K/uL (130-400)  H  04/09/18  05:30    


 


MPV  11.2 fl (7.2-11.7)   04/09/18  05:30    


 


Neut % (Auto)  53.7 % (50.0-75.0)   04/09/18  05:30    


 


Lymph % (Auto)  16.7 % (20.0-40.0)  L  04/09/18  05:30    


 


Mono % (Auto)  10.8 % (0.0-10.0)  H  04/09/18  05:30    


 


Eos % (Auto)  18.2 % (0.0-4.0)  H  04/09/18  05:30    


 


Baso % (Auto)  0.6 % (0.0-2.0)   04/09/18  05:30    


 


Neut # (Auto)  7.2 K/uL (1.8-7.0)  H  04/09/18  05:30    


 


Lymph # (Auto)  2.2 K/uL (1.0-4.3)   04/09/18  05:30    


 


Mono # (Auto)  1.4 K/uL (0.0-0.8)  H  04/09/18  05:30    


 


Eos # (Auto)  2.4 K/uL (0.0-0.7)  H  04/09/18  05:30    


 


Baso # (Auto)  0.1 K/uL (0.0-0.2)   04/09/18  05:30    


 


Sodium  141 mmol/l (132-148)   04/09/18  05:30    


 


Potassium  5.0 MMOL/L (3.6-5.0)   04/09/18  05:30    


 


Chloride  101 mmol/L ()   04/09/18  05:30    


 


Carbon Dioxide  27 mmol/L (22-30)   04/09/18  05:30    


 


Anion Gap  18  (10-20)   04/09/18  05:30    


 


BUN  22 mg/dl (9-20)  H  04/09/18  05:30    


 


Creatinine  1.1 mg/dl (0.8-1.5)   04/09/18  05:30    


 


Est GFR ( Amer)  > 60   04/09/18  05:30    


 


Est GFR (Non-Af Amer)  > 60   04/09/18  05:30    


 


POC Glucose (mg/dL)  232 mg/dL ()  H  04/13/18  11:05    


 


Random Glucose  151 mg/dL ()  H  04/09/18  05:30    


 


Calcium  9.8 mg/dL (8.4-10.2)   04/09/18  05:30    


 


Total Bilirubin  0.5 mg/dl (0.2-1.3)   04/09/18  05:30    


 


AST  45 U/L (17-59)   04/09/18  05:30    


 


ALT  31 U/L (21-72)   04/09/18  05:30    


 


Alkaline Phosphatase  104 U/L ()   04/09/18  05:30    


 


Total Protein  8.2 G/DL (6.3-8.2)   04/09/18  05:30    


 


Albumin  3.5 g/dL (3.5-5.0)   04/09/18  05:30    


 


Globulin  4.7 gm/dL (2.2-3.9)  H  04/09/18  05:30    


 


Albumin/Globulin Ratio  0.7  (1.0-2.1)  L  04/09/18  05:30    


 


Vancomycin Trough  7.5 ug/mL (5.0-10.0)   04/09/18  16:00    














- Hospital Course


Hospital Course: 





Pt came in with CHF, NSTEMI, chronic ulcer L foot with abscess, possible 

osteomyelitis, treated and stable, needs long term ABX





Discharge Exam





- Head Exam


Head Exam: ATRAUMATIC





- Eye Exam


Eye Exam: Normal appearance


Pupil Exam: NORMAL ACCOMODATION





- ENT Exam


ENT Exam: Mucous Membranes Dry





- Respiratory Exam


Respiratory Exam: Clear to PA & Lateral





- Cardiovascular Exam


Cardiovascular Exam: REGULAR RHYTHM





- GI/Abdominal Exam


GI & Abdominal Exam: Normal Bowel Sounds





- Neurological Exam


Neurological exam: Alert, CN II-XII Intact, Oriented x3





- Skin


Additional comments: 





L foot chronic ulcer wound





Discharge Plan





- Follow Up Plan


Condition: GOOD


Disposition: HOME/ ROUTINE


Patient education suggested?: Yes


Instructions:  Skin Abscess, Heart Failure, Adult (DC), Wound Care (DC), 

Preventing Falls


Additional Instructions: 


discharge patient home today, followup with ADÁN Valdez in one week. call for 

appointment, [phone].


followup with Dr. Ascencio at the podiatry clinic on Monday, April 16, 2018 at 

10:30am. please arrive 30 minutes before appointment.


Call Moisés Palacios at [phone] as soon as you arrive home for Home 

antibiotics.


Referred to [facility] [phone].

## 2018-04-13 NOTE — CP.PCM.PN
Subjective





- Date & Time of Evaluation


Date of Evaluation: 04/13/18


Time of Evaluation: 09:36





- Subjective


Subjective: 


68 y/o male seen at bedside this morning for left dorsolateral foot abscess. Pt 

resting in bed at time of visit in NAD. Denies any events overnight. Says he 

occasional has pain in the foot, more so when pressure is applied to the 

abscess area. States he has been doing a lot of physical therapy and is walking 

well. Says his pain is well controlled with pain medications. Denies F/C/N/V/CP/

SOB








Objective





- Vital Signs/Intake and Output


Vital Signs (last 24 hours): 


 











Temp Pulse Resp BP Pulse Ox


 


 98.6 F   90   20   130/71   99 


 


 04/13/18 08:56  04/13/18 09:17  04/13/18 08:56  04/13/18 09:17  04/13/18 08:56











- Medications


Medications: 


 Current Medications





Acetaminophen (Tylenol 325mg Tab)  650 mg PO Q4 PRN


   PRN Reason: Fever >100.4 F


   Last Admin: 04/11/18 17:13 Dose:  650 mg


Atorvastatin Calcium (Lipitor)  40 mg PO DAILY@2100 [facility]


   Last Admin: 04/12/18 21:11 Dose:  40 mg


Collagenase (Santyl)  1 applic TOP DAILY [facility]


   Last Admin: 04/13/18 09:21 Dose:  1 applic


Dextrose (Dextrose 50% Inj)  50 ml IVP ONCE PRN


   PRN Reason: Hypoglycemia


Dextrose (Glutose 15)  15 gm PO ONCE PRN


   PRN Reason: Hypoglycemia


Emollient Ointment (Vaseline Oint)  1 pkt TOP DAILY [facility]


   Last Admin: 04/13/18 09:19 Dose:  Not Given


Ferrous Sulfate (Feosol)  325 mg PO DAILY [facility]


   Last Admin: 04/13/18 09:17 Dose:  325 mg


Gabapentin (Neurontin)  400 mg PO HS [facility]


   Last Admin: 04/12/18 21:11 Dose:  400 mg


Glipizide (Glucotrol)  10 mg PO ACBD [facility]


   Last Admin: 04/13/18 09:17 Dose:  10 mg


Glucagon (Glucagen Diagnostic Kit)  1 mg IM STAT PRN


   PRN Reason: Hypoglycemia


Vancomycin HCl 1 gm/ Sodium (Chloride)  250 mls @ 166.667 mls/hr IVPB DAILY@

1700 [facility]


   PRN Reason: Protocol


   Last Admin: 04/12/18 16:31 Dose:  166.667 mls/hr


Cefazolin Sodium 1 gm/ Sodium (Chloride)  100 mls @ 100 mls/hr IVPB Q8@0500,1300

,2100 [facility]


   PRN Reason: Protocol


   Last Admin: 04/13/18 05:04 Dose:  100 mls/hr


Insulin Detemir (Levemir)  24 units SC HS [facility]


   Last Admin: 04/12/18 21:11 Dose:  24 units


Insulin Human Lispro (Humalog)  0 units SC ACHS [facility]


   Last Admin: 04/13/18 06:45 Dose:  Not Given


Insulin Human Lispro (Humalog)  8 units SC ACD [facility]


   Last Admin: 04/12/18 16:33 Dose:  8 units


Losartan Potassium (Cozaar)  25 mg PO DAILY [facility]


   Last Admin: 04/13/18 09:17 Dose:  25 mg


Pantoprazole Sodium (Protonix Ec Tab)  40 mg PO DAILY [facility]


   Last Admin: 04/13/18 09:18 Dose:  40 mg


Potassium Chloride (K-Dur 20 Meq Er Tab)  20 meq PO DAILY [facility]


   Last Admin: 04/13/18 09:18 Dose:  20 meq


Sitagliptin Phosphate (Januvia)  100 mg PO DAILY@0800 [facility]


   Last Admin: 04/13/18 09:17 Dose:  100 mg


Temazepam (Restoril)  15 mg PO HS PRN


   PRN Reason: Insomnia


Ticagrelor (Brilinta)  90 mg PO BID [facility]


   Last Admin: 04/13/18 09:17 Dose:  90 mg


Tramadol HCl (Ultram)  50 mg PO Q6 PRN


   PRN Reason: Pain, moderate (4-7)


   Last Admin: 04/11/18 13:32 Dose:  50 mg











- Labs


Labs: 


 





 04/09/18 05:30 





 04/09/18 05:30 











- Constitutional


Appears: Well, Non-toxic, No Acute Distress





- Extremities Exam


Additional comments: 


VASC: LLE DP and PT pulses nonpalpable. RLE DP pulse weakly palpable 1/4, PT 

nonpalpable. CFT <3 seconds to all digits x10. Pedal hair growth present. Mild 

nonpitting edema noted to left foot resolving. Temperature gradient warm to 

warm B/L


NEURO: Protective and gross sensation diminished


DERM: Fibro-necrotic eschar located on the entire dorsolateral foot with 

periwound skin erosion and lifting of eschar edges noted. Periwound erythema 

noted. Mild fluctuance noted to plantar lateral aspect near 5th met base. 1-2 

additional cc of thin purulent fluid elicited with pressure from proximal 

lateral aspect of abscess at site of fluctuance


ORTHO: mild tenderness on palpation of left foot ulceration











- Neurological Exam


Neurological Exam: Alert, Awake, Oriented x3





- Psychiatric Exam


Psychiatric exam: Normal Affect, Normal Mood





Assessment and Plan





- Assessment and Plan (Free Text)


Assessment: 


67 year male with left foot ulceration with underlying abscess





Plan: 


Patient seen and evaluated at bedside


Discussed with attending, Dr. Ascencio


Left foot XR (4/5/18): Negative for OM, ST swelling dorsal and plantar aspects 

of left foot


Foot MRI 3/30 indicative of left foot abscess


Repeat MRI performed 4/6/18 shows patchy edema of cuboid, lateral cuneiform and 

bases 3rd-5th mets


Wound cx (+) for growth of Candida Parasilosis


Continue IV abx per ID - Vancomycin 1g IV


   -per ID, pt to receive 6 weeks IV abx due to likely OM of midfoot


Continue pain regimen per medicine


Continue PT/OT - patient may be full WB to the LLE in surgical shoe


Pt stable for discharge and advised to follow up in [facility] podiatry clinic on 

Monday 4/16 with Dr. Ascencio


Pt will require surgical I&D of L foot abscess, likely to be scheduled at f/u 

appointment Monday - to be performed as outpatient same day procedure


Wound care orders: please clean dorsolateral left foot wound daily with saline 

and dress with Santyl, abdominal pads and dry dressing


Podiatry will continue to follow while in house

## 2018-04-13 NOTE — CP.PCM.PN
Subjective





- Date & Time of Evaluation


Date of Evaluation: 04/13/18


Time of Evaluation: 09:00





- Subjective


Subjective: 





wound stable


going home on iv antibiitics 





Objective





- Vital Signs/Intake and Output


Vital Signs (last 24 hours): 


 











Temp Pulse Resp BP Pulse Ox


 


 98.6 F   90   20   130/71   99 


 


 04/13/18 08:56  04/13/18 09:17  04/13/18 08:56  04/13/18 09:17  04/13/18 08:56











- Medications


Medications: 


 Current Medications





Acetaminophen (Tylenol 325mg Tab)  650 mg PO Q4 PRN


   PRN Reason: Fever >100.4 F


   Last Admin: 04/11/18 17:13 Dose:  650 mg


Atorvastatin Calcium (Lipitor)  40 mg PO DAILY@2100 [facility]


   Last Admin: 04/12/18 21:11 Dose:  40 mg


Collagenase (Santyl)  1 applic TOP DAILY [facility]


   Last Admin: 04/13/18 09:21 Dose:  1 applic


Dextrose (Dextrose 50% Inj)  50 ml IVP ONCE PRN


   PRN Reason: Hypoglycemia


Dextrose (Glutose 15)  15 gm PO ONCE PRN


   PRN Reason: Hypoglycemia


Emollient Ointment (Vaseline Oint)  1 pkt TOP DAILY [facility]


   Last Admin: 04/13/18 09:19 Dose:  Not Given


Ferrous Sulfate (Feosol)  325 mg PO DAILY [facility]


   Last Admin: 04/13/18 09:17 Dose:  325 mg


Gabapentin (Neurontin)  400 mg PO [facility]


   Last Admin: 04/12/18 21:11 Dose:  400 mg


Glipizide (Glucotrol)  10 mg PO ACBD [facility]


   Last Admin: 04/13/18 09:17 Dose:  10 mg


Glucagon (Glucagen Diagnostic Kit)  1 mg IM STAT PRN


   PRN Reason: Hypoglycemia


Vancomycin HCl 1 gm/ Sodium (Chloride)  250 mls @ 166.667 mls/hr IVPB DAILY@

1700 [facility]


   PRN Reason: Protocol


   Last Admin: 04/12/18 16:31 Dose:  166.667 mls/hr


Cefazolin Sodium 1 gm/ Sodium (Chloride)  100 mls @ 100 mls/hr IVPB Q8@0500,1300

,2100 [facility]


   PRN Reason: Protocol


   Last Admin: 04/13/18 05:04 Dose:  100 mls/hr


Insulin Detemir (Levemir)  24 units SC [facility]


   Last Admin: 04/12/18 21:11 Dose:  24 units


Insulin Human Lispro (Humalog)  0 units SC ACHS [facility]


   Last Admin: 04/13/18 06:45 Dose:  Not Given


Insulin Human Lispro (Humalog)  8 units SC ACD [facility]


   Last Admin: 04/12/18 16:33 Dose:  8 units


Losartan Potassium (Cozaar)  25 mg PO DAILY [facility]


   Last Admin: 04/13/18 09:17 Dose:  25 mg


Pantoprazole Sodium (Protonix Ec Tab)  40 mg PO DAILY [facility]


   Last Admin: 04/13/18 09:18 Dose:  40 mg


Potassium Chloride (K-Dur 20 Meq Er Tab)  20 meq PO DAILY [facility]


   Last Admin: 04/13/18 09:18 Dose:  20 meq


Sitagliptin Phosphate (Januvia)  100 mg PO DAILY@0800 [facility]


   Last Admin: 04/13/18 09:17 Dose:  100 mg


Temazepam (Restoril)  15 mg PO HS PRN


   PRN Reason: Insomnia


Ticagrelor (Brilinta)  90 mg PO BID [facility]


   Last Admin: 04/13/18 09:17 Dose:  90 mg


Tramadol HCl (Ultram)  50 mg PO Q6 PRN


   PRN Reason: Pain, moderate (4-7)


   Last Admin: 04/11/18 13:32 Dose:  50 mg











- Labs


Labs: 


 





 04/09/18 05:30 





 04/09/18 05:30 











- Constitutional


Appears: Non-toxic, Chronically Ill





- Head Exam


Head Exam: NORMOCEPHALIC





- Eye Exam


Eye Exam: PERRL





- ENT Exam


ENT Exam: Mucous Membranes Dry





- Neck Exam


Neck Exam: absent: Lymphadenopathy





- Respiratory Exam


Respiratory Exam: Decreased Breath Sounds





- Cardiovascular Exam


Cardiovascular Exam: REGULAR RHYTHM





- GI/Abdominal Exam


GI & Abdominal Exam: Distended, Soft





- Rectal Exam


Rectal Exam: Deferred





-  Exam


 Exam: NORMAL INSPECTION





Assessment and Plan


(1) Abscess


Status: Acute   





(2) CHF (congestive heart failure)


Status: Acute   





(3) Chronic ulcer of leg


Status: Chronic   





(4) Diabetes mellitus type 2 in nonobese


Status: Chronic   





(5) DKA (diabetic ketoacidoses)


Status: Resolved

## 2018-04-13 NOTE — CP.PCM.PN
Subjective





- Date & Time of Evaluation


Date of Evaluation: 04/13/18


Time of Evaluation: 13:39





- Subjective


Subjective: 





seen and examined at bedside, feels well, no specific complaints





Objective





- Vital Signs/Intake and Output


Vital Signs (last 24 hours): 


 











Temp Pulse Resp BP Pulse Ox


 


 98.6 F   90   20   130/71   99 


 


 04/13/18 08:56  04/13/18 09:17  04/13/18 08:56  04/13/18 09:17  04/13/18 08:56











- Medications


Medications: 


 Current Medications





Acetaminophen (Tylenol 325mg Tab)  650 mg PO Q4 PRN


   PRN Reason: Fever >100.4 F


   Last Admin: 04/11/18 17:13 Dose:  650 mg


Atorvastatin Calcium (Lipitor)  40 mg PO DAILY@2100 [facility]


   Last Admin: 04/12/18 21:11 Dose:  40 mg


Collagenase (Santyl)  1 applic TOP DAILY [facility]


   Last Admin: 04/13/18 09:21 Dose:  1 applic


Dextrose (Dextrose 50% Inj)  50 ml IVP ONCE PRN


   PRN Reason: Hypoglycemia


Dextrose (Glutose 15)  15 gm PO ONCE PRN


   PRN Reason: Hypoglycemia


Emollient Ointment (Vaseline Oint)  1 pkt TOP DAILY [facility]


   Last Admin: 04/13/18 09:19 Dose:  Not Given


Ferrous Sulfate (Feosol)  325 mg PO DAILY [facility]


   Last Admin: 04/13/18 09:17 Dose:  325 mg


Gabapentin (Neurontin)  400 mg PO [facility]


   Last Admin: 04/12/18 21:11 Dose:  400 mg


Glipizide (Glucotrol)  10 mg PO ACBD [facility]


   Last Admin: 04/13/18 09:17 Dose:  10 mg


Glucagon (Glucagen Diagnostic Kit)  1 mg IM STAT PRN


   PRN Reason: Hypoglycemia


Vancomycin HCl 1 gm/ Sodium (Chloride)  250 mls @ 166.667 mls/hr IVPB DAILY@

1700 [facility]


   PRN Reason: Protocol


   Last Admin: 04/12/18 16:31 Dose:  166.667 mls/hr


Cefazolin Sodium 1 gm/ Sodium (Chloride)  100 mls @ 100 mls/hr IVPB Q8@0500,1300

,2100 [facility]


   PRN Reason: Protocol


   Last Admin: 04/13/18 12:29 Dose:  100 mls/hr


Insulin Detemir (Levemir)  24 units SC [facility]


   Last Admin: 04/12/18 21:11 Dose:  24 units


Insulin Human Lispro (Humalog)  0 units SC ACHS [facility]


   Last Admin: 04/13/18 12:26 Dose:  Not Given


Insulin Human Lispro (Humalog)  8 units SC ACD [facility]


   Last Admin: 04/12/18 16:33 Dose:  8 units


Losartan Potassium (Cozaar)  25 mg PO DAILY [facility]


   Last Admin: 04/13/18 09:17 Dose:  25 mg


Pantoprazole Sodium (Protonix Ec Tab)  40 mg PO DAILY [facility]


   Last Admin: 04/13/18 09:18 Dose:  40 mg


Potassium Chloride (K-Dur 20 Meq Er Tab)  20 meq PO DAILY [facility]


   Last Admin: 04/13/18 09:18 Dose:  20 meq


Sitagliptin Phosphate (Januvia)  100 mg PO DAILY@0800 [facility]


   Last Admin: 04/13/18 09:17 Dose:  100 mg


Temazepam (Restoril)  15 mg PO HS PRN


   PRN Reason: Insomnia


Ticagrelor (Brilinta)  90 mg PO BID [facility]


   Last Admin: 04/13/18 09:17 Dose:  90 mg


Tramadol HCl (Ultram)  50 mg PO Q6 PRN


   PRN Reason: Pain, moderate (4-7)


   Last Admin: 04/11/18 13:32 Dose:  50 mg











- Labs


Labs: 


 





 04/09/18 05:30 





 04/09/18 05:30 











- Constitutional


Appears: Non-toxic, No Acute Distress





- Head Exam


Head Exam: ATRAUMATIC





- Eye Exam


Eye Exam: EOMI, Normal appearance, PERRL





- ENT Exam


ENT Exam: Normal Exam





- Neck Exam


Neck Exam: Full ROM





- Respiratory Exam


Respiratory Exam: Clear to Ausculation Bilateral, NORMAL BREATHING PATTERN





- Cardiovascular Exam


Cardiovascular Exam: REGULAR RHYTHM





- GI/Abdominal Exam


GI & Abdominal Exam: Normal Bowel Sounds





- Extremities Exam


Extremities Exam: Full ROM


Additional comments: 





L foot dressing dry and clean





- Neurological Exam


Neurological Exam: Alert, Awake, Oriented x3





- Skin


Skin Exam: Dry, Normal Color





Assessment and Plan


(1) Abscess


Status: Chronic   





- Assessment and Plan (Free Text)


Assessment: 





67 year male with left foot chronic ulcer with abscess


Plan: 


Left foot XR (4/5/18): Negative for OM, ST swelling dorsal and plantar aspects 

of left foot


Foot MRI 3/30 indicative of left foot abscess


Repeat MRI performed 4/6/18 shows patchy edema of cuboid, lateral cuneiform and 

bases 3rd-5th mets


Wound cx (+) for growth of Candida Parasilosis


Continue IV abx per ID - Vancomycin 1g IV for 6 weeks due to likely OM of 

midfoot


Continue pain management


Continue PT/OT - patient may be full WB to the LLE in surgical shoe


Pt stable for discharge and advised to follow up in [facility] podiatry clinic on 

Monday 4/16 with Dr. Ascencio


Wound care orders: please clean dorsolateral left foot wound daily with saline 

and dress with Santyl, abdominal pads and dry dressing


continue all other medical management, stable for d/c home


above d/w dr jackson

## 2018-04-14 NOTE — PN
DATE:  ENDOCRINOLOGY FOLLOWUP NOTE



LOCATION:  Room number 709.



SUBJECTIVE:  This is a 67-year-old male with recent uncontrolled type 2

insulin-requiring diabetes now being followed closely for metabolic

management.  His glycemic levels are fluctuating because of the variability

of his oral intake and today's glucose levels have ranged from the 147 to

232 mg per deciliter.  It was 321 at bedtime last night.



LABORATORY DATA:   The latest chemistry showed a BUN of 22, sodium of 141,

potassium of 5.0, chloride of 101, CO2 of 27, glucose of 151 and creatinine

of 1.1.



ASSESSMENT AND PLAN:  So at this time, we will continue the same basal and

bolus insulin regimen to allow for dose equilibration especially with the

concomitant variability of his oral intake as noted.  We will continue the

Humalog given as 8 units _____ subcutaneous a.c. dinner as ordered.  We

will continue the basal insulin given as Levemir at 24 units subcutaneous

at bedtime daily as given.  We will also continue the dual oral

hypoglycemic therapy with Januvia at 100 mg daily and glipizide at 10 mg

b.i.d. before meals as ordered.  We will obtain serial chemistries and

supplement accordingly needed.  We will follow.







__________________________________________

Bernie Collins MD







DD:  04/13/2018 21:48:19

DT:  04/13/2018 21:49:18

Job # [id number]

## 2018-04-20 ENCOUNTER — HOSPITAL ENCOUNTER (OUTPATIENT)
Dept: HOSPITAL 14 - H.OPSURG | Age: 68
Discharge: HOME | End: 2018-04-20
Attending: PODIATRIST
Payer: MEDICARE

## 2018-04-20 VITALS
SYSTOLIC BLOOD PRESSURE: 132 MMHG | OXYGEN SATURATION: 97 % | DIASTOLIC BLOOD PRESSURE: 73 MMHG | HEART RATE: 85 BPM | TEMPERATURE: 96.9 F

## 2018-04-20 VITALS — RESPIRATION RATE: 18 BRPM

## 2018-04-20 VITALS — BODY MASS INDEX: 25.6 KG/M2

## 2018-04-20 DIAGNOSIS — M86.8X7: ICD-10-CM

## 2018-04-20 DIAGNOSIS — L02.612: Primary | ICD-10-CM

## 2018-04-20 PROCEDURE — 10061 I&D ABSCESS COMP/MULTIPLE: CPT

## 2018-04-20 PROCEDURE — 82948 REAGENT STRIP/BLOOD GLUCOSE: CPT

## 2018-04-20 PROCEDURE — 97161 PT EVAL LOW COMPLEX 20 MIN: CPT

## 2018-04-20 PROCEDURE — 88304 TISSUE EXAM BY PATHOLOGIST: CPT

## 2018-04-20 PROCEDURE — 87070 CULTURE OTHR SPECIMN AEROBIC: CPT

## 2018-04-20 PROCEDURE — 11044 DBRDMT BONE 1ST 20 SQ CM/<: CPT

## 2018-04-20 NOTE — CP.PCM.PN
Subjective





- Date & Time of Evaluation


Date of Evaluation: 04/20/18


Time of Evaluation: 09:08





- Subjective


Subjective: 





67 year old male seen and evaluated in [facility] preoperatively for I and D of left 

foot abscess with removal of necrotic tissue wibeth Ascencio this afternoon. 

Patient is AAO x 3 and NAD at time of visit. States that his pain is 3/10 at 

this time. Per [facility] nursing, pts blood sugar is 312. Spoke with Dr. Weathers and he 

states that procedure is still ok to go. Pt states that he has been NPO since 

before midnight last night. He also states that he has been taking his Aspirin 

regularly as instructed by his primary because of a stent that he has. Patient 

denies any further pedal complaints at this time. Denies any recent N/V/F/C/CP/

SOB/D/posterior calf pain when squeezed. 





Objective





- Vital Signs/Intake and Output


Vital Signs (last 24 hours): 


 











Temp Pulse Resp BP Pulse Ox


 


 97.6 F   97 H  20   122/64   99 


 


 04/20/18 08:37  04/20/18 08:42  04/20/18 08:37  04/20/18 08:37  04/20/18 08:37











- Constitutional


Appears: Well, Non-toxic, No Acute Distress





- Extremities Exam


Additional comments: 





VASC: LLE DP and PT pulses nonpalpable. RLE DP pulse weakly palpable 1/4, PT 

nonpalpable. CFT <3 seconds to all digits x10. Pedal hair growth present. Mild 

nonpitting edema noted to left foot resolving. Temperature gradient warm to 

warm B/L


NEURO: Protective and gross sensation diminished


DERM: Fibro-necrotic eschar located on the entire dorsolateral foot with 

periwound skin erosion and lifting of eschar edges noted. No periwound erythema 

noted. Minimal fluctuance noted to plantar lateral aspect near 5th met base. No 

purulence illicited


ORTHO: mild tenderness on palpation of left foot ulceration





- Neurological Exam


Neurological Exam: Alert, Awake, Oriented x3





- Psychiatric Exam


Psychiatric exam: Normal Affect, Normal Mood





Assessment and Plan





- Assessment and Plan (Free Text)


Assessment: 





67 year old male seen and evaluated in [facility] preoperatively for I and D of left 

foot abscess with removal of necrotic tissue wibeth Ascencio this afternoon


Plan: 





Pt was seen and examined in [facility]


Pt NPO status was confirmed


All pre-op testing and clearance in chart


Pt has exhausted all conservative treatment at this time and is opting for 

surgical intervention


Pt was explained procedure and post-operative course


All pt's questions were answered to satisfaction


No guarantees were made


Pt understands all risks, benefits and complications of procedure


Pt will follow-up with Dr. Ascencio within 1 week of surgery

## 2018-04-20 NOTE — OP
PROCEDURE DATE:



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

lateral cuneiform, and bases of third, fourth and fifth metatarsals.



NAME OF PROCEDURE:

1.  Complex wound debridement extending to the level of extensor tendon.

2.  Incision and drilling of bone cortex.



SURGEON:  Levi Ascencio DPM



ASSISTANTS:  Yamileth Kaminski DPM, PGY-1 and Cornelius Graham DPM, PGY-1.



TYPE OF ANESTHESIA:  IV sedation plus local.



ANESTHESIOLOGIST:  Roberto Weathers MD



INDICATIONS:  The patient is a 67-year-old male with the above diagnosis. 

The patient has exhausted all conservative treatment at this time and now

requires surgical intervention.  The patient signed the consent after

careful explanation of the risks, benefits, complications and alternatives

for surgical procedure.  No guarantees were given nor implied.  N.p.o.

status was confirmed prior to taking the patient to the operating room.



PREPARATION:  The patient was brought into the operating room and placed on

the operating room table in a supine position.  Time-out was performed for

identification of the correct patient and procedure.  After induction of IV

sedation and injection of 17 mL of 0.5% Marcaine plain was injected in an

ankle block fashion to the left lower extremity.  The left lower extremity

was then prepped and draped in a normal sterile manner and the procedure

began.  No tourniquet was utilized during the procedure.



PROCEDURE 1:  Attention was directed to the dorsolateral aspect of the left

foot where a 9 cm wide x 10 cm long x 0.4 cm in depth fibronecrotic eschar

was present.  At this time, a #10 blade was utilized to excised the eschar

from the dorsolateral foot.  Upon excision of the eschar, the extensor

tendons of the third through fifth digits were visible dorsally as well as

the extensor digitorum brevis muscle belly laterally.  At this time, the

Versajet ultrasonic debridement was utilized on setting 6 to excisionally

debride all nonviable fibrotic and necrotic tissue from the wound bed. 

Care was taken to avoid all _____ structures and minimize damage.  At this

time, a deep wound culture was taken from the plantar lateral aspect of the

wound bed at the site of the exposed extensor digitorum brevis muscle

belly.  A pulse lavage was then utilized to copiously irrigate the wound

bed with bacitracin-infused saline.



PROCEDURE 2:  At this time, _____ K-wire was loaded into the wire 

and utilize to perform sharp microdrilling of the bony cortices of the

cuboid, lateral cuneiform, and the bases of the third through fifth

metatarsals to allow for incision and drainage of bone marrow edema given

the presence of osteomyelitis.  At this time, 10 mL of topical thrombin

solution was then utilized to help cauterize all actively bleeding

superficial vessels.  Once again, the site was copiously irrigated with

sterile saline and the wound bed was dressed with white petrolatum gauze,

abdominal pads, 4 x 4 gauze, a Kerlix, and an Ace bandage.



POSTOPERATIVE CONDITION:  The patient tolerated the anesthesia and

procedure well and was escorted to the recovery room with vital signs

stable and neurovascular status intact to the left lower extremity.  The

patient will follow up with Dr. Ascencio within in one week of surgery.





__________________________________________

Yamileth Kaminski DPM





__________________________________________

Levi Ascencio DPM





DD:  04/20/2018 16:20:22

DT:  04/20/2018 17:03:45

Job # [id number]

## 2018-04-20 NOTE — CP.SDSHP
Same Day Surgery [facility]





- History


Proposed Procedure: Incision and drainage of left foot abscess with 

osteochondral drilling of bone


Pre-Op Diagnosis: Abscess of left foot with probable underlying osteomyelitis 

of midfoot





- Previous Medical/Surgical History


Pain: 3.





- Allergies


Allergies: 


Allergies





No Known Allergies Allergy (Verified 04/20/18 08:53)


 











- Physical Exam


Vital Signs: 


 Vital Signs











  04/20/18 04/20/18





  08:37 08:42


 


Temperature 97.6 F 


 


Pulse Rate 97 H 97 H


 


Respiratory 20 





Rate  


 


Blood Pressure 122/64 


 


O2 Sat by Pulse 99 





Oximetry  











Mental Status: Alert & Oriented x3





- {Optional Preform as Required}


Integument: Other (Necrotic eschar left foot)


Ortho: Other (Probable OM of left midfoot)





- Impression


Pt. Evaluated Today:Candidate for Anesthesia & Procedure: Yes





- Date & Time


Date: 04/20/18


Time: 09:15





Short Stay Discharge





- Short Stay Discharge


Admitting Diagnosis/Reason for Visit: L02.612


Disposition: HOME/ ROUTINE


Referrals: 


Gunner Valdez MD [Primary Care Provider] -

## 2018-04-20 NOTE — PCM.SURG1
Surgeon's Initial Post Op Note





- Surgeon's Notes


Surgeon: Dr. Levi Ascencio


Assistant: Yamileth Kaminski PGY 1, Vidya Graham PGY1


Type of Anesthesia: IV Sedation


Anesthesia Administered By: Dr. Weathers


Pre-Operative Diagnosis: Left foot abscess with midfoot osteomyelitis


Operative Findings: See dictation report.  M- Versajet setting 6, topical 

thrombin (10cc), Petrilotum gauze


Post-Operative Diagnosis: Same


Operation Performed: Incision and drainage of left foot abscess and 

microdrilling of bone cortex of cuboid and bases of 3rd metatarsal


Specimen/Specimens Removed: None


Estimated Blood Loss: EBL {In ML}: 30


Blood Products Given: N/A


Drains Used: No Drains


Post-Op Condition: Good


Date of Surgery/Procedure: 04/20/18


Time of Surgery/Procedure: 11:37

## 2018-05-22 ENCOUNTER — HOSPITAL ENCOUNTER (OUTPATIENT)
Dept: HOSPITAL 14 - H.OPSURG | Age: 68
Discharge: HOME | End: 2018-05-22
Attending: PODIATRIST
Payer: MEDICARE

## 2018-05-22 VITALS — RESPIRATION RATE: 18 BRPM

## 2018-05-22 VITALS — DIASTOLIC BLOOD PRESSURE: 76 MMHG | OXYGEN SATURATION: 98 % | TEMPERATURE: 97.8 F | SYSTOLIC BLOOD PRESSURE: 121 MMHG

## 2018-05-22 VITALS — BODY MASS INDEX: 24.3 KG/M2

## 2018-05-22 VITALS — HEART RATE: 78 BPM

## 2018-05-22 DIAGNOSIS — T85.618A: Primary | ICD-10-CM

## 2018-05-22 NOTE — PCM.SURG1
Surgeon's Initial Post Op Note





- Surgeon's Notes


Surgeon: Moy Bello MD


Assistant: None


Pre-Operative Diagnosis: malfunctioning PICC


Operative Findings: indwelling RUE PICC.  occlusion at PICC tip.  PICC 

exchange.  catheter length: 37 cm.  catheter tip: cavoatrial junction


Post-Operative Diagnosis: same


Operation Performed: RUE PICC Exchange


Specimen/Specimens Removed: n/a


Estimated Blood Loss: EBL {In ML}: 0


Date of Surgery/Procedure: 05/22/18


Time of Surgery/Procedure: 11:45

## 2018-05-22 NOTE — CP.SDSHP
Same Day Surgery [facility] & P





- History


Proposed Procedure: picc exchange


Pre-Op Diagnosis: malfunctioning picc





- Allergies


Allergies: 


Allergies





No Known Allergies Allergy (Verified 04/20/18 08:53)


 











- Physical Exam


Vital Signs: 


 Vital Signs











  05/22/18 05/22/18 05/22/18





  09:01 09:04 11:11


 


Temperature 98.1 F  97.4 F L


 


Pulse Rate 86 86 78


 


Respiratory 18  18





Rate   


 


Blood Pressure 127/78  119/70


 


O2 Sat by Pulse 98  99





Oximetry   














- Impression


Impression: 67 yo male w/ malfunctioning PICC; plan exchange





- Date & Time


Date: 05/22/18


Time: 11:45





Short Stay Discharge





- Short Stay Discharge


Admitting Diagnosis/Reason for Visit: OBSTRUCTION OF PICC FLOW


Disposition: HOME/ ROUTINE


Referrals: 


Gunner Valdez MD [Primary Care Provider] -

## 2018-05-23 NOTE — VASCULAR
PROCEDURE:  PERIPHERALLY INSERTED CENTRAL VENOUS CATHETER EXCHANGE 



CLINICAL HISTORY:  68-year-old male with malfunctioning right upper 

extremity PICC is referred to Interventional Radiology for PICC 

evaluation and possible exchange.



COMPARISON:

PICC insertion performed 03/28/2018



PROCEDURE:  1. Exchange of peripherally inserted central venous 

catheter.



2. Fluoroscopic localization of catheter tip.



PRE-PROCEDURE FINDINGS:  1. Indwelling right upper extremity PICC.



2. Occlusion at catheter tip. 



POST-PROCEDURE FINDINGS:  1. Exchange of 4 Togolese single-lumen PICC.



2. Catheter length: 37 cm.



3. Catheter tip at cavoatrial junction.



INTERVENTIONAL RADIOLOGIST:  Moy Bello M.D. (the attending was 

present for the entire procedure)



ANESTHESIA:  None.



MEDICATION:  None.



COMPLICATIONS:  None.



RADIATION DOSE:  Fluoroscopy Time: 121.0 seconds



Cumulative Dose: 14.39 mGy



PROCEDURE DESCRIPTION AND FINDINGS:  The risks, benefits, 

alternatives and possible complications of the procedure were fully 

discussed; all questions were answered and informed consent was 

obtained. The patient was brought into the interventional suite and a 

pre-procedure 'time-out' was performed. 



The patient was placed on the fluoroscopy table in the supine 

position. The right upper extremity was prepped and draped in the 

usual sterile fashion. Maximum sterile barrier precautions were 

maintained throughout the entire procedure. Preliminary fluoroscopic 

 image of the chest demonstrated an indwelling right upper 

extremity PICC with catheter tip at the cavoatrial junction. A 0.018 

guidewire was inserted via the indwelling PICC.  Resistance was met 

at the catheter tip consistent with occlusion.  The guidewires 

manipulated beyond the catheter tip and the PICC was removed over the 

guidewire. After obtaining length measurement, a new 4 Togolese 

single-lumen PICC was placed with the tip of the catheter at the 

cavoatrial junction. The total length of the catheter is 37 cm. The 

hub of the PICC was secured to the skin using a sterile adhesive 

bandage.



The patient tolerated the procedure well without immediate 

post-procedure complications and was transferred back to the floor in 

stable condition.



IMPRESSION:

SUCCESSFUL OVER-THE-WIRE EXCHANGE OF RIGHT UPPER EXTREMITY PICC.



PICC OK TO USE.